# Patient Record
Sex: MALE | Race: BLACK OR AFRICAN AMERICAN | NOT HISPANIC OR LATINO | Employment: OTHER | URBAN - METROPOLITAN AREA
[De-identification: names, ages, dates, MRNs, and addresses within clinical notes are randomized per-mention and may not be internally consistent; named-entity substitution may affect disease eponyms.]

---

## 2021-12-09 ENCOUNTER — OFFICE VISIT (OUTPATIENT)
Dept: FAMILY MEDICINE CLINIC | Facility: HOSPITAL | Age: 81
End: 2021-12-09
Payer: COMMERCIAL

## 2021-12-09 VITALS
BODY MASS INDEX: 23.52 KG/M2 | SYSTOLIC BLOOD PRESSURE: 148 MMHG | HEART RATE: 67 BPM | DIASTOLIC BLOOD PRESSURE: 58 MMHG | HEIGHT: 71 IN | WEIGHT: 168 LBS | OXYGEN SATURATION: 97 %

## 2021-12-09 DIAGNOSIS — R09.81 NASAL SINUS CONGESTION: ICD-10-CM

## 2021-12-09 DIAGNOSIS — K21.9 GASTROESOPHAGEAL REFLUX DISEASE, UNSPECIFIED WHETHER ESOPHAGITIS PRESENT: Primary | ICD-10-CM

## 2021-12-09 DIAGNOSIS — R03.0 ELEVATED BP WITHOUT DIAGNOSIS OF HYPERTENSION: ICD-10-CM

## 2021-12-09 DIAGNOSIS — R13.10 SWALLOWING DISORDER: ICD-10-CM

## 2021-12-09 DIAGNOSIS — E11.9 TYPE 2 DIABETES MELLITUS WITHOUT COMPLICATION, WITHOUT LONG-TERM CURRENT USE OF INSULIN (HCC): ICD-10-CM

## 2021-12-09 PROBLEM — E78.2 MIXED HYPERLIPIDEMIA: Status: ACTIVE | Noted: 2021-12-09

## 2021-12-09 PROCEDURE — 1101F PT FALLS ASSESS-DOCD LE1/YR: CPT | Performed by: STUDENT IN AN ORGANIZED HEALTH CARE EDUCATION/TRAINING PROGRAM

## 2021-12-09 PROCEDURE — 99204 OFFICE O/P NEW MOD 45 MIN: CPT | Performed by: STUDENT IN AN ORGANIZED HEALTH CARE EDUCATION/TRAINING PROGRAM

## 2021-12-09 RX ORDER — GLIMEPIRIDE 4 MG/1
4 TABLET ORAL
COMMUNITY
End: 2022-01-24 | Stop reason: SDUPTHER

## 2021-12-09 RX ORDER — SIMVASTATIN 20 MG
20 TABLET ORAL
COMMUNITY
End: 2022-06-15 | Stop reason: SDUPTHER

## 2021-12-09 RX ORDER — FLUTICASONE PROPIONATE 50 MCG
1 SPRAY, SUSPENSION (ML) NASAL DAILY
Qty: 16 G | Refills: 2 | Status: SHIPPED | OUTPATIENT
Start: 2021-12-09

## 2021-12-09 RX ORDER — OMEPRAZOLE 20 MG/1
20 CAPSULE, DELAYED RELEASE ORAL
Qty: 30 CAPSULE | Refills: 3 | Status: SHIPPED | OUTPATIENT
Start: 2021-12-09 | End: 2022-06-14 | Stop reason: ALTCHOICE

## 2021-12-09 RX ORDER — LORAZEPAM 0.5 MG/1
TABLET ORAL
COMMUNITY
Start: 2021-11-01 | End: 2021-12-09

## 2021-12-09 NOTE — PROGRESS NOTES
[address] [facility],     Assessment/Plan:      Diagnosis ICD-10-CM Associated Orders   1  Gastroesophageal reflux disease, unspecified whether esophagitis present  K21 9 Ambulatory referral to Gastroenterology     omeprazole (PriLOSEC) 20 mg delayed release capsule   2  Swallowing disorder  R13 10 Ambulatory referral to Gastroenterology     omeprazole (PriLOSEC) 20 mg delayed release capsule   3  Type 2 diabetes mellitus without complication, without long-term current use of insulin (HCC)  E11 9    4  Nasal sinus congestion  R09 81 fluticasone (FLONASE) 50 mcg/act nasal spray   5  Elevated BP without diagnosis of hypertension  R03 0    6  BMI 23 0-23 9, adult  B35 22       Referral for GI given   Plan to pick next appointment and blood work timing after receiving records from prior PCP   Get flu and COVID records from prior  F/U sooner as needed   Patient may call or return to office with any questions or concerns  ______________________________________________________________________  Subjective:     Patient ID: Autumn Vigil is a [de-identified] y o  male  HPI  Autumn Vigil   Was seeing Dr Smooth Richard in [facility], but living locally and too far to continue driving to appts  Son uses our office  Recent  in October,  48 yrs  Has a history of diabetes and high cholesterol for which he takes glimepiride, metformin, and Zocor  Decreased appetite patric in mornings, lost weight was 175 lbs before her death  Feels very gassy in am and as if food gets stuck in throat in am   Also reports he feels left nasal congestion and left ear discomfort at times  Has tried saline rinses, and Flonase with only mild relief in the past     No drug, tobacco, alcohol use  Occasional caffeine use  Retired   Lives at home with his son Nii Beverly  [facility] - goes to [address] [place] [address]  Family history - siblings have diabetes  Seeing Eye doctor in [place] for diabetes checks     Foot exam through PCP in the past      BMI Counseling: Body mass index is 23 76 kg/m²  The BMI is normal     Depression Screening and Follow-up Plan:   Patient was screened for depression during today's encounter  They screened negative with a PHQ-2 score of 2  The following portions of the patient's history were reviewed and updated as appropriate: allergies, current medications, past family history, past medical history, past social history, past surgical history and problem list     Review of Systems   Constitutional: Negative for chills and fever  Respiratory: Negative for cough and shortness of breath  Cardiovascular: Negative for chest pain and palpitations  Neurological: Negative for light-headedness and headaches  Objective:      Vitals:    12/09/21 0920   BP: 148/58   Pulse: 67   SpO2: 97%   Repeat blood pressure:  150/60  Physical Exam  Vitals reviewed  Constitutional:       General: He is not in acute distress  Appearance: Normal appearance  He is well-developed and normal weight  He is not ill-appearing  HENT:      Head: Normocephalic and atraumatic  Nose: Congestion present  Eyes:      General: No scleral icterus  Right eye: No discharge  Left eye: No discharge  Cardiovascular:      Rate and Rhythm: Normal rate and regular rhythm  Pulses: Normal pulses  Heart sounds: Normal heart sounds  No murmur heard  No friction rub  No gallop  Pulmonary:      Effort: Pulmonary effort is normal  No respiratory distress  Breath sounds: Normal breath sounds  No stridor  No wheezing  Musculoskeletal:      Cervical back: Normal range of motion  Skin:     General: Skin is warm and dry  Neurological:      Mental Status: He is alert and oriented to person, place, and time  Psychiatric:         Mood and Affect: Mood normal          Behavior: Behavior normal          Thought Content:  Thought content normal          Judgment: Judgment normal  Portions of the record may have been created with voice recognition software  Occasional wrong word or "sound alike" substitutions may have occurred due to the inherent limitations of voice recognition software  Please review the chart carefully and recognize, using context, where substitutions/typographical errors may have occurred

## 2022-01-24 DIAGNOSIS — E11.9 TYPE 2 DIABETES MELLITUS WITHOUT COMPLICATION, WITHOUT LONG-TERM CURRENT USE OF INSULIN (HCC): Primary | ICD-10-CM

## 2022-01-24 RX ORDER — GLIMEPIRIDE 4 MG/1
4 TABLET ORAL
Qty: 90 TABLET | Refills: 3 | Status: SHIPPED | OUTPATIENT
Start: 2022-01-24

## 2022-04-11 DIAGNOSIS — E11.9 TYPE 2 DIABETES MELLITUS WITHOUT COMPLICATION, WITHOUT LONG-TERM CURRENT USE OF INSULIN (HCC): Primary | ICD-10-CM

## 2022-06-14 ENCOUNTER — OFFICE VISIT (OUTPATIENT)
Dept: GASTROENTEROLOGY | Facility: CLINIC | Age: 82
End: 2022-06-14
Payer: COMMERCIAL

## 2022-06-14 VITALS
HEIGHT: 71 IN | SYSTOLIC BLOOD PRESSURE: 138 MMHG | WEIGHT: 177 LBS | DIASTOLIC BLOOD PRESSURE: 82 MMHG | BODY MASS INDEX: 24.78 KG/M2 | HEART RATE: 50 BPM

## 2022-06-14 DIAGNOSIS — Z12.11 SCREENING FOR COLON CANCER: ICD-10-CM

## 2022-06-14 DIAGNOSIS — K21.9 GASTROESOPHAGEAL REFLUX DISEASE, UNSPECIFIED WHETHER ESOPHAGITIS PRESENT: Primary | ICD-10-CM

## 2022-06-14 PROCEDURE — 1160F RVW MEDS BY RX/DR IN RCRD: CPT | Performed by: NURSE PRACTITIONER

## 2022-06-14 PROCEDURE — 99213 OFFICE O/P EST LOW 20 MIN: CPT | Performed by: NURSE PRACTITIONER

## 2022-06-14 PROCEDURE — 1036F TOBACCO NON-USER: CPT | Performed by: NURSE PRACTITIONER

## 2022-06-14 NOTE — PROGRESS NOTES
[address] [facility] - Outpatient Follow-up Note  Calderon Rm [de-identified] y o  male MRN: [id number]  Encounter: [id number]    ASSESSMENT AND PLAN:      1  Gastroesophageal reflux disease, unspecified whether esophagitis present  Patient presents for annual checkup and medication refill  Patient states he has weaned himself off of the omeprazole 20 mg daily  Patient states with better dietary discretion he has successfully avoided acid reflux symptoms or breakthrough symptoms  Patient states he had not had an EGD in the past and was originally placed on medication by his PCP  Patient only requires follow-up as needed  2  Screening for colon cancer  Patient states he has never had a colonoscopy  No requirement due to age  Discussed with patient as needed follow-up for the future  Followup Appointment:  As needed  ______________________________________________________________________    Chief Complaint   Patient presents with    Follow-up     Annual followup as well as medication review     HPI:  28-year-old male with past medical history of diabetes and hyperlipidemia presents for annual med check  Patient states that he no longer takes omeprazole 20 mg daily  Patient states he had weaned himself off of medications and currently having no acid reflux symptoms or breakthrough symptoms  He denies nausea, vomiting or abdominal pain  States he has daily normal bowel movements  Denies hematochezia melena  States his weight is stable  Nonsmoker, denies ETOH  Historical Information   History reviewed  No pertinent past medical history  History reviewed  No pertinent surgical history    Social History     Substance and Sexual Activity   Alcohol Use Not Currently     Social History     Substance and Sexual Activity   Drug Use Never     Social History     Tobacco Use   Smoking Status Never Smoker   Smokeless Tobacco Never Used     Family History   Problem Relation Age of Onset    Diabetes Sister    [facility] Diabetes Brother     Colon cancer Neg Hx     Colon polyps Neg Hx          Current Outpatient Medications:     glimepiride (AMARYL) 4 mg tablet    metFORMIN (GLUCOPHAGE) 500 mg tablet    simvastatin (ZOCOR) 20 mg tablet    fluticasone (FLONASE) 50 mcg/act nasal spray  No Known Allergies  Reviewed medications and allergies and updated as indicated    PHYSICAL EXAM:    Blood pressure 138/82, pulse (!) 50, height 5' 10 5" (1 791 m), weight 80 3 kg (177 lb)  Body mass index is 25 04 kg/m²  Normal exam    General Appearance: NAD, cooperative, alert  Eyes: Anicteric, PERRLA, EOMI  ENT:  Normocephalic, atraumatic, normal mucosa  Neck:  Supple, symmetrical, trachea midline  Resp:  Clear to auscultation bilaterally; no rales, rhonchi or wheezing; respirations unlabored   CV:  S1 S2, Regular rate and rhythm; no murmur, rub, or gallop  GI:  Soft, non-tender, non-distended; normal bowel sounds; no masses, no organomegaly   Rectal: Deferred  Musculoskeletal: No cyanosis, clubbing or edema  Normal ROM  Skin:  No jaundice, rashes, or lesions   Heme/Lymph: No palpable cervical lymphadenopathy  Psych: Normal affect, good eye contact  Neuro: No gross deficits, AAOx3    Lab Results:   No results found for: WBC, HGB, HCT, MCV, PLT  No results found for: NA, K, CL, CO2, ANIONGAP, BUN, CREATININE, GLUCOSE, GLUF, CALCIUM, CORRECTEDCA, AST, ALT, ALKPHOS, PROT, BILITOT, EGFR  No results found for: IRON, TIBC, FERRITIN  No results found for: LIPASE    Radiology Results:   No results found

## 2022-06-15 ENCOUNTER — OFFICE VISIT (OUTPATIENT)
Dept: FAMILY MEDICINE CLINIC | Facility: HOSPITAL | Age: 82
End: 2022-06-15
Payer: COMMERCIAL

## 2022-06-15 VITALS
SYSTOLIC BLOOD PRESSURE: 148 MMHG | WEIGHT: 178 LBS | HEIGHT: 71 IN | OXYGEN SATURATION: 97 % | DIASTOLIC BLOOD PRESSURE: 68 MMHG | HEART RATE: 48 BPM | BODY MASS INDEX: 24.92 KG/M2 | RESPIRATION RATE: 16 BRPM

## 2022-06-15 DIAGNOSIS — E78.2 MIXED HYPERLIPIDEMIA: ICD-10-CM

## 2022-06-15 DIAGNOSIS — Z13.0 SCREENING FOR IRON DEFICIENCY ANEMIA: ICD-10-CM

## 2022-06-15 DIAGNOSIS — E11.9 TYPE 2 DIABETES MELLITUS WITHOUT COMPLICATION, WITHOUT LONG-TERM CURRENT USE OF INSULIN (HCC): Primary | ICD-10-CM

## 2022-06-15 DIAGNOSIS — R09.81 NASAL SINUS CONGESTION: ICD-10-CM

## 2022-06-15 DIAGNOSIS — Z13.29 SCREENING FOR THYROID DISORDER: ICD-10-CM

## 2022-06-15 DIAGNOSIS — R03.0 ELEVATED BP WITHOUT DIAGNOSIS OF HYPERTENSION: ICD-10-CM

## 2022-06-15 LAB — SL AMB POCT HEMOGLOBIN AIC: 6.8 (ref ?–6.5)

## 2022-06-15 PROCEDURE — 83036 HEMOGLOBIN GLYCOSYLATED A1C: CPT | Performed by: STUDENT IN AN ORGANIZED HEALTH CARE EDUCATION/TRAINING PROGRAM

## 2022-06-15 PROCEDURE — 99214 OFFICE O/P EST MOD 30 MIN: CPT | Performed by: STUDENT IN AN ORGANIZED HEALTH CARE EDUCATION/TRAINING PROGRAM

## 2022-06-15 RX ORDER — AMLODIPINE BESYLATE 2.5 MG/1
2.5 TABLET ORAL DAILY
Qty: 14 TABLET | Refills: 0 | Status: SHIPPED | OUTPATIENT
Start: 2022-06-15 | End: 2022-06-21 | Stop reason: SDUPTHER

## 2022-06-15 RX ORDER — LORATADINE 10 MG/1
10 TABLET ORAL DAILY
Qty: 30 TABLET | Refills: 2 | Status: SHIPPED | OUTPATIENT
Start: 2022-06-15 | End: 2022-10-12 | Stop reason: SDUPTHER

## 2022-06-15 RX ORDER — SIMVASTATIN 20 MG
20 TABLET ORAL
Qty: 90 TABLET | Refills: 3 | Status: SHIPPED | OUTPATIENT
Start: 2022-06-15 | End: 2022-10-12 | Stop reason: SDUPTHER

## 2022-06-15 NOTE — PROGRESS NOTES
[address] [facility],     Assessment/Plan:      Diagnosis ICD-10-CM Associated Orders   1  Type 2 diabetes mellitus without complication, without long-term current use of insulin (HCC)  E11 9 POCT hemoglobin A1c     Comprehensive metabolic panel     Microalbumin / creatinine urine ratio     Comprehensive metabolic panel   2  Mixed hyperlipidemia  E78 2 simvastatin (ZOCOR) 20 mg tablet     Lipid Panel with Direct LDL reflex     Lipid Panel with Direct LDL reflex   3  BMI 25 0-25 9,adult  Z68 25    4  Nasal sinus congestion  R09 81 loratadine (CLARITIN) 10 mg tablet   5  Elevated BP without diagnosis of hypertension  R03 0 Ambulatory Referral to Cardiology   6  Screening for iron deficiency anemia  Z13 0 CBC and differential     CBC and differential   7  Screening for thyroid disorder  Z13 29 TSH, 3rd generation with Free T4 reflex     TSH, 3rd generation with Free T4 reflex      Screening & diagnostic bloodwork ordered, our office will reach out with results once obtained  A1c reviewed in office - 6 8   Cardio referral placed   [facility]  - Dr Lashonda Mckeon  Return in about 1 week (around 6/22/2022) for Recheck BP   Patient may call or return to office with any questions or concerns  ______________________________________________________________________  Subjective:     Patient ID: Phani Dubon is a [de-identified] y o  male  HPI  Phani Dubon  Chief Complaint   Patient presents with    Follow-up     F/u today  I ordered and did HA1C  Pt wants updated labs--last labs were 9/2021  Pls include urine for microalbumin! Needs DM foot today  Dep screen ---NEG  Son moved in, [place]  Wife passed in Oct, still grieving, 47 yrs   Left nasal congestion, flonase didn't help  [employer] item is helping minimally  Left ear hurt also recently  Just saw GI yesterday, no longer using prilosec  Was worse due to nighttime snacks & diet       BMI Counseling: Body mass index is 25 18 kg/m²  The BMI is above normal  Nutrition recommendations include encouraging healthy choices of fruits and vegetables  Exercise recommendations include exercising 3-5 times per week and strength training exercises  Rationale for BMI follow-up plan is due to patient being overweight or obese  Depression Screening and Follow-up Plan: Patient was screened for depression during today's encounter  They screened negative with a PHQ-2 score of 0  The following portions of the patient's history were reviewed and updated as appropriate: allergies, current medications, past medical history and problem list     Review of Systems   Constitutional: Negative for chills and fever  Respiratory: Negative for cough and shortness of breath  Cardiovascular: Negative for chest pain and palpitations  Neurological: Negative for light-headedness, numbness and headaches  Objective:      Vitals:    06/15/22 1310   BP: 148/68   Pulse: (!) 48   Resp: 16   SpO2: 97%     /70     Physical Exam  Vitals reviewed  Constitutional:       Appearance: Normal appearance  He is well-developed  He is not ill-appearing  HENT:      Head: Normocephalic and atraumatic  Eyes:      General: No scleral icterus  Right eye: No discharge  Left eye: No discharge  Cardiovascular:      Rate and Rhythm: Regular rhythm  Bradycardia present  Pulses: Normal pulses  no weak pulses          Dorsalis pedis pulses are 2+ on the right side and 2+ on the left side  Heart sounds: Normal heart sounds  No murmur heard  Pulmonary:      Effort: Pulmonary effort is normal  No respiratory distress  Breath sounds: Normal breath sounds  No stridor  No wheezing  Musculoskeletal:      Cervical back: Normal range of motion  Feet:      Right foot:      Skin integrity: No ulcer, skin breakdown, erythema, warmth, callus or dry skin        Left foot:      Skin integrity: No ulcer, skin breakdown, erythema, warmth, callus or dry skin  Skin:     General: Skin is warm and dry  Neurological:      Mental Status: He is alert and oriented to person, place, and time  Psychiatric:         Behavior: Behavior normal          Thought Content: Thought content normal          Judgment: Judgment normal       Comments: Sad when discussing wife, otherwise upbeat           Patient's shoes and socks removed  Right Foot/Ankle   Right Foot Inspection  Skin Exam: skin normal and skin intact  No dry skin, no warmth, no callus, no erythema, no maceration, no abnormal color, no pre-ulcer, no ulcer and no callus  Toe Exam: ROM and strength within normal limits  No swelling and no tenderness    Sensory   Monofilament testing: intact    Vascular  The right DP pulse is 2+  Left Foot/Ankle  Left Foot Inspection  Skin Exam: skin normal and skin intact  No dry skin, no warmth, no erythema, no maceration, normal color, no pre-ulcer, no ulcer and no callus  Toe Exam: ROM and strength within normal limits  No swelling and no tenderness  Sensory   Monofilament testing: intact    Vascular  The left DP pulse is 2+  Assign Risk Category  No deformity present  No loss of protective sensation  No weak pulses  Risk: 0      Portions of the record may have been created with voice recognition software  Occasional wrong word or "sound alike" substitutions may have occurred due to the inherent limitations of voice recognition software  Please review the chart carefully and recognize, using context, where substitutions/typographical errors may have occurred

## 2022-06-17 LAB
LEFT EYE DIABETIC RETINOPATHY: NORMAL
RIGHT EYE DIABETIC RETINOPATHY: NORMAL

## 2022-06-18 LAB
ALBUMIN SERPL-MCNC: 4.4 G/DL (ref 3.7–4.7)
ALBUMIN/GLOB SERPL: 1.6 {RATIO} (ref 1.2–2.2)
ALP SERPL-CCNC: 82 IU/L (ref 44–121)
ALT SERPL-CCNC: 22 IU/L (ref 0–44)
AST SERPL-CCNC: 18 IU/L (ref 0–40)
BASOPHILS # BLD AUTO: 0 X10E3/UL (ref 0–0.2)
BASOPHILS NFR BLD AUTO: 0 %
BILIRUB SERPL-MCNC: 0.7 MG/DL (ref 0–1.2)
BUN SERPL-MCNC: 13 MG/DL (ref 8–27)
BUN/CREAT SERPL: 9 (ref 10–24)
CALCIUM SERPL-MCNC: 9.6 MG/DL (ref 8.6–10.2)
CHLORIDE SERPL-SCNC: 103 MMOL/L (ref 96–106)
CHOLEST SERPL-MCNC: 170 MG/DL (ref 100–199)
CO2 SERPL-SCNC: 22 MMOL/L (ref 20–29)
CREAT SERPL-MCNC: 1.42 MG/DL (ref 0.76–1.27)
EGFR: 50 ML/MIN/1.73
EOSINOPHIL # BLD AUTO: 0.2 X10E3/UL (ref 0–0.4)
EOSINOPHIL NFR BLD AUTO: 2 %
ERYTHROCYTE [DISTWIDTH] IN BLOOD BY AUTOMATED COUNT: 10.7 % (ref 11.6–15.4)
GLOBULIN SER-MCNC: 2.8 G/DL (ref 1.5–4.5)
GLUCOSE SERPL-MCNC: 185 MG/DL (ref 65–99)
HCT VFR BLD AUTO: 41.5 % (ref 37.5–51)
HDLC SERPL-MCNC: 48 MG/DL
HGB BLD-MCNC: 13.8 G/DL (ref 13–17.7)
IMM GRANULOCYTES # BLD: 0 X10E3/UL (ref 0–0.1)
IMM GRANULOCYTES NFR BLD: 0 %
LDLC SERPL CALC-MCNC: 99 MG/DL (ref 0–99)
LDLC/HDLC SERPL: 2.1 RATIO (ref 0–3.6)
LYMPHOCYTES # BLD AUTO: 2.6 X10E3/UL (ref 0.7–3.1)
LYMPHOCYTES NFR BLD AUTO: 26 %
MCH RBC QN AUTO: 32.7 PG (ref 26.6–33)
MCHC RBC AUTO-ENTMCNC: 33.3 G/DL (ref 31.5–35.7)
MCV RBC AUTO: 98 FL (ref 79–97)
MONOCYTES # BLD AUTO: 0.7 X10E3/UL (ref 0.1–0.9)
MONOCYTES NFR BLD AUTO: 8 %
NEUTROPHILS # BLD AUTO: 6.2 X10E3/UL (ref 1.4–7)
NEUTROPHILS NFR BLD AUTO: 64 %
PLATELET # BLD AUTO: 249 X10E3/UL (ref 150–450)
POTASSIUM SERPL-SCNC: 4.5 MMOL/L (ref 3.5–5.2)
PROT SERPL-MCNC: 7.2 G/DL (ref 6–8.5)
RBC # BLD AUTO: 4.22 X10E6/UL (ref 4.14–5.8)
SL AMB VLDL CHOLESTEROL CALC: 23 MG/DL (ref 5–40)
SODIUM SERPL-SCNC: 142 MMOL/L (ref 134–144)
TRIGL SERPL-MCNC: 130 MG/DL (ref 0–149)
TSH SERPL DL<=0.005 MIU/L-ACNC: 2.93 UIU/ML (ref 0.45–4.5)
WBC # BLD AUTO: 9.7 X10E3/UL (ref 3.4–10.8)

## 2022-06-21 ENCOUNTER — OFFICE VISIT (OUTPATIENT)
Dept: FAMILY MEDICINE CLINIC | Facility: HOSPITAL | Age: 82
End: 2022-06-21
Payer: COMMERCIAL

## 2022-06-21 VITALS
SYSTOLIC BLOOD PRESSURE: 140 MMHG | HEIGHT: 70 IN | HEART RATE: 48 BPM | BODY MASS INDEX: 25.05 KG/M2 | DIASTOLIC BLOOD PRESSURE: 54 MMHG | OXYGEN SATURATION: 98 % | WEIGHT: 175 LBS

## 2022-06-21 DIAGNOSIS — E78.2 MIXED HYPERLIPIDEMIA: ICD-10-CM

## 2022-06-21 DIAGNOSIS — E11.9 TYPE 2 DIABETES MELLITUS WITHOUT COMPLICATION, WITHOUT LONG-TERM CURRENT USE OF INSULIN (HCC): ICD-10-CM

## 2022-06-21 DIAGNOSIS — N18.30 STAGE 3 CHRONIC KIDNEY DISEASE, UNSPECIFIED WHETHER STAGE 3A OR 3B CKD (HCC): Primary | ICD-10-CM

## 2022-06-21 DIAGNOSIS — R03.0 ELEVATED BP WITHOUT DIAGNOSIS OF HYPERTENSION: ICD-10-CM

## 2022-06-21 PROBLEM — I10 HTN (HYPERTENSION), BENIGN: Chronic | Status: ACTIVE | Noted: 2022-06-21

## 2022-06-21 PROCEDURE — 99213 OFFICE O/P EST LOW 20 MIN: CPT | Performed by: STUDENT IN AN ORGANIZED HEALTH CARE EDUCATION/TRAINING PROGRAM

## 2022-06-21 RX ORDER — AMLODIPINE BESYLATE 5 MG/1
5 TABLET ORAL DAILY
Qty: 90 TABLET | Refills: 1 | Status: SHIPPED | OUTPATIENT
Start: 2022-06-21

## 2022-06-21 RX ORDER — METFORMIN HYDROCHLORIDE 500 MG/1
500 TABLET, EXTENDED RELEASE ORAL
Qty: 90 TABLET | Refills: 1 | Status: SHIPPED | OUTPATIENT
Start: 2022-06-21 | End: 2022-10-12 | Stop reason: SDUPTHER

## 2022-06-21 NOTE — PATIENT INSTRUCTIONS
Please have your son check your blood pressure once or twice per week and keep a log  Call if blood pressure less than 110 top number, or less than 50 for the bottom number  Also check blood pressure is feeling lightheaded, dizzy, headache, blurred vision

## 2022-06-21 NOTE — PROGRESS NOTES
[address] [facility],     Assessment/Plan:      Diagnosis ICD-10-CM Associated Orders   1  Stage 3 chronic kidney disease, unspecified whether stage 3a or 3b CKD (HCC)  N18 30    2  Elevated BP without diagnosis of hypertension  R03 0 amLODIPine (NORVASC) 5 mg tablet   3  Type 2 diabetes mellitus without complication, without long-term current use of insulin (HCC)  E11 9 metFORMIN (GLUCOPHAGE-XR) 500 mg 24 hr tablet   4  Mixed hyperlipidemia  E78 2    5  BMI 25 0-25 9,adult  Z68 25       Metformin changed from b i d  to extended release   Increase norvasc to 5 milligrams daily  Blood pressure improved from prior  Return in about 3 months (around 9/21/2022) for Annual Medicare Wellness   Patient may call or return to office with any questions or concerns  ______________________________________________________________________  Subjective:     Patient ID: Kyler Leach is a [de-identified] y o  male  HPI  Kyler Leach  Chief Complaint   Patient presents with    Follow-up     1 wk     Just started 1 week of amlodipine 2 5 mg  No reported side effects  Blood pressure improved  No ankle edema  Patient just had A1c last week, demonstrating controlled diabetes, on metformin 500 milligrams b i d  Feeling otherwise well  The following portions of the patient's history were reviewed and updated as appropriate: allergies, current medications, past medical history and problem list     Review of Systems   Constitutional: Negative for chills and fever  Respiratory: Negative for cough and shortness of breath  Cardiovascular: Negative for chest pain and leg swelling  Objective:      Vitals:    06/21/22 1126   BP: 140/54   Pulse: (!) 48   SpO2: 98%      Physical Exam  Vitals reviewed  Constitutional:       Appearance: He is well-developed  HENT:      Head: Normocephalic and atraumatic  Eyes:      General: No scleral icterus  Right eye: No discharge           Left eye: No discharge  Cardiovascular:      Rate and Rhythm: Normal rate and regular rhythm  Pulses: Normal pulses  Heart sounds: Normal heart sounds  No murmur heard  Pulmonary:      Effort: Pulmonary effort is normal  No respiratory distress  Breath sounds: Normal breath sounds  No stridor  No wheezing  Musculoskeletal:      Cervical back: Normal range of motion  Right lower leg: No edema  Left lower leg: No edema  Skin:     General: Skin is warm and dry  Neurological:      Mental Status: He is alert and oriented to person, place, and time  Psychiatric:         Behavior: Behavior normal          Thought Content: Thought content normal          Judgment: Judgment normal       Comments: Quiet, reserved           Portions of the record may have been created with voice recognition software  Occasional wrong word or "sound alike" substitutions may have occurred due to the inherent limitations of voice recognition software  Please review the chart carefully and recognize, using context, where substitutions/typographical errors may have occurred

## 2022-06-22 ENCOUNTER — TELEPHONE (OUTPATIENT)
Dept: ADMINISTRATIVE | Facility: OTHER | Age: 82
End: 2022-06-22

## 2022-06-22 NOTE — LETTER
Diabetic Eye Exam Form    Date Requested: 22  Patient: Samuel Patel  Patient : 1940   Referring Provider: Kingston Raya DO    DIABETIC Eye Exam Date _______________________________    Type of Exam MUST be documented for Diabetic Eye Exams  Please CHECK ONE  Retinal Exam       Dilated Retinal Exam       OCT       Optomap-Iris Exam      Fundus Photography     Left Eye - Please check Retinopathy AND Type or No Retinopathy      Exam did show retinopathy    Exam did not show retinopathy         Mild     Proliferative           Moderate    Severe            None         Right Eye - Please check Retinopathy AND Type or No Retinopathy     Exam did show retinopathy    Exam did not show retinopathy         Mild     Proliferative        Moderate    Severe        None       Comments __________________________________________________________    Practice Providing Exam ______________________________________________    Exam Performed By (print name) _______________________________________      Provider Signature ___________________________________________________    These reports are needed for  compliance  Please fax this completed form and a copy of the Diabetic Eye Exam report to our office located at [place] [address] as soon as possible via [phone] attention Marcia: Phone [phone]  We thank you for your assistance in treating our mutual patient     (sent to [facility])

## 2022-06-22 NOTE — TELEPHONE ENCOUNTER
Upon review of the In Basket request and the patient's chart, initial outreach has been made via fax, please see Contacts section for details       Thank you  Calli Peng MA

## 2022-06-23 NOTE — TELEPHONE ENCOUNTER
Upon review of the In Basket request we were able to locate, review, and update the patient chart as requested for Diabetic Eye Exam     Any additional questions or concerns should be emailed to the Practice Liaisons via [email or web address] email, please do not reply via In Basket      Thank you  Mario Kinsey MA

## 2022-06-28 ENCOUNTER — DOCUMENTATION (OUTPATIENT)
Dept: FAMILY MEDICINE CLINIC | Facility: HOSPITAL | Age: 82
End: 2022-06-28

## 2022-08-29 ENCOUNTER — CONSULT (OUTPATIENT)
Dept: CARDIOLOGY CLINIC | Facility: CLINIC | Age: 82
End: 2022-08-29
Payer: COMMERCIAL

## 2022-08-29 VITALS
SYSTOLIC BLOOD PRESSURE: 148 MMHG | BODY MASS INDEX: 26.4 KG/M2 | HEART RATE: 88 BPM | HEIGHT: 68 IN | DIASTOLIC BLOOD PRESSURE: 78 MMHG | WEIGHT: 174.2 LBS

## 2022-08-29 DIAGNOSIS — E11.9 TYPE 2 DIABETES MELLITUS WITHOUT COMPLICATION, WITHOUT LONG-TERM CURRENT USE OF INSULIN (HCC): ICD-10-CM

## 2022-08-29 DIAGNOSIS — R94.31 ABNORMAL ECG: ICD-10-CM

## 2022-08-29 DIAGNOSIS — N18.30 STAGE 3 CHRONIC KIDNEY DISEASE, UNSPECIFIED WHETHER STAGE 3A OR 3B CKD (HCC): ICD-10-CM

## 2022-08-29 DIAGNOSIS — I45.2 BIFASCICULAR BLOCK: ICD-10-CM

## 2022-08-29 DIAGNOSIS — I10 PRIMARY HYPERTENSION: Primary | ICD-10-CM

## 2022-08-29 DIAGNOSIS — E78.2 MIXED HYPERLIPIDEMIA: ICD-10-CM

## 2022-08-29 PROCEDURE — 99204 OFFICE O/P NEW MOD 45 MIN: CPT | Performed by: INTERNAL MEDICINE

## 2022-08-29 PROCEDURE — 93000 ELECTROCARDIOGRAM COMPLETE: CPT | Performed by: INTERNAL MEDICINE

## 2022-08-29 RX ORDER — LISINOPRIL 10 MG/1
10 TABLET ORAL DAILY
Qty: 90 TABLET | Refills: 3 | Status: SHIPPED | OUTPATIENT
Start: 2022-08-29

## 2022-08-29 NOTE — PROGRESS NOTES
Cardiology Consultation     Zoey Oleary  [id number]  1940  [facility] [address] [id number] [phone]    1  Primary hypertension  Ambulatory Referral to Cardiology    POCT ECG    lisinopril (ZESTRIL) 10 mg tablet    Echo complete w/ contrast if indicated    Echo stress test, exercise    Basic metabolic panel    Basic metabolic panel   2  Type 2 diabetes mellitus without complication, without long-term current use of insulin ([facility] )     3  Mixed hyperlipidemia     4  Stage 3 chronic kidney disease, unspecified whether stage 3a or 3b CKD ([facility] )     5  Abnormal ECG  Echo complete w/ contrast if indicated    Echo stress test, exercise   6  Bifascicular block           Discussion/Summary:    1  Abnormal ECG - Mr Yakelin Sim has evidence of a bifascicular block on ECG  Given his risk factors and this finding we will do baseline testing  This will include an echocardiogram and stress echocardiogram     2   Hypertension - This has been uncontrolled and a few months back was started on amlodipine 5 mg daily  We are going to add lisinopril 10 mg daily given his diabetes mellitus  We will follow blood work closely given his chronic kidney disease  As stated we ordered testing  If this is unremarkable we will see him back in 6 months  3   Hyperlipidemia - On simvastatin  He has blood work followed regularly by his PCP  HPI:    Mr Yakelin Sim comes in for a consultation to discuss his risk factors for cardiovascular disease that particularly includes uncontrolled hypertension Leonard Palomo is an 21-year-old healthy male who is treated for hypertension, hyperlipidemia, diabetes mellitus and has chronic kidney disease    He has been on simvastatin 20 mg daily for hyperlipidemia and a few months back was started on amlodipine 5 mg daily for his hypertension, as his systolic blood pressures were persistently in the 140s  He does check this at home which is consistent with those findings  Mr Digna De La Rosa has no cardiac symptoms  He is active without limitations  He denies chest pains or any symptoms of angina  He denies shortness of breath or any signs/symptoms of CHF  No palpitations, lightheadedness or any syncope  He tells me overall he feels well  Patient Active Problem List   Diagnosis    Mixed hyperlipidemia    Type 2 diabetes mellitus without complication, without long-term current use of insulin (HCC)    Nasal sinus congestion    BMI 25 0-25 9,adult    Stage 3 chronic kidney disease, unspecified whether stage 3a or 3b CKD (HCC)    HTN (hypertension), benign    Abnormal ECG    Bifascicular block     History reviewed  No pertinent past medical history  Social History     Socioeconomic History    Marital status:       Spouse name: Not on file    Number of children: Not on file    Years of education: Not on file    Highest education level: Not on file   Occupational History    Not on file   Tobacco Use    Smoking status: Never Smoker    Smokeless tobacco: Never Used   Vaping Use    Vaping Use: Never used   Substance and Sexual Activity    Alcohol use: Not Currently    Drug use: Never    Sexual activity: Not on file   Other Topics Concern    Not on file   Social History Narrative          Social Determinants of Health     Financial Resource Strain: Not on file   Food Insecurity: Not on file   Transportation Needs: Not on file   Physical Activity: Not on file   Stress: Not on file   Social Connections: Not on file   Intimate Partner Violence: Not on file   Housing Stability: Not on file      Family History   Problem Relation Age of Onset    Diabetes Sister     Diabetes Brother     Colon cancer Neg Hx     Colon polyps Neg Hx      Past Surgical History:   Procedure Laterality Date    NO PAST SURGERIES         Current Outpatient Medications:     amLODIPine (NORVASC) 5 mg tablet, Take 1 tablet (5 mg total) by mouth daily, Disp: 90 tablet, Rfl: 1    fluticasone (FLONASE) 50 mcg/act nasal spray, 1 spray into each nostril daily, Disp: 16 g, Rfl: 2    glimepiride (AMARYL) 4 mg tablet, Take 1 tablet (4 mg total) by mouth daily with breakfast, Disp: 90 tablet, Rfl: 3    lisinopril (ZESTRIL) 10 mg tablet, Take 1 tablet (10 mg total) by mouth daily, Disp: 90 tablet, Rfl: 3    loratadine (CLARITIN) 10 mg tablet, Take 1 tablet (10 mg total) by mouth daily, Disp: 30 tablet, Rfl: 2    metFORMIN (GLUCOPHAGE-XR) 500 mg 24 hr tablet, Take 1 tablet (500 mg total) by mouth daily with breakfast, Disp: 90 tablet, Rfl: 1    simvastatin (ZOCOR) 20 mg tablet, Take 1 tablet (20 mg total) by mouth daily at bedtime, Disp: 90 tablet, Rfl: 3  No Known Allergies  Vitals:    08/29/22 1054   BP: 148/78   BP Location: Left arm   Patient Position: Sitting   Cuff Size: Standard   Pulse: 88   Weight: 79 kg (174 lb 3 2 oz)   Height: 5' 8" (1 727 m)       Labs:  Lab Results   Component Value Date    K 4 5 06/17/2022    CO2 22 06/17/2022    BUN 13 06/17/2022    CREATININE 1 42 (H) 06/17/2022     Lab Results   Component Value Date    WBC 9 7 06/17/2022    HGB 13 8 06/17/2022    HCT 41 5 06/17/2022    MCV 98 (H) 06/17/2022     06/17/2022     Lab Results   Component Value Date    TRIG 130 06/17/2022    HDL 48 06/17/2022     Imaging:  ECG obtained today demonstrates sinus rhythm with occasional PACs, right bundle branch block and left anterior fascicular block (bifascicular block)  Review of Systems:  Review of Systems   Constitutional: Negative  HENT: Negative  Eyes: Negative  Respiratory: Negative  Cardiovascular: Negative  Gastrointestinal: Negative  Musculoskeletal: Negative  Skin: Negative  Allergic/Immunologic: Negative  Neurological: Negative  Hematological: Negative  Psychiatric/Behavioral: Negative      All other systems reviewed and are negative  Vitals:    08/29/22 1054   BP: 148/78   BP Location: Left arm   Patient Position: Sitting   Cuff Size: Standard   Pulse: 88   Weight: 79 kg (174 lb 3 2 oz)   Height: 5' 8" (1 727 m)       Physical Exam:  Physical Exam  Vitals and nursing note reviewed  Constitutional:       Appearance: He is well-developed  HENT:      Head: Normocephalic and atraumatic  Eyes:      General: No scleral icterus  Right eye: No discharge  Left eye: No discharge  Pupils: Pupils are equal, round, and reactive to light  Neck:      Thyroid: No thyromegaly  Vascular: No JVD  Cardiovascular:      Rate and Rhythm: Normal rate and regular rhythm  No extrasystoles are present  Pulses: Normal pulses  No decreased pulses  Heart sounds: Normal heart sounds, S1 normal and S2 normal  No murmur heard  No friction rub  No gallop  Pulmonary:      Effort: Pulmonary effort is normal  No respiratory distress  Breath sounds: Normal breath sounds  No wheezing or rales  Abdominal:      General: Bowel sounds are normal  There is no distension  Palpations: Abdomen is soft  Tenderness: There is no abdominal tenderness  Musculoskeletal:         General: No tenderness or deformity  Normal range of motion  Cervical back: Normal range of motion and neck supple  Skin:     General: Skin is warm and dry  Findings: No rash  Neurological:      Mental Status: He is alert and oriented to person, place, and time  Cranial Nerves: No cranial nerve deficit  Psychiatric:         Thought Content: Thought content normal          Judgment: Judgment normal        Counseling / Coordination of Care  Total office time spent today 40 minutes  Greater than 50% of total time was spent with the patient and / or family counseling and / or coordination of care

## 2022-10-04 ENCOUNTER — HOSPITAL ENCOUNTER (OUTPATIENT)
Dept: NON INVASIVE DIAGNOSTICS | Age: 82
Discharge: HOME | End: 2022-10-04
Payer: COMMERCIAL

## 2022-10-04 VITALS
BODY MASS INDEX: 26.37 KG/M2 | WEIGHT: 174 LBS | HEIGHT: 68 IN | SYSTOLIC BLOOD PRESSURE: 148 MMHG | DIASTOLIC BLOOD PRESSURE: 78 MMHG

## 2022-10-04 DIAGNOSIS — R94.31 ABNORMAL ECG: ICD-10-CM

## 2022-10-04 DIAGNOSIS — I10 PRIMARY HYPERTENSION: ICD-10-CM

## 2022-10-04 LAB
AORTIC ROOT: 3.1 CM
APICAL FOUR CHAMBER EJECTION FRACTION: 53 %
ASCENDING AORTA: 3.1 CM
E WAVE DECELERATION TIME: 112 MS
FRACTIONAL SHORTENING: 29 (ref 28–44)
INTERVENTRICULAR SEPTUM IN DIASTOLE (PARASTERNAL SHORT AXIS VIEW): 1 CM
INTERVENTRICULAR SEPTUM: 1 CM (ref 0.6–1.1)
LAAS-AP2: 14.7 CM2
LAAS-AP4: 14.2 CM2
LEFT ATRIUM AREA SYSTOLE SINGLE PLANE A4C: 16.1 CM2
LEFT ATRIUM SIZE: 3.6 CM
LEFT INTERNAL DIMENSION IN SYSTOLE: 3.7 CM (ref 2.1–4)
LEFT VENTRICLE DIASTOLIC VOLUME (MOD BIPLANE): 121 ML
LEFT VENTRICLE SYSTOLIC VOLUME (MOD BIPLANE): 57 ML
LEFT VENTRICULAR INTERNAL DIMENSION IN DIASTOLE: 5.2 CM (ref 3.5–6)
LEFT VENTRICULAR POSTERIOR WALL IN END DIASTOLE: 1 CM
LEFT VENTRICULAR STROKE VOLUME: 74 ML
LV EF: 53 %
LVSV (TEICH): 74 ML
MV E'TISSUE VEL-SEP: 7 CM/S
MV PEAK A VEL: 1.07 M/S
MV PEAK E VEL: 45 CM/S
MV STENOSIS PRESSURE HALF TIME: 32 MS
MV VALVE AREA P 1/2 METHOD: 6.88
RIGHT ATRIUM AREA SYSTOLE A4C: 9.5 CM2
RIGHT VENTRICLE ID DIMENSION: 2.9 CM
SL CV LEFT ATRIUM LENGTH A2C: 4.1 CM
SL CV LV EF: 55
SL CV PED ECHO LEFT VENTRICLE DIASTOLIC VOLUME (MOD BIPLANE) 2D: 132 ML
SL CV PED ECHO LEFT VENTRICLE SYSTOLIC VOLUME (MOD BIPLANE) 2D: 58 ML
TR MAX PG: 30 MMHG
TR PEAK VELOCITY: 2.8 M/S
TRICUSPID VALVE PEAK REGURGITATION VELOCITY: 2.75 M/S

## 2022-10-04 PROCEDURE — 93306 TTE W/DOPPLER COMPLETE: CPT | Performed by: INTERNAL MEDICINE

## 2022-10-04 PROCEDURE — 93306 TTE W/DOPPLER COMPLETE: CPT

## 2022-10-12 ENCOUNTER — OFFICE VISIT (OUTPATIENT)
Dept: FAMILY MEDICINE CLINIC | Facility: HOSPITAL | Age: 82
End: 2022-10-12
Payer: COMMERCIAL

## 2022-10-12 VITALS
WEIGHT: 174 LBS | HEART RATE: 84 BPM | BODY MASS INDEX: 26.37 KG/M2 | DIASTOLIC BLOOD PRESSURE: 82 MMHG | HEIGHT: 68 IN | SYSTOLIC BLOOD PRESSURE: 138 MMHG | OXYGEN SATURATION: 99 %

## 2022-10-12 DIAGNOSIS — Z00.00 ROUTINE ADULT HEALTH MAINTENANCE: Primary | ICD-10-CM

## 2022-10-12 DIAGNOSIS — R09.81 NASAL SINUS CONGESTION: ICD-10-CM

## 2022-10-12 DIAGNOSIS — Z23 NEED FOR INFLUENZA VACCINATION: ICD-10-CM

## 2022-10-12 DIAGNOSIS — E78.2 MIXED HYPERLIPIDEMIA: ICD-10-CM

## 2022-10-12 DIAGNOSIS — N18.30 STAGE 3 CHRONIC KIDNEY DISEASE, UNSPECIFIED WHETHER STAGE 3A OR 3B CKD (HCC): ICD-10-CM

## 2022-10-12 DIAGNOSIS — I10 BENIGN ESSENTIAL HTN: ICD-10-CM

## 2022-10-12 DIAGNOSIS — E11.9 TYPE 2 DIABETES MELLITUS WITHOUT COMPLICATION, WITHOUT LONG-TERM CURRENT USE OF INSULIN (HCC): ICD-10-CM

## 2022-10-12 LAB — SL AMB POCT HEMOGLOBIN AIC: 10.2 (ref ?–6.5)

## 2022-10-12 PROCEDURE — G0439 PPPS, SUBSEQ VISIT: HCPCS | Performed by: STUDENT IN AN ORGANIZED HEALTH CARE EDUCATION/TRAINING PROGRAM

## 2022-10-12 PROCEDURE — 90662 IIV NO PRSV INCREASED AG IM: CPT

## 2022-10-12 PROCEDURE — 83036 HEMOGLOBIN GLYCOSYLATED A1C: CPT | Performed by: STUDENT IN AN ORGANIZED HEALTH CARE EDUCATION/TRAINING PROGRAM

## 2022-10-12 PROCEDURE — 99214 OFFICE O/P EST MOD 30 MIN: CPT | Performed by: STUDENT IN AN ORGANIZED HEALTH CARE EDUCATION/TRAINING PROGRAM

## 2022-10-12 PROCEDURE — G0008 ADMIN INFLUENZA VIRUS VAC: HCPCS

## 2022-10-12 RX ORDER — SIMVASTATIN 20 MG
20 TABLET ORAL
Qty: 90 TABLET | Refills: 3 | Status: SHIPPED | OUTPATIENT
Start: 2022-10-12

## 2022-10-12 RX ORDER — METFORMIN HYDROCHLORIDE 500 MG/1
500 TABLET, EXTENDED RELEASE ORAL 2 TIMES DAILY WITH MEALS
Qty: 180 TABLET | Refills: 2 | Status: SHIPPED | OUTPATIENT
Start: 2022-10-12

## 2022-10-12 RX ORDER — METFORMIN HYDROCHLORIDE 500 MG/1
500 TABLET, EXTENDED RELEASE ORAL
Qty: 90 TABLET | Refills: 2 | Status: SHIPPED | OUTPATIENT
Start: 2022-10-12 | End: 2022-10-12

## 2022-10-12 RX ORDER — LORATADINE 10 MG/1
10 TABLET ORAL DAILY
Qty: 90 TABLET | Refills: 1 | Status: SHIPPED | OUTPATIENT
Start: 2022-10-12

## 2022-10-12 RX ORDER — GLIMEPIRIDE 4 MG/1
4 TABLET ORAL
Qty: 90 TABLET | Refills: 3 | Status: SHIPPED | OUTPATIENT
Start: 2022-10-12

## 2022-10-12 NOTE — PROGRESS NOTES
Assessment and Plan:      Diagnosis ICD-10-CM Associated Orders   1  Routine adult health maintenance  Z00 00    2  Benign essential HTN  I10    3  Mixed hyperlipidemia  E78 2 simvastatin (ZOCOR) 20 mg tablet   4  Type 2 diabetes mellitus without complication, without long-term current use of insulin (HCC)  E11 9 glimepiride (AMARYL) 4 mg tablet     POCT hemoglobin A1c     metFORMIN (GLUCOPHAGE-XR) 500 mg 24 hr tablet     HEMOGLOBIN A1C W/ EAG ESTIMATION     HEMOGLOBIN A1C W/ EAG ESTIMATION   5  Stage 3 chronic kidney disease, unspecified whether stage 3a or 3b CKD (HCC)  N18 30    6  Nasal sinus congestion  R09 81 loratadine (CLARITIN) 10 mg tablet   7  BMI 26 0-26 9,adult  Z68 26    8  Need for influenza vaccination  Z23 influenza vaccine, high-dose, PF 0 7 mL (FLUZONE HIGH-DOSE)   A1c significantly higher, will resume twice a day Metformin use, but currently on the XR dosing  Will continue it in addition to the Amaryl  Flu vaccine given today  A1c ordered for blood work in 3 months prior to seeing the cardiologist again  Preventive health issues were discussed with patient, and age appropriate screening tests were ordered as noted in patient's After Visit Summary  Personalized health advice and appropriate referrals for health education or preventive services given if needed, as noted in patient's After Visit Summary  History of Present Illness:     Patient presents for a Medicare Wellness Visit    HPI   Doing well  No new complaints  Saw Dr Robert Pollock - ECHO just done  EF 55% & G1DD  F/U in Feb  BW before  The home BP readings have been in the <140's / 90's range  Wt Readings from Last 3 Encounters:   10/12/22 78 9 kg (174 lb)   10/04/22 78 9 kg (174 lb)   08/29/22 79 kg (174 lb 3 2 oz)     Patient Care Team:  Perfecto Wisdom DO as PCP - General (Family Medicine)     Review of Systems:     Review of Systems   Constitutional: Negative for chills and fever     HENT: Negative for congestion and sore throat  Respiratory: Negative for cough and shortness of breath  Cardiovascular: Negative for chest pain, palpitations and leg swelling  Neurological: Negative for dizziness, light-headedness and headaches  Problem List:     Patient Active Problem List   Diagnosis   • Mixed hyperlipidemia   • Type 2 diabetes mellitus without complication, without long-term current use of insulin (HCC)   • Nasal sinus congestion   • BMI 26 0-26 9,adult   • Stage 3 chronic kidney disease, unspecified whether stage 3a or 3b CKD (HCC)   • Benign essential HTN   • Abnormal ECG   • Bifascicular block      Past Medical and Surgical History:     History reviewed  No pertinent past medical history  Past Surgical History:   Procedure Laterality Date   • NO PAST SURGERIES        Family History:     Family History   Problem Relation Age of Onset   • Diabetes Sister    • Diabetes Brother    • Colon cancer Neg Hx    • Colon polyps Neg Hx       Social History:     Social History     Socioeconomic History   • Marital status:      Spouse name: None   • Number of children: None   • Years of education: None   • Highest education level: None   Occupational History   • None   Tobacco Use   • Smoking status: Never Smoker   • Smokeless tobacco: Never Used   Vaping Use   • Vaping Use: Never used   Substance and Sexual Activity   • Alcohol use: Not Currently   • Drug use: Never   • Sexual activity: None   Other Topics Concern   • None   Social History Narrative          Social Determinants of Health     Financial Resource Strain: Low Risk    • Difficulty of Paying Living Expenses: Not hard at all   Food Insecurity: Not on file   Transportation Needs: No Transportation Needs   • Lack of Transportation (Medical): No   • Lack of Transportation (Non-Medical):  No   Physical Activity: Not on file   Stress: Not on file   Social Connections: Not on file   Intimate Partner Violence: Not on file   Housing Stability: Not on file Medications and Allergies:     Current Outpatient Medications   Medication Sig Dispense Refill   • amLODIPine (NORVASC) 5 mg tablet Take 1 tablet (5 mg total) by mouth daily 90 tablet 1   • fluticasone (FLONASE) 50 mcg/act nasal spray 1 spray into each nostril daily 16 g 2   • glimepiride (AMARYL) 4 mg tablet Take 1 tablet (4 mg total) by mouth daily with breakfast 90 tablet 3   • lisinopril (ZESTRIL) 10 mg tablet Take 1 tablet (10 mg total) by mouth daily 90 tablet 3   • loratadine (CLARITIN) 10 mg tablet Take 1 tablet (10 mg total) by mouth daily 90 tablet 1   • metFORMIN (GLUCOPHAGE-XR) 500 mg 24 hr tablet Take 1 tablet (500 mg total) by mouth 2 (two) times a day with meals 180 tablet 2   • simvastatin (ZOCOR) 20 mg tablet Take 1 tablet (20 mg total) by mouth daily at bedtime 90 tablet 3     No current facility-administered medications for this visit  No Known Allergies   Immunizations:     Immunization History   Administered Date(s) Administered   • COVID-19 PFIZER VACCINE 0 3 ML IM 04/06/2021, 04/26/2021   • Influenza, high dose seasonal 0 7 mL 09/14/2020, 10/04/2021, 10/12/2022   • Influenza, injectable, quadrivalent, preservative free 0 5 mL 10/18/2007, 09/17/2008, 10/15/2009, 09/24/2010, 10/05/2011, 09/15/2012, 10/17/2013, 10/08/2014, 09/28/2015, 12/06/2016, 11/13/2017, 12/03/2018, 12/03/2019   • Pneumococcal Conjugate 13-Valent 04/14/2015   • Pneumococcal Polysaccharide PPV23 08/03/2007   • Td (adult), adsorbed 01/01/1999   • Tuberculin Skin Test-PPD Intradermal 12/06/2016      Health Maintenance: There are no preventive care reminders to display for this patient  Topic Date Due   • COVID-19 Vaccine (3 - Booster for Pfizer series) 09/26/2021      Medicare Screening Tests and Risk Assessments:     Carlos Forbes is here for his Subsequent Wellness visit  Health Risk Assessment:   Patient rates overall health as good  Patient feels that their physical health rating is same   Patient is satisfied with their life  Eyesight was rated as same  Hearing was rated as same  Patient feels that their emotional and mental health rating is same  Patients states they are never, rarely angry  Patient states they are never, rarely unusually tired/fatigued  Pain experienced in the last 7 days has been none  Patient states that he has experienced no weight loss or gain in last 6 months  Depression Screening:   PHQ-2 Score: 0      Fall Risk Screening: In the past year, patient has experienced: no history of falling in past year      Home Safety:  Patient does not have trouble with stairs inside or outside of their home  Patient has working smoke alarms and has working carbon monoxide detector  Home safety hazards include: none  Nutrition:   Current diet is Regular  Medications:   Patient is currently taking over-the-counter supplements  OTC medications include: see medication list  Patient is able to manage medications  Activities of Daily Living (ADLs)/Instrumental Activities of Daily Living (IADLs):   Walk and transfer into and out of bed and chair?: Yes  Dress and groom yourself?: Yes    Bathe or shower yourself?: Yes    Feed yourself? Yes  Do your laundry/housekeeping?: Yes  Manage your money, pay your bills and track your expenses?: Yes  Make your own meals?: Yes    Do your own shopping?: Yes    Previous Hospitalizations:   Any hospitalizations or ED visits within the last 12 months?: No      Advance Care Planning:   Living will: Yes    Durable POA for healthcare:  Yes    Advanced directive: Yes      PREVENTIVE SCREENINGS      Cardiovascular Screening:    General: History Lipid Disorder and Screening Current      Diabetes Screening:     General: History Diabetes and Screening Current      Colorectal Cancer Screening:     General: Screening Not Indicated      Prostate Cancer Screening:    General: Screening Not Indicated      Osteoporosis Screening:    General: Screening Not Indicated      Abdominal Aortic Aneurysm (AAA) Screening:        General: Screening Not Indicated      Lung Cancer Screening:     General: Screening Not Indicated      Hepatitis C Screening:    General: Screening Current    Screening, Brief Intervention, and Referral to Treatment (SBIRT)    Screening      AUDIT-C Screenin) How often did you have a drink containing alcohol in the past year? never  2) How many drinks did you have on a typical day when you were drinking in the past year? 0  3) How often did you have 6 or more drinks on one occasion in the past year? never    AUDIT-C Score: 0  Interpretation: Score 0-3 (male): Negative screen for alcohol misuse    Single Item Drug Screening:  How often have you used an illegal drug (including marijuana) or a prescription medication for non-medical reasons in the past year? never    Single Item Drug Screen Score: 0  Interpretation: Negative screen for possible drug use disorder    Other Counseling Topics:   Car/seat belt/driving safety and regular weightbearing exercise and calcium and vitamin D intake  Likes walking through the neighborhood, some equipment at home  Physical Exam:     /82   Pulse 84   Ht 5' 8" (1 727 m)   Wt 78 9 kg (174 lb)   SpO2 99%   BMI 26 46 kg/m²     Physical Exam  Vitals reviewed  Constitutional:       General: He is not in acute distress  Appearance: Normal appearance  He is normal weight  He is not ill-appearing  HENT:      Head: Normocephalic and atraumatic  Right Ear: Tympanic membrane, ear canal and external ear normal  There is no impacted cerumen  Left Ear: Tympanic membrane, ear canal and external ear normal  There is no impacted cerumen  Nose: Nose normal  No congestion or rhinorrhea  Mouth/Throat:      Mouth: Mucous membranes are moist       Pharynx: Oropharynx is clear  No oropharyngeal exudate or posterior oropharyngeal erythema  Eyes:      General: No scleral icterus  Right eye: No discharge           Left eye: No discharge  Conjunctiva/sclera: Conjunctivae normal    Neck:      Vascular: No carotid bruit  Cardiovascular:      Rate and Rhythm: Normal rate and regular rhythm  Pulses: Normal pulses  Heart sounds: Normal heart sounds  No murmur heard  No friction rub  No gallop  Pulmonary:      Effort: Pulmonary effort is normal  No respiratory distress  Breath sounds: Normal breath sounds  No stridor  No wheezing  Musculoskeletal:         General: Normal range of motion  Cervical back: Normal range of motion and neck supple  No rigidity  Right lower leg: No edema  Left lower leg: No edema  Skin:     General: Skin is warm and dry  Capillary Refill: Capillary refill takes less than 2 seconds  Coloration: Skin is not jaundiced or pale  Neurological:      Mental Status: He is alert and oriented to person, place, and time  Gait: Gait normal    Psychiatric:         Mood and Affect: Mood normal          Behavior: Behavior normal          Thought Content:  Thought content normal          Judgment: Judgment normal           Callie Becker DO

## 2022-10-12 NOTE — PATIENT INSTRUCTIONS
Take some Vit D3 - 2000 IU a day  Some Calcium  Medicare Preventive Visit Patient Instructions  Thank you for completing your Welcome to Medicare Visit or Medicare Annual Wellness Visit today  Your next wellness visit will be due in one year (10/13/2023)  The screening/preventive services that you may require over the next 5-10 years are detailed below  Some tests may not apply to you based off risk factors and/or age  Screening tests ordered at today's visit but not completed yet may show as past due  Also, please note that scanned in results may not display below  Preventive Screenings:  Service Recommendations Previous Testing/Comments   Colorectal Cancer Screening  Colonoscopy    Fecal Occult Blood Test (FOBT)/Fecal Immunochemical Test (FIT)  Fecal DNA/Cologuard Test  Flexible Sigmoidoscopy Age: 39-70 years old   Colonoscopy: every 10 years (May be performed more frequently if at higher risk)  OR  FOBT/FIT: every 1 year  OR  Cologuard: every 3 years  OR  Sigmoidoscopy: every 5 years  Screening may be recommended earlier than age 39 if at higher risk for colorectal cancer  Also, an individualized decision between you and your healthcare provider will decide whether screening between the ages of 74-80 would be appropriate   Colonoscopy: Not on file  FOBT/FIT: Not on file  Cologuard: Not on file  Sigmoidoscopy: Not on file          Prostate Cancer Screening Individualized decision between patient and health care provider in men between ages of 53-78   Medicare will cover every 12 months beginning on the day after your 50th birthday PSA: No results in last 5 years     Screening Not Indicated     Hepatitis C Screening Once for adults born between [facility]  More frequently in patients at high risk for Hepatitis C Hep C Antibody: Not on file        Diabetes Screening 1-2 times per year if you're at risk for diabetes or have pre-diabetes Fasting glucose: No results in last 5 years (No results in last 5 years)  A1C: 6 8 (6/15/2022)  Screening Not Indicated  History Diabetes   Cholesterol Screening Once every 5 years if you don't have a lipid disorder  May order more often based on risk factors  Lipid panel: 06/17/2022  Screening Not Indicated  History Lipid Disorder      Other Preventive Screenings Covered by Medicare:  Abdominal Aortic Aneurysm (AAA) Screening: covered once if your at risk  You're considered to be at risk if you have a family history of AAA or a male between the age of 73-68 who smoking at least 100 cigarettes in your lifetime  Lung Cancer Screening: covers low dose CT scan once per year if you meet all of the following conditions: (1) Age 50-69; (2) No signs or symptoms of lung cancer; (3) Current smoker or have quit smoking within the last 15 years; (4) You have a tobacco smoking history of at least 20 pack years (packs per day x number of years you smoked); (5) You get a written order from a healthcare provider  Glaucoma Screening: covered annually if you're considered high risk: (1) You have diabetes OR (2) Family history of glaucoma OR (3)  aged 48 and older OR (3)  American aged 72 and older  Osteoporosis Screening: covered every 2 years if you meet one of the following conditions: (1) Have a vertebral abnormality; (2) On glucocorticoid therapy for more than 3 months; (3) Have primary hyperparathyroidism; (4) On osteoporosis medications and need to assess response to drug therapy  HIV Screening: covered annually if you're between the age of 12-76  Also covered annually if you are younger than 13 and older than 72 with risk factors for HIV infection  For pregnant patients, it is covered up to 3 times per pregnancy      Immunizations:  Immunization Recommendations   Influenza Vaccine Annual influenza vaccination during flu season is recommended for all persons aged >= 6 months who do not have contraindications   Pneumococcal Vaccine   * Pneumococcal conjugate vaccine = PCV13 (Prevnar 13), PCV15 (Vaxneuvance), PCV20 (Prevnar 20)  * Pneumococcal polysaccharide vaccine = PPSV23 (Pneumovax) Adults 2364 years old: 1-3 doses may be recommended based on certain risk factors  Adults 72 years old: 1-2 doses may be recommended based off what pneumonia vaccine you previously received   Hepatitis B Vaccine 3 dose series if at intermediate or high risk (ex: diabetes, end stage renal disease, liver disease)   Tetanus (Td) Vaccine - COST NOT COVERED BY MEDICARE PART B Following completion of primary series, a booster dose should be given every 10 years to maintain immunity against tetanus  Td may also be given as tetanus wound prophylaxis  Tdap Vaccine - COST NOT COVERED BY MEDICARE PART B Recommended at least once for all adults  For pregnant patients, recommended with each pregnancy  Shingles Vaccine (Shingrix) - COST NOT COVERED BY MEDICARE PART B  2 shot series recommended in those aged 48 and above     Health Maintenance Due:  There are no preventive care reminders to display for this patient  Immunizations Due:      Topic Date Due    COVID-19 Vaccine (3 - Booster for Pfizer series) 09/26/2021    Influenza Vaccine (1) 09/01/2022     Advance Directives   What are advance directives? Advance directives are legal documents that state your wishes and plans for medical care  These plans are made ahead of time in case you lose your ability to make decisions for yourself  Advance directives can apply to any medical decision, such as the treatments you want, and if you want to donate organs  What are the types of advance directives? There are many types of advance directives, and each state has rules about how to use them  You may choose a combination of any of the following:  Living will: This is a written record of the treatment you want  You can also choose which treatments you do not want, which to limit, and which to stop at a certain time   This includes surgery, medicine, IV fluid, and tube feedings  Durable power of  for healthcare [facility]): This is a written record that states who you want to make healthcare choices for you when you are unable to make them for yourself  This person, called a proxy, is usually a family member or a friend  You may choose more than 1 proxy  Do not resuscitate (DNR) order:  A DNR order is used in case your heart stops beating or you stop breathing  It is a request not to have certain forms of treatment, such as CPR  A DNR order may be included in other types of advance directives  Medical directive: This covers the care that you want if you are in a coma, near death, or unable to make decisions for yourself  You can list the treatments you want for each condition  Treatment may include pain medicine, surgery, blood transfusions, dialysis, IV or tube feedings, and a ventilator (breathing machine)  Values history: This document has questions about your views, beliefs, and how you feel and think about life  This information can help others choose the care that you would choose  Why are advance directives important? An advance directive helps you control your care  Although spoken wishes may be used, it is better to have your wishes written down  Spoken wishes can be misunderstood, or not followed  Treatments may be given even if you do not want them  An advance directive may make it easier for your family to make difficult choices about your care  Weight Management   Why it is important to manage your weight:  Being overweight increases your risk of health conditions such as heart disease, high blood pressure, type 2 diabetes, and certain types of cancer  It can also increase your risk for osteoarthritis, sleep apnea, and other respiratory problems  Aim for a slow, steady weight loss  Even a small amount of weight loss can lower your risk of health problems    How to lose weight safely:  A safe and healthy way to lose weight is to eat fewer calories and get regular exercise  You can lose up about 1 pound a week by decreasing the number of calories you eat by 500 calories each day  Healthy meal plan for weight management:  A healthy meal plan includes a variety of foods, contains fewer calories, and helps you stay healthy  A healthy meal plan includes the following:  Eat whole-grain foods more often  A healthy meal plan should contain fiber  Fiber is the part of grains, fruits, and vegetables that is not broken down by your body  Whole-grain foods are healthy and provide extra fiber in your diet  Some examples of whole-grain foods are whole-wheat breads and pastas, oatmeal, brown rice, and bulgur  Eat a variety of vegetables every day  Include dark, leafy greens such as spinach, kale, xenia greens, and mustard greens  Eat yellow and orange vegetables such as carrots, sweet potatoes, and winter squash  Eat a variety of fruits every day  Choose fresh or canned fruit (canned in its own juice or light syrup) instead of juice  Fruit juice has very little or no fiber  Eat low-fat dairy foods  Drink fat-free (skim) milk or 1% milk  Eat fat-free yogurt and low-fat cottage cheese  Try low-fat cheeses such as mozzarella and other reduced-fat cheeses  Choose meat and other protein foods that are low in fat  Choose beans or other legumes such as split peas or lentils  Choose fish, skinless poultry (chicken or turkey), or lean cuts of red meat (beef or pork)  Before you cook meat or poultry, cut off any visible fat  Use less fat and oil  Try baking foods instead of frying them  Add less fat, such as margarine, sour cream, regular salad dressing and mayonnaise to foods  Eat fewer high-fat foods  Some examples of high-fat foods include french fries, doughnuts, ice cream, and cakes  Eat fewer sweets  Limit foods and drinks that are high in sugar  This includes candy, cookies, regular soda, and sweetened drinks    Exercise:  Exercise at least 30 minutes per day on most days of the week  Some examples of exercise include walking, biking, dancing, and swimming  You can also fit in more physical activity by taking the stairs instead of the elevator or parking farther away from stores  Ask your healthcare provider about the best exercise plan for you  © Copyright [employer] 2018 Information is for End User's use only and may not be sold, redistributed or otherwise used for commercial purposes   All illustrations and images included in CareNotes® are the copyrighted property of A D A M , Inc  or [address] [place], [place] [address]

## 2022-10-21 ENCOUNTER — TELEPHONE (OUTPATIENT)
Dept: FAMILY MEDICINE CLINIC | Facility: HOSPITAL | Age: 82
End: 2022-10-21

## 2022-10-21 DIAGNOSIS — R73.9 HYPERGLYCEMIA: ICD-10-CM

## 2022-10-21 DIAGNOSIS — E11.9 TYPE 2 DIABETES MELLITUS WITHOUT COMPLICATION, WITHOUT LONG-TERM CURRENT USE OF INSULIN (HCC): Primary | ICD-10-CM

## 2022-10-21 RX ORDER — FLASH GLUCOSE SENSOR
1 KIT MISCELLANEOUS
Qty: 4 EACH | Refills: 3 | Status: SHIPPED | OUTPATIENT
Start: 2022-10-21

## 2022-10-21 RX ORDER — FLASH GLUCOSE SCANNING READER
4 EACH MISCELLANEOUS
Qty: 1 EACH | Refills: 0 | Status: SHIPPED | OUTPATIENT
Start: 2022-10-21

## 2022-10-21 NOTE — TELEPHONE ENCOUNTER
See CXOWAREhart message from 10/19  Patient contacted insurance company and they will 80%; however,he needs a   prescription sent to his [facility]) pharmacy for the   [employer]  Please confirm with son

## 2023-01-01 DIAGNOSIS — R03.0 ELEVATED BP WITHOUT DIAGNOSIS OF HYPERTENSION: ICD-10-CM

## 2023-01-01 RX ORDER — AMLODIPINE BESYLATE 5 MG/1
TABLET ORAL
Qty: 90 TABLET | Refills: 1 | Status: SHIPPED | OUTPATIENT
Start: 2023-01-01

## 2023-01-03 ENCOUNTER — OFFICE VISIT (OUTPATIENT)
Dept: FAMILY MEDICINE CLINIC | Facility: HOSPITAL | Age: 83
End: 2023-01-03

## 2023-01-03 VITALS
HEIGHT: 69 IN | TEMPERATURE: 98.2 F | WEIGHT: 173 LBS | OXYGEN SATURATION: 97 % | BODY MASS INDEX: 25.62 KG/M2 | HEART RATE: 84 BPM | SYSTOLIC BLOOD PRESSURE: 138 MMHG | DIASTOLIC BLOOD PRESSURE: 80 MMHG

## 2023-01-03 DIAGNOSIS — R09.81 NASAL SINUS CONGESTION: Primary | ICD-10-CM

## 2023-01-03 DIAGNOSIS — E11.9 TYPE 2 DIABETES MELLITUS WITHOUT COMPLICATION, WITHOUT LONG-TERM CURRENT USE OF INSULIN (HCC): ICD-10-CM

## 2023-01-03 DIAGNOSIS — Z13.0 SCREENING FOR IRON DEFICIENCY ANEMIA: ICD-10-CM

## 2023-01-03 DIAGNOSIS — E78.2 MIXED HYPERLIPIDEMIA: ICD-10-CM

## 2023-01-03 DIAGNOSIS — N18.30 STAGE 3 CHRONIC KIDNEY DISEASE, UNSPECIFIED WHETHER STAGE 3A OR 3B CKD (HCC): ICD-10-CM

## 2023-01-03 LAB — SL AMB POCT HEMOGLOBIN AIC: 7 (ref ?–6.5)

## 2023-01-03 NOTE — PROGRESS NOTES
[facility],     Assessment/Plan:      Diagnosis ICD-10-CM Associated Orders   1  Nasal sinus congestion  R09 81       2  Type 2 diabetes mellitus without complication, without long-term current use of insulin ([facility])  E11 9 POCT hemoglobin A1c     Hemoglobin A1C     Comprehensive metabolic panel     Microalbumin / creatinine urine ratio     Hemoglobin A1C     Comprehensive metabolic panel     Microalbumin / creatinine urine ratio      3  Stage 3 chronic kidney disease, unspecified whether stage 3a or 3b CKD ([facility])  N18 30       4  Mixed hyperlipidemia  E78 2 Lipid Panel with Direct LDL reflex     Lipid Panel with Direct LDL reflex      5  Screening for iron deficiency anemia  Z13 0 CBC and differential     CBC and differential        • A1c 7 0 today  Much improved  • Labs ordered  No med changes  • 2 saline rinse samples given  Return in 4 months (on 5/3/2023) for Diabetes Follow-up  • Patient may call or return to office with any questions or concerns  ______________________________________________________________________  Subjective:     Patient ID: Chapito Shore is a 81 y o  male  HPI  Chapito Shore  Chief Complaint   Patient presents with   • Sinus Problem     COVID era has given him a bunch of sinus issues  Today seems slightly better  1 week before damon had company, eyes & nose runny  Sneezing & had to leave table to take a break  Has been doing saline rinses  L side of nose always clogged  Holding claritin & flonase  Doesn't want to use forever  BMI Counseling: Body mass index is 25 55 kg/m²  The BMI is above normal  Nutrition recommendations include encouraging healthy choices of fruits and vegetables  Exercise recommendations include moderate physical activity 150 minutes/week and strength training exercises  Rationale for BMI follow-up plan is due to patient being overweight or obese           The folloing portions of the patient's history were reviewed and updated as appropriate: allergies, current medications, past medical history, and problem list     Review of Systems   Constitutional: Negative for chills and fever  HENT: Positive for congestion, postnasal drip, rhinorrhea and sneezing  Negative for voice change  Eyes: Negative for pain and itching  Respiratory: Negative for cough and shortness of breath  Cardiovascular: Negative for chest pain and palpitations  Neurological: Positive for headaches (very mild)  Negative for dizziness and light-headedness  Objective:      Vitals:    01/03/23 1032   BP: 160/80   Pulse: 84   Temp: 98 2 °F (36 8 °C)   SpO2: 97%      Physical Exam  Vitals reviewed  Constitutional:       Appearance: Normal appearance  He is well-developed and normal weight  He is not ill-appearing  HENT:      Head: Normocephalic and atraumatic  Comments: No major sinus TTP     Right Ear: Tympanic membrane, ear canal and external ear normal       Left Ear: Tympanic membrane, ear canal and external ear normal       Nose: Nose normal  No congestion  Mouth/Throat:      Mouth: Mucous membranes are moist       Pharynx: Oropharynx is clear  No oropharyngeal exudate  Eyes:      General: No scleral icterus  Right eye: No discharge  Left eye: No discharge  Cardiovascular:      Rate and Rhythm: Normal rate and regular rhythm  Pulses: Normal pulses  Heart sounds: Normal heart sounds  No murmur heard  Pulmonary:      Effort: Pulmonary effort is normal  No respiratory distress  Breath sounds: Normal breath sounds  No stridor  No wheezing  Musculoskeletal:      Cervical back: Normal range of motion  Skin:     General: Skin is warm and dry  Neurological:      Mental Status: He is alert and oriented to person, place, and time  Psychiatric:         Behavior: Behavior normal            Portions of the record may have been created with voice recognition software   Occasional wrong word or "sound alike" substitutions may have occurred due to the inherent limitations of voice recognition software  Please review the chart carefully and recognize, using context, where substitutions/typographical errors may have occurred

## 2023-03-15 ENCOUNTER — HOSPITAL ENCOUNTER (INPATIENT)
Facility: HOSPITAL | Age: 83
LOS: 2 days | Discharge: HOME | End: 2023-03-17
Attending: EMERGENCY MEDICINE | Admitting: ANESTHESIOLOGY

## 2023-03-15 ENCOUNTER — APPOINTMENT (INPATIENT)
Dept: NON INVASIVE DIAGNOSTICS | Facility: HOSPITAL | Age: 83
End: 2023-03-15

## 2023-03-15 ENCOUNTER — APPOINTMENT (EMERGENCY)
Dept: RADIOLOGY | Facility: HOSPITAL | Age: 83
End: 2023-03-15

## 2023-03-15 ENCOUNTER — ANESTHESIA EVENT (INPATIENT)
Dept: NON INVASIVE DIAGNOSTICS | Facility: HOSPITAL | Age: 83
End: 2023-03-15

## 2023-03-15 ENCOUNTER — OFFICE VISIT (OUTPATIENT)
Dept: FAMILY MEDICINE CLINIC | Facility: HOSPITAL | Age: 83
End: 2023-03-15

## 2023-03-15 VITALS
HEART RATE: 40 BPM | DIASTOLIC BLOOD PRESSURE: 58 MMHG | SYSTOLIC BLOOD PRESSURE: 170 MMHG | OXYGEN SATURATION: 97 % | HEIGHT: 69 IN | WEIGHT: 173 LBS | BODY MASS INDEX: 25.62 KG/M2

## 2023-03-15 DIAGNOSIS — E11.9 TYPE 2 DIABETES MELLITUS WITHOUT COMPLICATION, WITHOUT LONG-TERM CURRENT USE OF INSULIN (HCC): ICD-10-CM

## 2023-03-15 DIAGNOSIS — I45.2 BIFASCICULAR BLOCK: ICD-10-CM

## 2023-03-15 DIAGNOSIS — N18.30 STAGE 3 CHRONIC KIDNEY DISEASE, UNSPECIFIED WHETHER STAGE 3A OR 3B CKD (HCC): ICD-10-CM

## 2023-03-15 DIAGNOSIS — I10 BENIGN ESSENTIAL HTN: ICD-10-CM

## 2023-03-15 DIAGNOSIS — I44.2 COMPLETE HEART BLOCK (HCC): Primary | ICD-10-CM

## 2023-03-15 DIAGNOSIS — R00.1 BRADYCARDIA: Primary | ICD-10-CM

## 2023-03-15 LAB
2HR DELTA HS TROPONIN: 9 NG/L
4HR DELTA HS TROPONIN: 9 NG/L
ALBUMIN SERPL BCP-MCNC: 3.8 G/DL (ref 3.5–5)
ALP SERPL-CCNC: 81 U/L (ref 46–116)
ALT SERPL W P-5'-P-CCNC: 39 U/L (ref 12–78)
ANION GAP SERPL CALCULATED.3IONS-SCNC: 5 MMOL/L (ref 4–13)
AST SERPL W P-5'-P-CCNC: 17 U/L (ref 5–45)
ATRIAL RATE: 94 BPM
BASOPHILS # BLD AUTO: 0.05 THOUSANDS/ÂΜL (ref 0–0.1)
BASOPHILS NFR BLD AUTO: 1 % (ref 0–1)
BILIRUB SERPL-MCNC: 0.6 MG/DL (ref 0.2–1)
BUN SERPL-MCNC: 20 MG/DL (ref 5–25)
CALCIUM SERPL-MCNC: 9.4 MG/DL (ref 8.3–10.1)
CARDIAC TROPONIN I PNL SERPL HS: 16 NG/L
CARDIAC TROPONIN I PNL SERPL HS: 25 NG/L
CARDIAC TROPONIN I PNL SERPL HS: 25 NG/L
CHLORIDE SERPL-SCNC: 108 MMOL/L (ref 96–108)
CO2 SERPL-SCNC: 24 MMOL/L (ref 21–32)
CREAT SERPL-MCNC: 1.26 MG/DL (ref 0.6–1.3)
EOSINOPHIL # BLD AUTO: 0.03 THOUSAND/ÂΜL (ref 0–0.61)
EOSINOPHIL NFR BLD AUTO: 0 % (ref 0–6)
ERYTHROCYTE [DISTWIDTH] IN BLOOD BY AUTOMATED COUNT: 11.3 % (ref 11.6–15.1)
GFR SERPL CREATININE-BSD FRML MDRD: 53 ML/MIN/1.73SQ M
GLUCOSE SERPL-MCNC: 126 MG/DL (ref 65–140)
GLUCOSE SERPL-MCNC: 237 MG/DL (ref 65–140)
GLUCOSE SERPL-MCNC: 250 MG/DL (ref 65–140)
HCT VFR BLD AUTO: 44.9 % (ref 36.5–49.3)
HGB BLD-MCNC: 15 G/DL (ref 12–17)
IMM GRANULOCYTES # BLD AUTO: 0.04 THOUSAND/UL (ref 0–0.2)
IMM GRANULOCYTES NFR BLD AUTO: 0 % (ref 0–2)
LYMPHOCYTES # BLD AUTO: 1.23 THOUSANDS/ÂΜL (ref 0.6–4.47)
LYMPHOCYTES NFR BLD AUTO: 12 % (ref 14–44)
MCH RBC QN AUTO: 33.3 PG (ref 26.8–34.3)
MCHC RBC AUTO-ENTMCNC: 33.4 G/DL (ref 31.4–37.4)
MCV RBC AUTO: 100 FL (ref 82–98)
MONOCYTES # BLD AUTO: 0.68 THOUSAND/ÂΜL (ref 0.17–1.22)
MONOCYTES NFR BLD AUTO: 7 % (ref 4–12)
NEUTROPHILS # BLD AUTO: 8.05 THOUSANDS/ÂΜL (ref 1.85–7.62)
NEUTS SEG NFR BLD AUTO: 80 % (ref 43–75)
NRBC BLD AUTO-RTO: 0 /100 WBCS
P AXIS: 71 DEGREES
PLATELET # BLD AUTO: 250 THOUSANDS/UL (ref 149–390)
PMV BLD AUTO: 10.7 FL (ref 8.9–12.7)
POTASSIUM SERPL-SCNC: 4.1 MMOL/L (ref 3.5–5.3)
PROT SERPL-MCNC: 7.8 G/DL (ref 6.4–8.4)
QRS AXIS: -88 DEGREES
QRSD INTERVAL: 156 MS
QT INTERVAL: 532 MS
QTC INTERVAL: 428 MS
RBC # BLD AUTO: 4.51 MILLION/UL (ref 3.88–5.62)
SODIUM SERPL-SCNC: 137 MMOL/L (ref 135–147)
T WAVE AXIS: -44 DEGREES
VENTRICULAR RATE: 39 BPM
WBC # BLD AUTO: 10.08 THOUSAND/UL (ref 4.31–10.16)

## 2023-03-15 PROCEDURE — 0JH606Z INSERTION OF PACEMAKER, DUAL CHAMBER INTO CHEST SUBCUTANEOUS TISSUE AND FASCIA, OPEN APPROACH: ICD-10-PCS | Performed by: INTERNAL MEDICINE

## 2023-03-15 PROCEDURE — 02H63JZ INSERTION OF PACEMAKER LEAD INTO RIGHT ATRIUM, PERCUTANEOUS APPROACH: ICD-10-PCS | Performed by: INTERNAL MEDICINE

## 2023-03-15 PROCEDURE — 02HK3JZ INSERTION OF PACEMAKER LEAD INTO RIGHT VENTRICLE, PERCUTANEOUS APPROACH: ICD-10-PCS | Performed by: INTERNAL MEDICINE

## 2023-03-15 RX ORDER — AMOXICILLIN 250 MG
2 CAPSULE ORAL 2 TIMES DAILY
Status: DISCONTINUED | OUTPATIENT
Start: 2023-03-15 | End: 2023-03-17 | Stop reason: HOSPADM

## 2023-03-15 RX ORDER — HYDRALAZINE HYDROCHLORIDE 20 MG/ML
10 INJECTION INTRAMUSCULAR; INTRAVENOUS ONCE
Status: COMPLETED | OUTPATIENT
Start: 2023-03-15 | End: 2023-03-15

## 2023-03-15 RX ORDER — PRAVASTATIN SODIUM 40 MG
40 TABLET ORAL
Status: DISCONTINUED | OUTPATIENT
Start: 2023-03-15 | End: 2023-03-17 | Stop reason: HOSPADM

## 2023-03-15 RX ORDER — HYDRALAZINE HYDROCHLORIDE 20 MG/ML
20 INJECTION INTRAMUSCULAR; INTRAVENOUS ONCE
Status: COMPLETED | OUTPATIENT
Start: 2023-03-15 | End: 2023-03-15

## 2023-03-15 RX ORDER — DIPHENOXYLATE HYDROCHLORIDE AND ATROPINE SULFATE 2.5; .025 MG/1; MG/1
1 TABLET ORAL DAILY
COMMUNITY

## 2023-03-15 RX ORDER — CHLORHEXIDINE GLUCONATE 0.12 MG/ML
15 RINSE ORAL EVERY 12 HOURS SCHEDULED
Status: DISCONTINUED | OUTPATIENT
Start: 2023-03-15 | End: 2023-03-17 | Stop reason: HOSPADM

## 2023-03-15 RX ORDER — CEFAZOLIN SODIUM 1 G/50ML
1000 SOLUTION INTRAVENOUS ONCE
Status: DISCONTINUED | OUTPATIENT
Start: 2023-03-16 | End: 2023-03-17 | Stop reason: HOSPADM

## 2023-03-15 RX ORDER — INSULIN LISPRO 100 [IU]/ML
1-5 INJECTION, SOLUTION INTRAVENOUS; SUBCUTANEOUS EVERY 6 HOURS SCHEDULED
Status: DISCONTINUED | OUTPATIENT
Start: 2023-03-15 | End: 2023-03-17 | Stop reason: HOSPADM

## 2023-03-15 RX ORDER — HEPARIN SODIUM 5000 [USP'U]/ML
5000 INJECTION, SOLUTION INTRAVENOUS; SUBCUTANEOUS EVERY 8 HOURS SCHEDULED
Status: DISCONTINUED | OUTPATIENT
Start: 2023-03-15 | End: 2023-03-17 | Stop reason: HOSPADM

## 2023-03-15 RX ORDER — POLYETHYLENE GLYCOL 3350 17 G/17G
17 POWDER, FOR SOLUTION ORAL DAILY PRN
Status: DISCONTINUED | OUTPATIENT
Start: 2023-03-15 | End: 2023-03-17 | Stop reason: HOSPADM

## 2023-03-15 RX ORDER — ACETAMINOPHEN 325 MG/1
975 TABLET ORAL EVERY 6 HOURS PRN
Status: DISCONTINUED | OUTPATIENT
Start: 2023-03-15 | End: 2023-03-17 | Stop reason: HOSPADM

## 2023-03-15 RX ADMIN — HEPARIN SODIUM 5000 UNITS: 5000 INJECTION INTRAVENOUS; SUBCUTANEOUS at 21:00

## 2023-03-15 RX ADMIN — PRAVASTATIN SODIUM 40 MG: 40 TABLET ORAL at 17:13

## 2023-03-15 RX ADMIN — HYDRALAZINE HYDROCHLORIDE 10 MG: 20 INJECTION, SOLUTION INTRAMUSCULAR; INTRAVENOUS at 15:47

## 2023-03-15 RX ADMIN — CHLORHEXIDINE GLUCONATE 0.12% ORAL RINSE 15 ML: 1.2 LIQUID ORAL at 20:10

## 2023-03-15 RX ADMIN — INSULIN LISPRO 2 UNITS: 100 INJECTION, SOLUTION INTRAVENOUS; SUBCUTANEOUS at 18:24

## 2023-03-15 RX ADMIN — HEPARIN SODIUM 5000 UNITS: 5000 INJECTION INTRAVENOUS; SUBCUTANEOUS at 15:46

## 2023-03-15 RX ADMIN — HYDRALAZINE HYDROCHLORIDE 20 MG: 20 INJECTION, SOLUTION INTRAMUSCULAR; INTRAVENOUS at 18:36

## 2023-03-15 NOTE — ASSESSMENT & PLAN NOTE
• Assessemt: CKD 3  o Continue to monitor BUN/Cr   - Baseline: 1 42, limited data  - Today:1 26  o Continue to Monitor I/O  o Avoid nephrotoxic Medications

## 2023-03-15 NOTE — ASSESSMENT & PLAN NOTE
· Assessment:  · Chronically Maintained on Zocor  · Most Recent Lipids 6/17/22:  · Total cholesterol 170, triglycerides 130, HDL 48, LDL 99  · Plan  · Initiate Pravastatin 40 Daily  · Transition to home Zocor at discharge

## 2023-03-15 NOTE — ED PROVIDER NOTES
History  Chief Complaint   Patient presents with   • Slow Heart Rate     Pt sent in for pacemaker placement from PCP in [place], pt denies CP or SOB or dizziness     HPI     Patient is an 80 y/o M with PMH DM presenting from outpatient appointment with concern for bradycardia  Pt states he has felt generalized fatigue for the past few weeks and was finally seen today, and was told "my heart rate is too low " He denies any chest pain, shortness of breath, lightheadedness/syncope, weakness  Per provider note from today, pt found in complete heart block and was recommended for admission for pacemaker placement  Pt aware of this plan  Denies HA, vision changes, numbness, leg/arm weakness or numbness, leg swelling  Prior to Admission Medications   Prescriptions Last Dose Informant Patient Reported? Taking?    Continuous Blood Gluc Sensor ([employer] Din[employer]ah 14 Day Sensor) [facility]   No No   Sig: Use 1 application every 14 (fourteen) days   amLODIPine (NORVASC) 5 mg tablet   No No   Sig: take 1 tablet by mouth once daily   fluticasone (FLONASE) 50 mcg/act nasal spray   No No   Si spray into each nostril daily   glimepiride (AMARYL) 4 mg tablet   No No   Sig: Take 1 tablet (4 mg total) by mouth daily with breakfast   lisinopril (ZESTRIL) 10 mg tablet   No No   Sig: Take 1 tablet (10 mg total) by mouth daily   loratadine (CLARITIN) 10 mg tablet   No No   Sig: Take 1 tablet (10 mg total) by mouth daily   metFORMIN (GLUCOPHAGE-XR) 500 mg 24 hr tablet   No No   Sig: Take 1 tablet (500 mg total) by mouth 2 (two) times a day with meals   multivitamin (THERAGRAN) TABS   Yes No   Sig: Take 1 tablet by mouth daily   simvastatin (ZOCOR) 20 mg tablet   No No   Sig: Take 1 tablet (20 mg total) by mouth daily at bedtime      Facility-Administered Medications: None       Past Medical History:   Diagnosis Date   • Diabetes mellitus ([facility] [address] )        Past Surgical History:   Procedure Laterality Date   • NO PAST SURGERIES         Family History   Problem Relation Age of Onset   • Diabetes Sister    • Diabetes Brother    • Colon cancer Neg Hx    • Colon polyps Neg Hx      I have reviewed and agree with the history as documented  E-Cigarette/Vaping   • E-Cigarette Use Never User      E-Cigarette/Vaping Substances   • Nicotine No    • THC No    • CBD No    • Flavoring No    • Other No    • Unknown No      Social History     Tobacco Use   • Smoking status: Never   • Smokeless tobacco: Never   Vaping Use   • Vaping Use: Never used   Substance Use Topics   • Alcohol use: Not Currently   • Drug use: Never        Review of Systems   Constitutional: Positive for fatigue  Negative for chills and fever  HENT: Negative for ear pain and sore throat  Eyes: Negative for pain and visual disturbance  Respiratory: Negative for cough and shortness of breath  Cardiovascular: Negative for chest pain and palpitations  Gastrointestinal: Negative for abdominal pain and vomiting  Genitourinary: Negative for dysuria and hematuria  Musculoskeletal: Negative for arthralgias and back pain  Skin: Negative for color change and rash  Neurological: Negative for seizures and syncope  All other systems reviewed and are negative  Physical Exam  ED Triage Vitals   Temperature Pulse Respirations Blood Pressure SpO2   03/15/23 1252 03/15/23 1252 03/15/23 1252 03/15/23 1252 03/15/23 1252   97 5 °F (36 4 °C) (!) 43 18 (!) 227/105 99 %      Temp Source Heart Rate Source Patient Position - Orthostatic VS BP Location FiO2 (%)   03/15/23 1252 03/15/23 1252 03/15/23 1252 03/15/23 1252 --   Oral Monitor Sitting Right arm       Pain Score       03/15/23 1901       No Pain             Orthostatic Vital Signs  Vitals:    03/15/23 2330 03/16/23 0232 03/16/23 0355 03/16/23 0608   BP: (!) 174/77 (!) 206/87 (!) 178/75 167/74   Pulse: (!) 40 (!) 39 (!) 41 (!) 40   Patient Position - Orthostatic VS:           Physical Exam  Vitals and nursing note reviewed  Constitutional:       General: He is not in acute distress  Appearance: He is well-developed  He is not toxic-appearing  HENT:      Head: Normocephalic and atraumatic  Right Ear: External ear normal       Left Ear: External ear normal       Nose: Nose normal       Mouth/Throat:      Pharynx: Oropharynx is clear  No oropharyngeal exudate or posterior oropharyngeal erythema  Eyes:      Extraocular Movements: Extraocular movements intact  Conjunctiva/sclera: Conjunctivae normal       Pupils: Pupils are equal, round, and reactive to light  Cardiovascular:      Rate and Rhythm: Regular rhythm  Bradycardia present  Pulses: Normal pulses  Heart sounds: Normal heart sounds  No murmur heard  No friction rub  No gallop  Pulmonary:      Effort: Pulmonary effort is normal  No respiratory distress  Breath sounds: Normal breath sounds  No wheezing, rhonchi or rales  Abdominal:      General: Abdomen is flat  Palpations: Abdomen is soft  Tenderness: There is no abdominal tenderness  There is no guarding or rebound  Musculoskeletal:         General: Normal range of motion  Cervical back: Normal range of motion  No rigidity  Right lower leg: No edema  Left lower leg: No edema  Skin:     General: Skin is warm and dry  Capillary Refill: Capillary refill takes less than 2 seconds  Neurological:      General: No focal deficit present  Mental Status: He is alert and oriented to person, place, and time     Psychiatric:         Mood and Affect: Mood normal          ED Medications  Medications   chlorhexidine (PERIDEX) 0 12 % oral rinse 15 mL (15 mL Mouth/Throat Given 3/16/23 0803)   senna-docusate sodium (SENOKOT S) 8 6-50 mg per tablet 2 tablet (2 tablets Oral Not Given 3/16/23 0803)   polyethylene glycol (MIRALAX) packet 17 g (has no administration in time range)   heparin (porcine) subcutaneous injection 5,000 Units (5,000 Units Subcutaneous Given 3/16/23 9641)   pravastatin (PRAVACHOL) tablet 40 mg (40 mg Oral Given 3/15/23 1713)   insulin lispro (HumaLOG) 100 units/mL subcutaneous injection 1-5 Units (1 Units Subcutaneous Not Given 3/16/23 1114)   acetaminophen (TYLENOL) tablet 975 mg (has no administration in time range)   ceFAZolin (ANCEF) IVPB (premix in dextrose) 1,000 mg 50 mL (has no administration in time range)   amLODIPine (NORVASC) tablet 5 mg (5 mg Oral Given 3/16/23 0803)   hydrALAZINE (APRESOLINE) injection 10 mg (10 mg Intravenous Given 3/15/23 1547)   hydrALAZINE (APRESOLINE) injection 20 mg (20 mg Intravenous Given 3/15/23 1836)   hydrALAZINE (APRESOLINE) injection 20 mg (20 mg Intravenous Given 3/16/23 0236)   potassium chloride (K-DUR,KLOR-CON) CR tablet 40 mEq (40 mEq Oral Given 3/16/23 0804)       Diagnostic Studies  Results Reviewed     Procedure Component Value Units Date/Time    HS Troponin I 4hr [[id number]]  (Normal) Collected: 03/15/23 1824    Lab Status: Final result Specimen: Blood from Arm, Left Updated: 03/15/23 1903     hs TnI 4hr 25 ng/L      Delta 4hr hsTnI 9 ng/L     Fingerstick Glucose (POCT) [[id number]]  (Abnormal) Collected: 03/15/23 1822    Lab Status: Final result Updated: 03/15/23 1829     POC Glucose 237 mg/dl     HS Troponin I 2hr [[id number]]  (Normal) Collected: 03/15/23 1633    Lab Status: Final result Specimen: Blood from Arm, Left Updated: 03/15/23 1709     hs TnI 2hr 25 ng/L      Delta 2hr hsTnI 9 ng/L     HS Troponin 0hr (reflex protocol) [[id number]]  (Normal) Collected: 03/15/23 1311    Lab Status: Final result Specimen: Blood from Arm, Left Updated: 03/15/23 1556     hs TnI 0hr 16 ng/L     Comprehensive metabolic panel [[id number]]  (Abnormal) Collected: 03/15/23 1311    Lab Status: Final result Specimen: Blood from Arm, Left Updated: 03/15/23 1356     Sodium 137 mmol/L      Potassium 4 1 mmol/L      Chloride 108 mmol/L      CO2 24 mmol/L      ANION GAP 5 mmol/L      BUN 20 mg/dL      Creatinine 1 26 mg/dL Glucose 250 mg/dL      Calcium 9 4 mg/dL      AST 17 U/L      ALT 39 U/L      Alkaline Phosphatase 81 U/L      Total Protein 7 8 g/dL      Albumin 3 8 g/dL      Total Bilirubin 0 60 mg/dL      eGFR 53 ml/min/1 73sq m     Narrative:      [place] guidelines for Chronic Kidney Disease (CKD):   •  Stage 1 with normal or high GFR (GFR > 90 mL/min/1 73 square meters)  •  Stage 2 Mild CKD (GFR = 60-89 mL/min/1 73 square meters)  •  Stage 3A Moderate CKD (GFR = 45-59 mL/min/1 73 square meters)  •  Stage 3B Moderate CKD (GFR = 30-44 mL/min/1 73 square meters)  •  Stage 4 Severe CKD (GFR = 15-29 mL/min/1 73 square meters)  •  Stage 5 End Stage CKD (GFR <15 mL/min/1 73 square meters)  Note: GFR calculation is accurate only with a steady state creatinine    CBC and differential [[id number]]  (Abnormal) Collected: 03/15/23 1311    Lab Status: Final result Specimen: Blood from Arm, Left Updated: 03/15/23 1323     WBC 10 08 Thousand/uL      RBC 4 51 Million/uL      Hemoglobin 15 0 g/dL      Hematocrit 44 9 %       fL      MCH 33 3 pg      MCHC 33 4 g/dL      RDW 11 3 %      MPV 10 7 fL      Platelets 086 Thousands/uL      nRBC 0 /100 WBCs      Neutrophils Relative 80 %      Immat GRANS % 0 %      Lymphocytes Relative 12 %      Monocytes Relative 7 %      Eosinophils Relative 0 %      Basophils Relative 1 %      Neutrophils Absolute 8 05 Thousands/µL      Immature Grans Absolute 0 04 Thousand/uL      Lymphocytes Absolute 1 23 Thousands/µL      Monocytes Absolute 0 68 Thousand/µL      Eosinophils Absolute 0 03 Thousand/µL      Basophils Absolute 0 05 Thousands/µL                  XR chest 1 view portable   ED Interpretation by Shoaib Ortiz DO (03/15 3721)   No acute cardiopulmonary pathology  Pacer pads in place  Final Result by Lizzie Goodrich MD (03/16 1167)      No active pulmonary disease                    Workstation performed: [id number]         XR chest portable    (Results Pending)   XR chest 2 views    (Results Pending)         Procedures  Procedures      ED Course  ED Course as of 03/16/23 1342   Wed Mar 15, 2023   1305 Talked with [facility] physician, complete heart block  Nickolas Anne wants to go to EP   1328 ECG 12 lead  Procedure Note: EKG  Date/Time: 03/15/23 1:28 PM   Interpreted by: Kwame Jennings MD  Indications / Diagnosis: CP  ECG reviewed by me, the ED Provider: yes   The EKG demonstrates:  Rhythm: sinus rhythm with complete heart block with ventricular rate of 39  Intervals: abnormal in setting of heart block  Axis: normal axis  QRS/Blocks: wide QRS  ST Changes: No acute ST Changes, no STD/TDOD  Medical Decision Making  82 y/o M presenting with several week history of fatigue and newly diagnosed complete heart block  Patient currently bradycardic to 40s but with elevated blood pressure and no c/o of syncope  Pacer pads placed on pt with code cart in room in event pt becomes unstable  EKG here redemonstrates complete heart block  Will start cardiac w/u and discuss with EP  EP made aware and state they will come see, timing of pacemaker TBD  Initially discussed with [facility] for admission however felt pt would need higher level of care until pacemaker can be placed  Discussed with critical care for SD1 admission which was accepted  Amount and/or Complexity of Data Reviewed  Labs: ordered  Radiology: ordered and independent interpretation performed  ECG/medicine tests:  Decision-making details documented in ED Course  Risk  Decision regarding hospitalization              Disposition  Final diagnoses:   Complete heart block ([address] )     Time reflects when diagnosis was documented in both MDM as applicable and the Disposition within this note     Time User Action Codes Description Comment    3/15/2023  1:27 PM Richard Marc Add [I44 2] Complete heart block ([address] )     3/15/2023  3:48 PM Alethea Gallegos Modify [I44 2] Complete heart block ([address] ) 3/15/2023  5:10 PM Clearence Lock Modify [I44 2] Complete heart block [facility])       ED Disposition     ED Disposition   Admit    Condition   Stable    Date/Time   Wed Mar 15, 2023  2:16 PM    Comment   Case was discussed with Dr Radha Kelly and the patient's admission status was agreed to be Admission Status: inpatient status to the service of Dr Radha Kelly   Follow-up Information    None         Current Discharge Medication List      CONTINUE these medications which have NOT CHANGED    Details   amLODIPine (NORVASC) 5 mg tablet take 1 tablet by mouth once daily  Qty: 90 tablet, Refills: 1    Associated Diagnoses: Elevated BP without diagnosis of hypertension      Continuous Blood Gluc Sensor ([employer]ah 14 Day Sensor) MISC Use 1 application every 14 (fourteen) days  Qty: 4 each, Refills: 3    Associated Diagnoses: Type 2 diabetes mellitus without complication, without long-term current use of insulin ([address] );  Hyperglycemia      fluticasone (FLONASE) 50 mcg/act nasal spray 1 spray into each nostril daily  Qty: 16 g, Refills: 2    Associated Diagnoses: Nasal sinus congestion      glimepiride (AMARYL) 4 mg tablet Take 1 tablet (4 mg total) by mouth daily with breakfast  Qty: 90 tablet, Refills: 3    Associated Diagnoses: Type 2 diabetes mellitus without complication, without long-term current use of insulin ([facility])      lisinopril (ZESTRIL) 10 mg tablet Take 1 tablet (10 mg total) by mouth daily  Qty: 90 tablet, Refills: 3    Associated Diagnoses: Primary hypertension      loratadine (CLARITIN) 10 mg tablet Take 1 tablet (10 mg total) by mouth daily  Qty: 90 tablet, Refills: 1    Associated Diagnoses: Nasal sinus congestion      metFORMIN (GLUCOPHAGE-XR) 500 mg 24 hr tablet Take 1 tablet (500 mg total) by mouth 2 (two) times a day with meals  Qty: 180 tablet, Refills: 2    Associated Diagnoses: Type 2 diabetes mellitus without complication, without long-term current use of insulin ([facility])      multivitamin [facility]) TABS Take 1 tablet by mouth daily      simvastatin (ZOCOR) 20 mg tablet Take 1 tablet (20 mg total) by mouth daily at bedtime  Qty: 90 tablet, Refills: 3    Associated Diagnoses: Mixed hyperlipidemia           No discharge procedures on file  PDMP Review     None           ED Provider  Attending physically available and evaluated Nory Short  COMPA managed the patient along with the ED Attending      Electronically Signed by         Tomer Barbosa MD  03/16/23 6616

## 2023-03-15 NOTE — ASSESSMENT & PLAN NOTE
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

## 2023-03-15 NOTE — ASSESSMENT & PLAN NOTE
· Assessment  · Maintained on Glimepiride 4 daily and metformin 500 BID   · Most recent A1C 7 0  · Plan  · Hold Home medications in the setting of NPO status for procedure  · SSI

## 2023-03-15 NOTE — H&P
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  · ECHO 10/4: LVEF 55% with mild hypokinesis in the inferior lateral wall  Grade 1 diastolic function, mild mitral valve regurg  · Minimally symptomatic due to fatigue, but no documented hypotension or mental status change  · Plan  · Patient is currently asymptomatic, continue cardiopulmonary monitoring  · Consult placed to Electrophysiology, plan for pacer placement on 3/16- NPO at [address] [place] [address]   · No current concern for Lyme, F/U TSH  · If patient becomes symptomatic, low threshold for pacing   · Follow-up ECHO      Benign essential HTN  Assessment & Plan  · Assessment:  · History of HTN maintained on Norvasc 5 and Lisinopril 10  · On arrival to ER /105, patient did no take blood pressure medications this morning  · Plan  · Attempt Blood Pressure control with 10mg Hydralazine  · Pending Reassessment of Blood Pressure with plan to restart home medications of Norvasc then lisinopril       Stage 3 chronic kidney disease, unspecified whether stage 3a or 3b CKD ([facility] )  Assessment & Plan  • Assessemt: CKD 3  o Continue to monitor BUN/Cr   - Baseline: 1 42, limited data  - Today:1 26  o Continue to Monitor I/O  o Avoid nephrotoxic Medications       Type 2 diabetes mellitus without complication, without long-term current use of insulin (HCC)  Assessment & Plan  · Assessment  · Maintained on Glimepiride 4 daily and metformin 500 BID   · Most recent A1C 7 0  · Plan  · Hold Home medications in the setting of NPO status for procedure  · SSI      Mixed hyperlipidemia  Assessment & Plan  · Assessment:  · Chronically Maintained on Zocor  · Most Recent Lipids 6/17/22:  · Total cholesterol 170, triglycerides 130, HDL 48, LDL 99  · Plan  · Initiate Pravastatin 40 Daily  · Transition to home Zocor at discharge    ----------------------------------------------------------------------------------------  HPI/24hr events: Frances Marvin is an 73-year-old male with past medical history of hypertension, hyperlipidemia, diabetes mellitus 2, CKD 3, chronic right bundle branch block with left anterior fascicular block, who presented to his primary care physician office today due to fatigue as well as low heart rate on his Apple Watch  Patient states that fatigue has been ongoing for about 3 weeks, fatigue actually improves when he gets on the treadmill to walk  PCP evaluated him with a EKG which revealed complete heart block  Patient was then referred to [facility] for evaluation by electrophysiology  On arrival patient has no other acute complaints besides chronic fatigue, he is hypertensive, not requiring pacing at this time  Patient appropriate for transfer out of the ICU today?: No  Disposition: Admit to Stepdown Level 1  Code Status: Level 1 - Full Code  ---------------------------------------------------------------------------------------  SUBJECTIVE  " I am just tired, otherwise I feel okay"    Review of Systems   Constitutional: Positive for activity change and fatigue  Negative for chills, diaphoresis and fever  Respiratory: Negative for cough, chest tightness and shortness of breath  Cardiovascular: Negative for chest pain and leg swelling  Gastrointestinal: Negative for abdominal pain, nausea and vomiting  Genitourinary: Negative for difficulty urinating, dysuria, frequency and urgency     Musculoskeletal: Negative for arthralgias  Skin: Negative for color change, pallor and rash  Neurological: Negative for dizziness, tremors, syncope, light-headedness and numbness  Psychiatric/Behavioral: Negative for agitation and behavioral problems  All other systems reviewed and are negative  Review of systems was reviewed and negative unless stated above in HPI/24-hour events   ---------------------------------------------------------------------------------------  OBJECTIVE    Vitals   Vitals:    03/15/23 1252 03/15/23 1410 03/15/23 1530 03/15/23 1545   BP: (!) 227/105 (!) 222/89 (!) 222/88 (!) 222/93   BP Location: Right arm      Pulse: (!) 43 (!) 37 (!) 37 (!) 37   Resp: 18 18 19 (!) 24   Temp: 97 5 °F (36 4 °C)      TempSrc: Oral      SpO2: 99% 99% 95% 96%     Temp (24hrs), Av 5 °F (36 4 °C), Min:97 5 °F (36 4 °C), Max:97 5 °F (36 4 °C)  Current: Temperature: 97 5 °F (36 4 °C)          Respiratory:  SpO2: SpO2: 96 %       Invasive/non-invasive ventilation settings   Respiratory    Lab Data (Last 4 hours)    None         O2/Vent Data (Last 4 hours)    None                Physical Exam  Vitals and nursing note reviewed  Constitutional:       General: He is not in acute distress  Appearance: Normal appearance  He is normal weight  He is not ill-appearing  Comments: Well-appearing and in no acute distress, sitting comfortably in bed  HENT:      Nose: Nose normal       Mouth/Throat:      Mouth: Mucous membranes are moist    Eyes:      Extraocular Movements: Extraocular movements intact  Pupils: Pupils are equal, round, and reactive to light  Cardiovascular:      Rate and Rhythm: Regular rhythm  Bradycardia present  Comments: Bradycardic but regular, no murmurs or rubs  Pulmonary:      Effort: Pulmonary effort is normal       Breath sounds: Normal breath sounds  Abdominal:      General: Abdomen is flat  Palpations: Abdomen is soft  Comments: Soft, nontender, nondistended    No rebound or guarding  Musculoskeletal:         General: Normal range of motion  Cervical back: Normal range of motion and neck supple  Skin:     General: Skin is warm  Capillary Refill: Capillary refill takes less than 2 seconds  Comments: Warm and well-perfused  2+ DP and radial pulses that correspond with telemetry rate in the 30s  Neurological:      General: No focal deficit present  Mental Status: He is alert and oriented to person, place, and time  Comments: GCS 15  CN 2-12 intact  PERRLA  Bilateral upper and lower extremities have 5/5 strength and sensation is intact  Finger to Nose and Heel to shin are intact  Speech normal                  Laboratory and Diagnostics:  Results from last 7 days   Lab Units 03/15/23  1311   WBC Thousand/uL 10 08   HEMOGLOBIN g/dL 15 0   HEMATOCRIT % 44 9   PLATELETS Thousands/uL 250   NEUTROS PCT % 80*   MONOS PCT % 7     Results from last 7 days   Lab Units 03/15/23  1311   SODIUM mmol/L 137   POTASSIUM mmol/L 4 1   CHLORIDE mmol/L 108   CO2 mmol/L 24   ANION GAP mmol/L 5   BUN mg/dL 20   CREATININE mg/dL 1 26   CALCIUM mg/dL 9 4   GLUCOSE RANDOM mg/dL 250*   ALT U/L 39   AST U/L 17   ALK PHOS U/L 81   ALBUMIN g/dL 3 8   TOTAL BILIRUBIN mg/dL 0 60                       ABG:    VBG:          Micro        EKG: Complete heart block with right bundle branch block and left anterior fascicular block  No ST changes, T wave inversion lead III, inversion in V3  Imaging: No acute cardiopulmonary disease, pacer pads in place  I have personally reviewed pertinent reports  Intake and Output  I/O     None          Height and Weights         There is no height or weight on file to calculate BMI    Weight (last 2 days)     None            Nutrition       Diet Orders   (From admission, onward)             Start     Ordered    03/16/23 0001  Diet NPO  Diet effective midnight        References:    Nutrtion Support Algorithm Enteral vs  Parenteral   Question Answer Comment   Diet Type NPO    RD to adjust diet per protocol? Yes        03/15/23 1524    03/15/23 1503  Diet Regular; Regular House  Diet effective now        References:    Nutrtion Support Algorithm Enteral vs  Parenteral   Question Answer Comment   Diet Type Regular    Regular Regular House    RD to adjust diet per protocol? Yes        03/15/23 1524                  Active Medications  Scheduled Meds:  Current Facility-Administered Medications   Medication Dose Route Frequency Provider Last Rate   • acetaminophen  975 mg Oral Q6H PRN Loida Kahn PA-C     • [START ON 3/16/2023] cefazolin  1,000 mg Intravenous Once Isis Rios PA-C     • chlorhexidine  15 mL Mouth/Throat Q12H [facility] John Mojica PA-C     • heparin (porcine)  5,000 Units Subcutaneous Q8H [facility] John castelan PA-C     • insulin lispro  1-5 Units Subcutaneous Q6H [facility] John Mojica PA-C     • polyethylene glycol  17 g Oral Daily PRN Loida Kahn PA-C     • pravastatin  40 mg Oral Daily With Elizabeth Glover PA-C     • senna-docusate sodium  2 tablet Oral BID Loida Kahn PA-C       Continuous Infusions:     PRN Meds:   acetaminophen, 975 mg, Q6H PRN  polyethylene glycol, 17 g, Daily PRN        Invasive Devices Review  Invasive Devices     Peripheral Intravenous Line  Duration           Peripheral IV 03/15/23 Distal;Left;Upper;Ventral (anterior) Arm <1 day                ---------------------------------------------------------------------------------------  Advance Directive and Living Will:      Power of :    POLST:    ---------------------------------------------------------------------------------------  Care Time Delivered:   No Critical Care time spent       Alveabdi Kahn PA-C      Portions of the record may have been created with voice recognition software  Occasional wrong word or "sound a like" substitutions may have occurred due to the inherent limitations of voice recognition software    Read the chart carefully and recognize, using context, where substitutions have occurred

## 2023-03-15 NOTE — CONSULTS
Consultation - Electrophysiology   Autumn Vigil 81 y o  male MRN: [id number]  Unit/Bed#: QCA Encounter: [id number]      Inpatient consult to Electrophysiology  Consult performed by: Marlon Saha MD  Consult ordered by: Florin Bauman DO        PCP: Harper Closs, DO   Outpatient Cardiologist: Zoya Jim MD    History of Present Illness   Physician Requesting Consult: Florin Bauman DO  Reason for Consult / Principal Problem: complete heart block    HPI: Autumn Vigil is a 81y o  year old right-handed male who presented after being sent by his primary care physician for finding of complete heart block during an office ECG  The patient himself reports that he has felt in good health with no complaints of chest pain, shortness of breath, palpitations, dizziness, lightheadedness, or fainting  He tells me that he walks 1 5 miles regularly and jogs on the treadmill regularly, having done so yesterday without any difficulty  He walks on a maintained park trail and has not been in wild grass or forests  He has felt increasingly tired over the past several weeks and his son bought a watch which showed a slow heart rate, prompting a visit to his PCP  He has a history of hypertension, hyperlipidemia, diabetes, CKD3, and chronic RBBB + left anterior fascicular block  He has no allergies and his sister did require a pacemaker, but he has no other known family history of cardiac disease  Review of Systems  Review of system was conducted and was negative except for as stated in the HPI        Historical Information   Past Medical History:   Diagnosis Date   • Diabetes mellitus ([facility] )      Past Surgical History:   Procedure Laterality Date   • NO PAST SURGERIES       Social History     Substance and Sexual Activity   Alcohol Use Not Currently     Social History     Substance and Sexual Activity   Drug Use Never     Social History     Tobacco Use   Smoking Status Never   Smokeless Tobacco Never     Family History: non-contributory    Meds/Allergies   Hospital Medications:   No current facility-administered medications for this encounter  Home Medications: (Not in a hospital admission)      No Known Allergies    Objective   Vitals: Blood pressure (!) 227/105, pulse (!) 43, temperature 97 5 °F (36 4 °C), temperature source Oral, resp  rate 18, SpO2 99 %  Orthostatic Blood Pressures    Flowsheet Row Most Recent Value   Blood Pressure 227/105 filed at 03/15/2023 1252   Patient Position - Orthostatic VS Sitting filed at 03/15/2023 1252            Invasive Devices     Peripheral Intravenous Line  Duration           Peripheral IV 03/15/23 Distal;Left;Upper;Ventral (anterior) Arm <1 day                Physical Exam  GEN: Christyne Angel appears well, alert and oriented x 3, pleasant and cooperative   HEENT:  Normocephalic, atraumatic, anicteric, moist mucous membranes  NECK: No JVD or carotid bruits   HEART: Regular rhythm, slow rate, normal S1 and S2  LUNGS: Clear to auscultation bilaterally; no wheezes, rales, or rhonchi; respiration nonlabored   EXTREMITIES: peripheral pulses palpable; no edema  NEURO: no gross focal findings  SKIN:  Dry, intact, warm to touch    Lab Results: I have personally reviewed pertinent lab results  Results from last 7 days   Lab Units 03/15/23  1311   POTASSIUM mmol/L 4 1   CO2 mmol/L 24   CHLORIDE mmol/L 108   BUN mg/dL 20   CREATININE mg/dL 1 26     Results from last 7 days   Lab Units 03/15/23  1311   HEMOGLOBIN g/dL 15 0   HEMATOCRIT % 44 9   PLATELETS Thousands/uL 250           ECHO:  No results found for this or any previous visit  Results for orders placed during the hospital encounter of 10/04/22    Echo complete w/ contrast if indicated    Interpretation Summary  •  Left Ventricle: Left ventricular cavity size is normal  Wall thickness is mildly increased  The left ventricular ejection fraction is 55%   Systolic function is normal  There is possible hypokinesis of the mid to apical inferolateral wall, but this may be a result of frequent ectopy  Diastolic function is mildly abnormal, consistent with grade I (abnormal) relaxation  •  Mitral Valve: There is mild regurgitation  Assessment/Plan     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    Paroxysmal atrial fibrillation    Right bundle branch block and left anterior fascicular block    Hypertension with hypertensive heart disease    Hyperlipidemia     DM2    CKD Stage 1   81year old male with bifascicular block and chronic hypertension, not on any AV ale blocking agents and with excellent exertional capacity for age presented after incidental finding of complete heart block on outpatient ECG  Symptoms concerning for increased fatigue recently  ECG from 8/2022 showing likely sinus arrhythmia with rate of 90 bpm, RBBB, LAFB  ECG today with complete heart block  QRS morphology same as in normal sinus rhythm, suggesting stable junctional escape  Telemetry in ED demonstrating sinus arrhythmia with atrial rate 75-90 bpm, complete AV block and a likely junctional escape rhythm at 40 bpm      Recent echocardiogram in 10/2022 with EF 55%, mild mitral regurgitation  Will need repeat echocardiogram, electrolytes appear normal   No concern for Lyme, will check TSH  Plan for pacemaker implantation tentatively tomorrow  Keep NPO after midnight  Can be admitted to any telemetry bed  While unlikely, if symptomatic at rest with dizziness, hypotension, or shortness of breath, can try Atropine IVP 1mg -> transcutaneous pacing       Poncho Thompson MD  Cardiology Fellow

## 2023-03-15 NOTE — PLAN OF CARE
Problem: PAIN - ADULT  Goal: Verbalizes/displays adequate comfort level or baseline comfort level  Description: Interventions:  - Encourage patient to monitor pain and request assistance  - Assess pain using appropriate pain scale  - Administer analgesics based on type and severity of pain and evaluate response  - Implement non-pharmacological measures as appropriate and evaluate response  - Consider cultural and social influences on pain and pain management  - Notify physician/advanced practitioner if interventions unsuccessful or patient reports new pain  Outcome: Progressing     Problem: SAFETY ADULT  Goal: Patient will remain free of falls  Description: INTERVENTIONS:  - Educate patient/family on patient safety including physical limitations  - Instruct patient to call for assistance with activity   - Consult OT/PT to assist with strengthening/mobility   - Keep Call bell within reach  - Keep bed low and locked with side rails adjusted as appropriate  - Keep care items and personal belongings within reach  - Initiate and maintain comfort rounds  - Make Fall Risk Sign visible to staff  - Offer Toileting every  Hours, in advance of need  - Initiate/Maintain alarm  - Obtain necessary fall risk management equipment:   - Apply yellow socks and bracelet for high fall risk patients  - Consider moving patient to room near nurses station  Outcome: Progressing     Problem: CARDIOVASCULAR - ADULT  Goal: Maintains optimal cardiac output and hemodynamic stability  Description: INTERVENTIONS:  - Monitor I/O, vital signs and rhythm  - Monitor for S/S and trends of decreased cardiac output  - Administer and titrate ordered vasoactive medications to optimize hemodynamic stability  - Assess quality of pulses, skin color and temperature  - Assess for signs of decreased coronary artery perfusion  - Instruct patient to report change in severity of symptoms  Outcome: Progressing  Goal: Absence of cardiac dysrhythmias or at baseline rhythm  Description: INTERVENTIONS:  - Continuous cardiac monitoring, vital signs, obtain 12 lead EKG if ordered  - Administer antiarrhythmic and heart rate control medications as ordered  - Monitor electrolytes and administer replacement therapy as ordered  Outcome: Progressing

## 2023-03-15 NOTE — ASSESSMENT & PLAN NOTE
· Assessment:  · History of HTN maintained on Norvasc 5 and Lisinopril 10  · On arrival to ER /105, patient did no take blood pressure medications this morning  · Plan  · Attempt Blood Pressure control with 10mg Hydralazine  · Pending Reassessment of Blood Pressure with plan to restart home medications of Norvasc then lisinopril

## 2023-03-15 NOTE — PROGRESS NOTES
[facility],     Assessment/Plan:      Diagnosis ICD-10-CM Associated Orders   1  Bradycardia  R00 1 Magnesium     Vitamin B12     Magnesium     Vitamin B12      2  Benign essential HTN  I10       3  Type 2 diabetes mellitus without complication, without long-term current use of insulin (HCC)  E11 9       4  Stage 3 chronic kidney disease, unspecified whether stage 3a or 3b CKD (HCC)  N18 30       5  Bifascicular block  I45 2         • CHB showing on EKG today  Recommended by Cardio to send to [facility] AND [facility] ED for adm & pacemaker  Son notified  Pt picked up by son & driven to B  ED notified  • Msg relayed to his cardiologist as well through tiger text  • EKG - 3/15/2023 - Sinus bradycardia, wide QRS, LAFB and RBBB  No follow-ups on file  • Patient may call or return to office with any questions or concerns  ______________________________________________________________________  Subjective:     Patient ID: Shell Swanson is a 81 y o  male  HPI  Shell Swanson  Chief Complaint   Patient presents with   • Fatigue   • Hypertension   • low heart rate      Starting to feel week, sometimes after the spirulina  On a few supplements - organic spirulina, advanced glucose, etc  Sometimes feels weak after taking spirulina  No other new meds  Due to f/u with Cardiology  Wt Readings from Last 3 Encounters:   03/15/23 78 5 kg (173 lb)   01/03/23 78 5 kg (173 lb)   10/12/22 78 9 kg (174 lb)        The following portions of the patient's history were reviewed and updated as appropriate: allergies, current medications, past medical history, and problem list     Review of Systems   Constitutional: Positive for fatigue  Respiratory: Negative for cough and shortness of breath  Cardiovascular: Negative for chest pain, palpitations and leg swelling  Skin: Negative for pallor  Neurological: Positive for weakness  Negative for dizziness, tremors, seizures, speech difficulty and headaches  Psychiatric/Behavioral: Negative for confusion  The patient is nervous/anxious  Objective:      Vitals:    03/15/23 1037   BP: 170/58   Pulse: (!) 40   SpO2: 97%      Physical Exam  Vitals reviewed  Constitutional:       General: He is not in acute distress  Appearance: Normal appearance  He is well-developed and normal weight  He is not ill-appearing  HENT:      Head: Normocephalic and atraumatic  Eyes:      General: No scleral icterus  Right eye: No discharge  Left eye: No discharge  Cardiovascular:      Rate and Rhythm: Regular rhythm  Bradycardia present  Pulses: Normal pulses  Heart sounds: Normal heart sounds  No murmur heard  No friction rub  Pulmonary:      Effort: Pulmonary effort is normal  No respiratory distress  Breath sounds: Normal breath sounds  No stridor  No wheezing  Musculoskeletal:      Cervical back: Normal range of motion  No rigidity  Right lower leg: No edema  Left lower leg: No edema  Skin:     General: Skin is warm and dry  Neurological:      Mental Status: He is alert and oriented to person, place, and time  Gait: Gait normal    Psychiatric:         Mood and Affect: Mood normal          Behavior: Behavior normal          Thought Content: Thought content normal          Judgment: Judgment normal            Portions of the record may have been created with voice recognition software  Occasional wrong word or "sound alike" substitutions may have occurred due to the inherent limitations of voice recognition software  Please review the chart carefully and recognize, using context, where substitutions/typographical errors may have occurred         Answers for HPI/ROS submitted by the patient on 3/13/2023  MedicAlert ID: [id number]  Disease duration: 12 Years  fatigue: Yes  weakness: Yes  confusion: No  dizziness: No  headaches: No  hunger: No  mood changes: No  nervous/anxious: Yes  pallor: No  seizures: No  sleepiness: No  speech difficulty: No  sweats: No  tremors: No  blackouts: No  hospitalization: No  nocturnal hypoglycemia: No  required assistance: No  CVA: No  heart disease: No  impotence: No  nephropathy: No  peripheral neuropathy: No  PVD: No  retinopathy: No  CAD risks: no known risk factors  Current treatments: oral agent (dual therapy)  Treatment compliance: some of the time  Home urines: <1 x per month  Monitoring compliance: no compliance

## 2023-03-15 NOTE — ED ATTENDING ATTESTATION
3/15/2023  Austin An DO, saw and evaluated the patient  I have discussed the patient with the resident/non-physician practitioner and agree with the resident's/non-physician practitioner's findings, Plan of Care, and MDM as documented in the resident's/non-physician practitioner's note, except where noted  All available labs and Radiology studies were reviewed  I was present for key portions of any procedure(s) performed by the resident/non-physician practitioner and I was immediately available to provide assistance  At this point I agree with the current assessment done in the Emergency Department  I have conducted an independent evaluation of this patient a history and physical is as follows:    Patient presents from his PCP office for admission for cardiac pacemaker after he presented there complaining of fatigue for the past 3+ weeks  In the office, an EKG was performed which demonstrated third-degree heart block  This is a new dysrhythmia for him  He recently got a treadmill and states that exercising made him feel better  He has not had any other associated cardiac symptoms  He does have essential hypertension for which he takes Norvasc  He presents hypertensive, not hypotensive  At rest, he denies significant symptoms  In the ED, his EKG continues to demonstrate complete heart block at 38 to 43 bpm   External pacer pads applied in the ED  Code cart placed at bedside  I spent an extended period explaining to the patient and his son what third-degree heart block is, what its dangers are and why he needs to be admitted for pacemaker implantation  They expressed understanding  No recent travel or sick contacts  ROS: Denies f/c, HA, LH/dizziness, diaphoresis, CP, SOB, abdominal pain, n/v/d  12 system ROS o/w negative       PE: NAD, appears comfortable, alert; PERRL, EOMI; MMM, no posterior oropharyngeal exudate, edema or erythema; HRR, no murmur, monitor shows third-degree heart block at 38 bpm correlating with palpable peripheral pulses; lungs CTA w/o w/r/r, POx 99% on RA (nl); abdomen s/nt/nd, nl BS in all 4 quadrants; (-) LE edema or calf TTP, FROM extremities x4; skin p/w/d; CNs GI/NF, oriented  MDM: Fatigue - new third-degree heart block, ACS/MI, less likely but at risk for toxins, abnormal electrolytes, ARF or PE  I independently reviewed and interpreted ordered labs from this encounter  A/P: Will check cardiac w/u, treat symptoms, admit for implantable cardiac pacemaker  ED Course         Critical Care Time  CriticalCare Time    Date/Time: 3/15/2023 1:27 PM  Performed by: Renata Bauer DO  Authorized by:  Renata Bauer DO     Critical care provider statement:     Critical care time (minutes):  30    Critical care time was exclusive of:  Separately billable procedures and treating other patients and teaching time    Critical care was necessary to treat or prevent imminent or life-threatening deterioration of the following conditions:  Cardiac failure    Critical care was time spent personally by me on the following activities:  Obtaining history from patient or surrogate, development of treatment plan with patient or surrogate, discussions with consultants, evaluation of patient's response to treatment, examination of patient, ordering and performing treatments and interventions, ordering and review of laboratory studies, ordering and review of radiographic studies, re-evaluation of patient's condition and review of old charts    I assumed direction of critical care for this patient from another provider in my specialty: no

## 2023-03-16 ENCOUNTER — APPOINTMENT (INPATIENT)
Dept: RADIOLOGY | Facility: HOSPITAL | Age: 83
End: 2023-03-16

## 2023-03-16 ENCOUNTER — ANESTHESIA (INPATIENT)
Dept: NON INVASIVE DIAGNOSTICS | Facility: HOSPITAL | Age: 83
End: 2023-03-16

## 2023-03-16 LAB
ALBUMIN SERPL BCP-MCNC: 3.5 G/DL (ref 3.5–5)
ALP SERPL-CCNC: 69 U/L (ref 46–116)
ALT SERPL W P-5'-P-CCNC: 32 U/L (ref 12–78)
ANION GAP SERPL CALCULATED.3IONS-SCNC: 7 MMOL/L (ref 4–13)
APICAL FOUR CHAMBER EJECTION FRACTION: 55 %
AST SERPL W P-5'-P-CCNC: 14 U/L (ref 5–45)
ATRIAL RATE: 42 BPM
ATRIAL RATE: 76 BPM
ATRIAL RATE: 77 BPM
ATRIAL RATE: 78 BPM
BASOPHILS # BLD AUTO: 0.04 THOUSANDS/ÂΜL (ref 0–0.1)
BASOPHILS NFR BLD AUTO: 0 % (ref 0–1)
BILIRUB SERPL-MCNC: 0.64 MG/DL (ref 0.2–1)
BUN SERPL-MCNC: 23 MG/DL (ref 5–25)
CA-I BLD-SCNC: 1.18 MMOL/L (ref 1.12–1.32)
CALCIUM SERPL-MCNC: 9.1 MG/DL (ref 8.3–10.1)
CHLORIDE SERPL-SCNC: 107 MMOL/L (ref 96–108)
CO2 SERPL-SCNC: 25 MMOL/L (ref 21–32)
CREAT SERPL-MCNC: 1.22 MG/DL (ref 0.6–1.3)
EOSINOPHIL # BLD AUTO: 0.17 THOUSAND/ÂΜL (ref 0–0.61)
EOSINOPHIL NFR BLD AUTO: 2 % (ref 0–6)
ERYTHROCYTE [DISTWIDTH] IN BLOOD BY AUTOMATED COUNT: 11.4 % (ref 11.6–15.1)
GFR SERPL CREATININE-BSD FRML MDRD: 55 ML/MIN/1.73SQ M
GLUCOSE SERPL-MCNC: 136 MG/DL (ref 65–140)
GLUCOSE SERPL-MCNC: 176 MG/DL (ref 65–140)
GLUCOSE SERPL-MCNC: 230 MG/DL (ref 65–140)
GLUCOSE SERPL-MCNC: 318 MG/DL (ref 65–140)
HCT VFR BLD AUTO: 42.7 % (ref 36.5–49.3)
HGB BLD-MCNC: 14 G/DL (ref 12–17)
IMM GRANULOCYTES # BLD AUTO: 0.02 THOUSAND/UL (ref 0–0.2)
IMM GRANULOCYTES NFR BLD AUTO: 0 % (ref 0–2)
LYMPHOCYTES # BLD AUTO: 2 THOUSANDS/ÂΜL (ref 0.6–4.47)
LYMPHOCYTES NFR BLD AUTO: 20 % (ref 14–44)
MAGNESIUM SERPL-MCNC: 2.1 MG/DL (ref 1.6–2.6)
MCH RBC QN AUTO: 32.6 PG (ref 26.8–34.3)
MCHC RBC AUTO-ENTMCNC: 32.8 G/DL (ref 31.4–37.4)
MCV RBC AUTO: 99 FL (ref 82–98)
MONOCYTES # BLD AUTO: 0.97 THOUSAND/ÂΜL (ref 0.17–1.22)
MONOCYTES NFR BLD AUTO: 10 % (ref 4–12)
MV E'TISSUE VEL-SEP: 12 CM/S
NEUTROPHILS # BLD AUTO: 6.58 THOUSANDS/ÂΜL (ref 1.85–7.62)
NEUTS SEG NFR BLD AUTO: 68 % (ref 43–75)
NRBC BLD AUTO-RTO: 0 /100 WBCS
P AXIS: 22 DEGREES
P AXIS: 27 DEGREES
P AXIS: 34 DEGREES
P AXIS: 50 DEGREES
PHOSPHATE SERPL-MCNC: 2.6 MG/DL (ref 2.3–4.1)
PLATELET # BLD AUTO: 223 THOUSANDS/UL (ref 149–390)
PMV BLD AUTO: 11.5 FL (ref 8.9–12.7)
POTASSIUM SERPL-SCNC: 3.7 MMOL/L (ref 3.5–5.3)
PR INTERVAL: 178 MS
PR INTERVAL: 180 MS
PR INTERVAL: 186 MS
PR INTERVAL: 188 MS
PROT SERPL-MCNC: 7 G/DL (ref 6.4–8.4)
QRS AXIS: -72 DEGREES
QRS AXIS: 46 DEGREES
QRS AXIS: 46 DEGREES
QRS AXIS: 49 DEGREES
QRSD INTERVAL: 156 MS
QRSD INTERVAL: 158 MS
QRSD INTERVAL: 160 MS
QRSD INTERVAL: 162 MS
QT INTERVAL: 464 MS
QT INTERVAL: 468 MS
QT INTERVAL: 472 MS
QT INTERVAL: 516 MS
QTC INTERVAL: 430 MS
QTC INTERVAL: 526 MS
QTC INTERVAL: 528 MS
QTC INTERVAL: 534 MS
RBC # BLD AUTO: 4.3 MILLION/UL (ref 3.88–5.62)
SL CV LV EF: 60
SODIUM SERPL-SCNC: 139 MMOL/L (ref 135–147)
T WAVE AXIS: -52 DEGREES
T WAVE AXIS: -89 DEGREES
T WAVE AXIS: 268 DEGREES
T WAVE AXIS: 270 DEGREES
TRICUSPID ANNULAR PLANE SYSTOLIC EXCURSION: 2 CM
TSH SERPL DL<=0.05 MIU/L-ACNC: 1.99 UIU/ML (ref 0.45–4.5)
VENTRICULAR RATE: 42 BPM
VENTRICULAR RATE: 76 BPM
VENTRICULAR RATE: 77 BPM
VENTRICULAR RATE: 78 BPM
WBC # BLD AUTO: 9.78 THOUSAND/UL (ref 4.31–10.16)

## 2023-03-16 DEVICE — LEAD 5076-52 MRI US RCMCRD
Type: IMPLANTABLE DEVICE | Site: HEART | Status: FUNCTIONAL
Brand: CAPSUREFIX NOVUS MRI™ SURESCAN®

## 2023-03-16 DEVICE — IPG W1DR01 AZURE XT DR MRI USA
Type: IMPLANTABLE DEVICE | Site: CHEST  WALL | Status: FUNCTIONAL
Brand: AZURE™ XT DR MRI SURESCAN™

## 2023-03-16 DEVICE — ENVELOPE CMRM6122 ABSORB MED MR
Type: IMPLANTABLE DEVICE | Site: CHEST  WALL | Status: FUNCTIONAL
Brand: TYRX™

## 2023-03-16 DEVICE — LEAD 383069 MRI US
Type: IMPLANTABLE DEVICE | Site: HEART | Status: FUNCTIONAL
Brand: SELECTSECURE™ MRI SURESCAN™

## 2023-03-16 RX ORDER — AMLODIPINE BESYLATE 5 MG/1
5 TABLET ORAL DAILY
Status: DISCONTINUED | OUTPATIENT
Start: 2023-03-16 | End: 2023-03-17 | Stop reason: HOSPADM

## 2023-03-16 RX ORDER — LISINOPRIL 10 MG/1
10 TABLET ORAL DAILY
Status: DISCONTINUED | OUTPATIENT
Start: 2023-03-16 | End: 2023-03-17 | Stop reason: HOSPADM

## 2023-03-16 RX ORDER — SODIUM CHLORIDE, SODIUM LACTATE, POTASSIUM CHLORIDE, CALCIUM CHLORIDE 600; 310; 30; 20 MG/100ML; MG/100ML; MG/100ML; MG/100ML
INJECTION, SOLUTION INTRAVENOUS CONTINUOUS PRN
Status: DISCONTINUED | OUTPATIENT
Start: 2023-03-16 | End: 2023-03-16

## 2023-03-16 RX ORDER — CEFAZOLIN SODIUM 2 G/50ML
SOLUTION INTRAVENOUS AS NEEDED
Status: DISCONTINUED | OUTPATIENT
Start: 2023-03-16 | End: 2023-03-16

## 2023-03-16 RX ORDER — HYDRALAZINE HYDROCHLORIDE 20 MG/ML
20 INJECTION INTRAMUSCULAR; INTRAVENOUS ONCE
Status: COMPLETED | OUTPATIENT
Start: 2023-03-16 | End: 2023-03-16

## 2023-03-16 RX ORDER — FENTANYL CITRATE 50 UG/ML
INJECTION, SOLUTION INTRAMUSCULAR; INTRAVENOUS AS NEEDED
Status: DISCONTINUED | OUTPATIENT
Start: 2023-03-16 | End: 2023-03-16

## 2023-03-16 RX ORDER — KETAMINE HYDROCHLORIDE 50 MG/ML
INJECTION, SOLUTION, CONCENTRATE INTRAMUSCULAR; INTRAVENOUS AS NEEDED
Status: DISCONTINUED | OUTPATIENT
Start: 2023-03-16 | End: 2023-03-16

## 2023-03-16 RX ORDER — LABETALOL HYDROCHLORIDE 5 MG/ML
10 INJECTION, SOLUTION INTRAVENOUS EVERY 6 HOURS PRN
Status: DISCONTINUED | OUTPATIENT
Start: 2023-03-16 | End: 2023-03-17 | Stop reason: HOSPADM

## 2023-03-16 RX ORDER — POTASSIUM CHLORIDE 20 MEQ/1
40 TABLET, EXTENDED RELEASE ORAL ONCE
Status: COMPLETED | OUTPATIENT
Start: 2023-03-16 | End: 2023-03-16

## 2023-03-16 RX ORDER — PROPOFOL 10 MG/ML
INJECTION, EMULSION INTRAVENOUS CONTINUOUS PRN
Status: DISCONTINUED | OUTPATIENT
Start: 2023-03-16 | End: 2023-03-16

## 2023-03-16 RX ORDER — LIDOCAINE HYDROCHLORIDE 10 MG/ML
INJECTION, SOLUTION EPIDURAL; INFILTRATION; INTRACAUDAL; PERINEURAL CODE/TRAUMA/SEDATION MEDICATION
Status: DISCONTINUED | OUTPATIENT
Start: 2023-03-16 | End: 2023-03-16 | Stop reason: HOSPADM

## 2023-03-16 RX ADMIN — FENTANYL CITRATE 25 MCG: 50 INJECTION, SOLUTION INTRAMUSCULAR; INTRAVENOUS at 12:06

## 2023-03-16 RX ADMIN — HEPARIN SODIUM 5000 UNITS: 5000 INJECTION INTRAVENOUS; SUBCUTANEOUS at 06:06

## 2023-03-16 RX ADMIN — INSULIN LISPRO 2 UNITS: 100 INJECTION, SOLUTION INTRAVENOUS; SUBCUTANEOUS at 23:29

## 2023-03-16 RX ADMIN — POTASSIUM CHLORIDE 40 MEQ: 1500 TABLET, EXTENDED RELEASE ORAL at 08:04

## 2023-03-16 RX ADMIN — HEPARIN SODIUM 5000 UNITS: 5000 INJECTION INTRAVENOUS; SUBCUTANEOUS at 14:25

## 2023-03-16 RX ADMIN — CEFAZOLIN SODIUM 2000 MG: 2 SOLUTION INTRAVENOUS at 11:34

## 2023-03-16 RX ADMIN — CEFAZOLIN SODIUM 1000 MG: 1 SOLUTION INTRAVENOUS at 13:58

## 2023-03-16 RX ADMIN — CHLORHEXIDINE GLUCONATE 0.12% ORAL RINSE 15 ML: 1.2 LIQUID ORAL at 21:06

## 2023-03-16 RX ADMIN — KETAMINE HYDROCHLORIDE 20 MG: 50 INJECTION, SOLUTION INTRAMUSCULAR; INTRAVENOUS at 11:55

## 2023-03-16 RX ADMIN — FENTANYL CITRATE 50 MCG: 50 INJECTION, SOLUTION INTRAMUSCULAR; INTRAVENOUS at 11:54

## 2023-03-16 RX ADMIN — HEPARIN SODIUM 5000 UNITS: 5000 INJECTION INTRAVENOUS; SUBCUTANEOUS at 21:06

## 2023-03-16 RX ADMIN — INSULIN LISPRO 1 UNITS: 100 INJECTION, SOLUTION INTRAVENOUS; SUBCUTANEOUS at 18:00

## 2023-03-16 RX ADMIN — SENNOSIDES AND DOCUSATE SODIUM 2 TABLET: 8.6; 5 TABLET ORAL at 16:36

## 2023-03-16 RX ADMIN — SODIUM CHLORIDE, SODIUM LACTATE, POTASSIUM CHLORIDE, AND CALCIUM CHLORIDE: .6; .31; .03; .02 INJECTION, SOLUTION INTRAVENOUS at 11:32

## 2023-03-16 RX ADMIN — PROPOFOL 20 MCG/KG/MIN: 10 INJECTION, EMULSION INTRAVENOUS at 11:37

## 2023-03-16 RX ADMIN — AMLODIPINE BESYLATE 5 MG: 5 TABLET ORAL at 08:03

## 2023-03-16 RX ADMIN — CHLORHEXIDINE GLUCONATE 0.12% ORAL RINSE 15 ML: 1.2 LIQUID ORAL at 08:03

## 2023-03-16 RX ADMIN — FENTANYL CITRATE 25 MCG: 50 INJECTION, SOLUTION INTRAMUSCULAR; INTRAVENOUS at 11:37

## 2023-03-16 RX ADMIN — ACETAMINOPHEN 975 MG: 325 TABLET ORAL at 16:35

## 2023-03-16 RX ADMIN — INSULIN LISPRO 1 UNITS: 100 INJECTION, SOLUTION INTRAVENOUS; SUBCUTANEOUS at 06:07

## 2023-03-16 RX ADMIN — HYDRALAZINE HYDROCHLORIDE 20 MG: 20 INJECTION, SOLUTION INTRAMUSCULAR; INTRAVENOUS at 02:36

## 2023-03-16 RX ADMIN — PRAVASTATIN SODIUM 40 MG: 40 TABLET ORAL at 16:32

## 2023-03-16 RX ADMIN — LISINOPRIL 10 MG: 10 TABLET ORAL at 14:25

## 2023-03-16 RX ADMIN — LABETALOL HYDROCHLORIDE 10 MG: 5 INJECTION, SOLUTION INTRAVENOUS at 14:25

## 2023-03-16 RX ADMIN — KETAMINE HYDROCHLORIDE 20 MG: 50 INJECTION, SOLUTION INTRAMUSCULAR; INTRAVENOUS at 12:09

## 2023-03-16 RX ADMIN — KETAMINE HYDROCHLORIDE 10 MG: 50 INJECTION, SOLUTION INTRAMUSCULAR; INTRAVENOUS at 11:47

## 2023-03-16 NOTE — ANESTHESIA POSTPROCEDURE EVALUATION
Post-Op Assessment Note    CV Status:  Stable    Pain management: adequate     Mental Status:  Sleepy   Hydration Status:  Stable   PONV Controlled:  None   Airway Patency:  Patent      Post Op Vitals Reviewed: Yes      Staff: CRNA         There were no known notable events for this encounter      BP      Temp      Pulse    Resp     SpO2

## 2023-03-16 NOTE — ASSESSMENT & PLAN NOTE
· Assessment:  · Chronically Maintained on Zocor  · Most Recent Lipids 6/17/22:  · Total cholesterol 170, triglycerides 130, HDL 48, LDL 99  · Plan  · Pravastatin 40 Daily  · Transition to home Zocor at discharge

## 2023-03-16 NOTE — PROGRESS NOTES
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     Right bundle branch block and left anterior fascicular block     Hypertension with hypertensive heart disease     Hyperlipidemia      DM2     CKD Stage 1   81year old male presented with symptoms of exertional fatigue and found to be in complete heart block on outpatient ECG  Admitted for EP evaluation, and agrees for pacemaker implantation  ECG showing sinus rhythm with junctional escape at 40 bpm      Telemetry reviewed overnight, remains in complete heart block with junctional escape rhythm  However, relatively asymptomatic at rest      Echocardiogram demonstrating preserved LV function  Regional wall motion abnormalities present inferiorly, which on further review are also present on prior study from last year  No Q waves present on ECG  Stress testing previously ordered, would recommend outpatient testing with exercise nuclear imaging  Not on AV blockers, TSH/electrolytes within normal limits, renal function stable  No allergies  Plan:  Dual chamber pacemaker implantation today    Subjective/Objective     Subjective: Resting comfortably in chair with no complaints      Objective:     Vitals: /74   Pulse (!) 40   Temp 98 9 °F (37 2 °C)   Resp (!) 24   Ht 5' 9" (1 753 m)   Wt 78 4 kg (172 lb 13 5 oz)   SpO2 98%   BMI 25 52 kg/m²   Vitals:    03/15/23 1710 03/15/23 1901   Weight: 78 5 kg (173 lb) 78 4 kg (172 lb 13 5 oz)     Orthostatic Blood Pressures    Flowsheet Row Most Recent Value   Blood Pressure 167/74 filed at 03/16/2023 0608   Patient Position - Orthostatic VS Sitting filed at 03/15/2023 1901            Intake/Output Summary (Last 24 hours) at 3/16/2023 1112  Last data filed at 3/16/2023 1051  Gross per 24 hour   Intake 0 ml   Output 550 ml   Net -550 ml       Invasive Devices Peripheral Intravenous Line  Duration           Peripheral IV 03/15/23 Distal;Left;Upper;Ventral (anterior) Arm <1 day              Physical Exam  Constitutional:       Appearance: Normal appearance  Cardiovascular:      Rate and Rhythm: Regular rhythm  Bradycardia present  Pulses: Normal pulses  Heart sounds: No murmur heard  Pulmonary:      Effort: Pulmonary effort is normal       Breath sounds: Normal breath sounds  Musculoskeletal:      Right lower leg: No edema  Left lower leg: No edema  Skin:     General: Skin is warm and dry  Findings: No rash  Neurological:      Mental Status: He is alert

## 2023-03-16 NOTE — ANESTHESIA PREPROCEDURE EVALUATION
Procedure:  Cardiac pacer implant (Chest)    Relevant Problems   CARDIO   (+) Benign essential HTN   (+) Bifascicular block   (+) Complete heart block (HCC)   (+) Mixed hyperlipidemia      ENDO   (+) Type 2 diabetes mellitus without complication, without long-term current use of insulin (HCC)      /RENAL   (+) Stage 3 chronic kidney disease, unspecified whether stage 3a or 3b CKD (HCC)   Complete heart block (HCC)  Assessment & Plan  • Assessment:  ? Patient presented with 3 weeks of fatigue, improved with exercise  Placed apple watch on wrist on 3/13 and heart rate was noted to be in the 30s  Patient was evaluated by PCP today and EKG was noted to have complete heart block  ? Previous history 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  ? ECHO 10/4: LVEF 55% with mild hypokinesis in the inferior lateral wall  Grade 1 diastolic function, mild mitral valve regurg  ? Minimally symptomatic due to fatigue, but no documented hypotension or mental status change  • Plan  ? Patient is currently asymptomatic, continue cardiopulmonary monitoring  ? Consult placed to Electrophysiology, plan for pacer placement on 3/16- NPO at [address] [place] [address]   ? No current concern for Lyme, F/U TSH  ? If patient becomes symptomatic, low threshold for pacing   ? Follow-up ECHO        Benign essential HTN  Assessment & Plan  • Assessment:  ? History of HTN maintained on Norvasc 5 and Lisinopril 10  ? On arrival to ER /105, patient did no take blood pressure medications this morning  • Plan  ? Attempt Blood Pressure control with 10mg Hydralazine  ? Pending Reassessment of Blood Pressure with plan to restart home medications of Norvasc then lisinopril       Stage 3 chronic kidney disease, unspecified whether stage 3a or 3b CKD (HCC)  Assessment & Plan  • Assessemt: CKD 3  ? Continue to monitor BUN/Cr   - Baseline: 1 42, limited data  - Today:1 26  ? Continue to Monitor I/O  ?  Avoid nephrotoxic Medications         Type 2 diabetes mellitus without complication, without long-term current use of insulin ([facility] [address] )  Assessment & Plan  • Assessment  ? Maintained on Glimepiride 4 daily and metformin 500 BID   ? Most recent A1C 7 0  • Plan  ? Hold Home medications in the setting of NPO status for procedure  ? SSI        Mixed hyperlipidemia  Assessment & Plan  • Assessment:  ? Chronically Maintained on Zocor  ? Most Recent Lipids 6/17/22:  - Total cholesterol 170, triglycerides 130, HDL 48, LDL 99  • Plan  ? Initiate Pravastatin 40 Daily  ? Transition to home Zocor at discharge     ----------------------------------------------------------------------------------------  ECHO Interpretation Summary3/15/2023       •  Left Ventricle: Left ventricular cavity size is normal  Wall thickness is moderately increased  The left ventricular ejection fraction is 60%  Systolic function is normal  There is hypokinesis of the mid to apical inferolateral wall and basal inferior wall              Physical Exam    Airway    Mallampati score: III  TM Distance: >3 FB  Neck ROM: full     Dental       Cardiovascular  Cardiovascular exam normal    Pulmonary  Pulmonary exam normal     Other Findings        Anesthesia Plan  ASA Score- 3     Anesthesia Type- IV sedation with anesthesia with ASA Monitors  Additional Monitors:   Airway Plan:           Plan Factors-    Chart reviewed  EKG reviewed  Imaging results reviewed  Existing labs reviewed  Patient summary reviewed  Patient is not a current smoker  Induction- intravenous  Postoperative Plan-     Informed Consent- Anesthetic plan and risks discussed with patient  I personally reviewed this patient with the CRNA  Discussed and agreed on the Anesthesia Plan with the DELORIS Mosher

## 2023-03-16 NOTE — QUICK NOTE
Patient seen and evaluated by Critical Care today and deemed to be appropriate for transfer to Med Surg with Telemetry   Spoke to Dr Sara Briscoe  from [facility] to accept patient transfer  Critical care can be contacted via [employer] with any questions or concerns

## 2023-03-16 NOTE — DISCHARGE INSTR - AVS FIRST PAGE
Please refer to post device implantation discharge instructions and restrictions below and your device booklet/temporary card  Keep incision dry for one week  Do not use lotions, powders, ointments, or creams on the incision  Leave outer bandage in place for one week  The bandage is water proof  You may shower with it as long as it is fully adhered to your skin  If the bandage appears to be coming off or if there is an open gap in the bandage to the area of your incision, then please keep the whole area dry for the remaining week  After one week, please remove the bandage by gently pulling all edges away from the center and slowly remove it from your skin  Do not quickly rip off the bandage  No overhead reaching, pushing, or pulling with your left arm for 6 weeks  No lifting greater than 10 lbs with your left arm for 6 weeks  No driving for 48 hours  Please call the office if you notice redness, swelling, bleeding, or drainage from incision or if you develop fevers  AFTER PACEMAKER CARE:    If you have any questions, please call [phone] to speak with a nurse (8:30am-4pm, or [phone] after hours)  For appointments, please call [phone]  WHAT YOU SHOULD KNOW:   A pacemaker is a small, battery-powered device that is placed under your skin in your upper chest area with wires placed through a vein that lead directly into the heart  It helps regulate your heart rate and prevent your heart from beating too slowly  AFTER YOU LEAVE:     Medicines:     Pain medicine: You may need medicine to take away or decrease pain  Learn how to take your medicine  Ask what medicine and how much you should take  Be sure you know how, when, and how often to take it  Usually Over the counter pain medicine is sufficient to control pain (Acetominophen or Ibuprofen) Ask your doctor if you may take these   If this does not control your pain, narcotic pain killers may be prescribed, please call if you need prescription  Do not wait until the pain is severe before you take your medicine  Tell caregivers if your pain does not decrease  Pain medicine can make you dizzy or sleepy  Prevent falls by calling someone when you get out of bed or if you need help  Take your medicine as directed  Call your healthcare provider if you think your medicine is not helping or if you have side effects  Tell him if you are allergic to any medicine  Follow up with your cardiologist after your procedure: You will need a follow-up visit approximately 2 weeks after you leave the hospital  Your cardiologist will check your wound and make sure that your pacemaker is working correctly  Follow the instructions to check your pacemaker: Your cardiologist or primary healthcare provider will check your pacemaker and the battery regularly  He will use a computer to check your pacemaker over the telephone or wireless device which will be given to you  Pacemaker batteries usually last 8 to 10 years  The pacemaker unit will be replaced when the battery gets low  This is a simpler procedure than the original one to implant your pacemaker  Wound care:  Keep your incision dry for one week  Do not use lotions/powders/creams on incision  Leave outer bandage in place for 1 week - it is water proof, and as long as it is fully adhered to your skin you may shower with it  If it appears as though the bandage is coming off and/or there is any communication to the area of device incision, please then keep the whole area dry for the remaining week  After 1 week, please remove by pulling all edges away from the center of the bandage  Please call the office if you notice redness, swelling, bleeding, or drainage from incision or if you develop fevers  Activity:   Arm movement and lifting:  Be careful using the arm on the side of your pacemaker   Do not move your arm for the first 24 hours after your procedure  Do not  lift your arm above your shoulder or lift more than 10 pounds for one month after your procedure  Avoid pushing, pulling, or repetitive arm movements for one month  This helps the leads stay in place and helps your wound heal  Ask your caregiver when you can drive after your procedure  You may move your arm side to side without lifting above your shoulder, and do not need to wear a sling at home  Driving: you are ok to drive 48 hours after pacemaker is implanted   Sports:  Ask your caregiver when it is okay to play tennis, golf, basketball, or any sport that requires you to lift your arms  Do not play full contact sports, such as football, that could damage your pacemaker  Ask your cardiologist or primary healthcare provider how much and what kinds of physical activity are safe for you  Living with a pacemaker:   Tell all caregivers you have a pacemaker: This includes surgeons, radiologists, and medical technicians  You may want to wear a medical alert ID bracelet or necklace that states that you have a pacemaker  Carry your pacemaker ID card: Make sure you receive a pacemaker ID card  Carry it with you at all times  It lists important information about your pacemaker  Show it to airport security if you travel  Avoid electrical interference:  Avoid welding equipment and other equipment with large magnets or electric fields  These things could interfere with how your pacemaker works  Use your cell phone on the ear opposite from your pacemaker  Do not carry your cell phone in your shirt pocket over your chest      Some Pacemakers are MRI safe  Ask you doctor if it is safe to proceed with MRI and let the radiologist and staff know you have a pacemaker  Do not touch the skin around your pacemaker: This can cause damage to the lead wires or move the pacemaker unit from where it should be      Contact your cardiologist or primary healthcare provider if:   The area around your pacemaker has increasing amount of pain after surgery  The pain should improve over first few days after implantation  The skin around your stitches has increasing redness, swelling, or has drainage  This may mean that you have an infection  You have a fever  You have chills, a cough, and feel weak or achy  These are also signs of infection  Your feet or ankles are more swollen than your baseline  Your Heart rate is less than 50 beats per minute     Seek care immediately if:   Your bandage becomes soaked with blood  Your pacemaker is swelling rapidly    Your stitches open up  You feel your heart suddenly beating very slowly or quickly  You become too weak or dizzy to stand, or you pass out  Your arm or leg feels warm, tender, and painful  It may look swollen and red  You have chest pain that does not go away with rest or medicine  You feel lightheaded, short of breath, and have chest pain  You cough up blood  © 2014 [address] is for End User's use only and may not be sold, redistributed or otherwise used for commercial purposes  All illustrations and images included in CareNotes® are the copyrighted property of [employer]  or [employer]ad [employer]  The above information is an  only  It is not intended as medical advice for individual conditions or treatments  Talk to your doctor, nurse or pharmacist before following any medical regimen to see if it is safe and effective for you

## 2023-03-16 NOTE — PROGRESS NOTES
[address] [facility]  Progress Note Jasmin Chacon 1940, 81 y o  male MRN: [id number]  Unit/Bed#: [facility] 525-01 Encounter: [id number]  Primary Care Provider: Stacy Herrera DO   Date and time admitted to hospital: 3/15/2023 12:58 PM    Complete heart block ([facility] [address] )  Assessment & Plan  · Assessment:  · Patient presented with 3 weeks of fatigue, improved with exercise  Placed apple watch on wrist on 3/13 and heart rate was noted to be in the 30s  Patient was evaluated by PCP today and EKG was noted to have complete heart block  · Previous history 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  · ECHO 10/4: LVEF 55% with mild hypokinesis in the inferior lateral wall  Grade 1 diastolic function, mild mitral valve regurg  · Minimally symptomatic due to fatigue, but no documented hypotension or mental status change  · Plan  · continue cardiopulmonary monitoring     · plan for pacer placement today  · No current concern for Lyme  · TSH WNL  · ECHO read pending      Benign essential HTN  Assessment & Plan  · Assessment:  · History of HTN maintained on Norvasc 5 and Lisinopril 10  · On arrival to ER /105, patient did no take blood pressure medications this morning  · Plan  · PRN hydralazine until PPM then restart home medications     Stage 3 chronic kidney disease, unspecified whether stage 3a or 3b CKD ([facility] )  Assessment & Plan  • Assessemt: CKD 3  o Continue to monitor BUN/Cr  - Baseline: 1 42, limited data  o Continue to Monitor I/O  o Avoid nephrotoxic Medications       Type 2 diabetes mellitus without complication, without long-term current use of insulin (HCC)  Assessment & Plan  · Assessment  · Maintained on Glimepiride 4 daily and metformin 500 BID   · Most recent A1C 7 0  · Plan  · Hold Home medications in the setting of NPO status for procedure  · SSI      Mixed hyperlipidemia  Assessment & Plan  · Assessment:  · Chronically Maintained on Zocor  · Most Recent Lipids 22:  · Total cholesterol 170, triglycerides 130, HDL 48, LDL 99  · Plan  · Pravastatin 40 Daily  · Transition to home Zocor at discharge        ----------------------------------------------------------------------------------------  HPI/24hr events: hemodynamically stable overnight     Patient appropriate for transfer out of the ICU today?: No  Disposition: Continue Stepdown Level 1 level of care   Code Status: Level 1 - Full Code  ---------------------------------------------------------------------------------------  SUBJECTIVE  Poor sleep last night, wants his son to be updated regarding the time of the procedure     Review of Systems  Review of systems was reviewed and negative unless stated above in HPI/24-hour events   ---------------------------------------------------------------------------------------  OBJECTIVE    Vitals   Vitals:    03/15/23 1821 03/15/23 1901 03/15/23 2330 23 0232   BP: (!) 224/86 163/74 (!) 174/77 (!) 206/87   BP Location:  Right arm     Pulse: (!) 42 (!) 44 (!) 40 (!) 39   Resp: (!) 24      Temp:  97 7 °F (36 5 °C)     TempSrc:  Oral     SpO2: 99% 98%     Weight:  78 4 kg (172 lb 13 5 oz)     Height:  5' 9" (1 753 m)       Temp (24hrs), Av 6 °F (36 4 °C), Min:97 5 °F (36 4 °C), Max:97 7 °F (36 5 °C)  Current: Temperature: 97 7 °F (36 5 °C)          Respiratory:  SpO2: SpO2: 98 %       Invasive/non-invasive ventilation settings   Respiratory    Lab Data (Last 4 hours)    None         O2/Vent Data (Last 4 hours)    None                Physical Exam  Constitutional:       General: He is not in acute distress  Appearance: He is not ill-appearing  HENT:      Head: Normocephalic  Mouth/Throat:      Mouth: Mucous membranes are moist    Eyes:      Pupils: Pupils are equal, round, and reactive to light  Cardiovascular:      Rate and Rhythm: Regular rhythm  Bradycardia present  Pulmonary:      Effort: Pulmonary effort is normal  No respiratory distress  Abdominal:      General: Abdomen is flat  Skin:     General: Skin is warm and dry  Neurological:      General: No focal deficit present  Mental Status: He is alert  Laboratory and Diagnostics:  Results from last 7 days   Lab Units 03/16/23  0228 03/15/23  1311   WBC Thousand/uL 9 78 10 08   HEMOGLOBIN g/dL 14 0 15 0   HEMATOCRIT % 42 7 44 9   PLATELETS Thousands/uL 223 250   NEUTROS PCT % 68 80*   MONOS PCT % 10 7     Results from last 7 days   Lab Units 03/16/23  0228 03/15/23  1311   SODIUM mmol/L 139 137   POTASSIUM mmol/L 3 7 4 1   CHLORIDE mmol/L 107 108   CO2 mmol/L 25 24   ANION GAP mmol/L 7 5   BUN mg/dL 23 20   CREATININE mg/dL 1 22 1 26   CALCIUM mg/dL 9 1 9 4   GLUCOSE RANDOM mg/dL 136 250*   ALT U/L 32 39   AST U/L 14 17   ALK PHOS U/L 69 81   ALBUMIN g/dL 3 5 3 8   TOTAL BILIRUBIN mg/dL 0 64 0 60     Results from last 7 days   Lab Units 03/16/23  0228   MAGNESIUM mg/dL 2 1   PHOSPHORUS mg/dL 2 6                   ABG:    VBG:          Micro        EKG: no new  Imaging: no new    Intake and Output  I/O       03/14 0701  03/15 0700 03/15 0701  03/16 0700    Urine (mL/kg/hr)  225    Total Output  225    Net  -225                Height and Weights   Height: 5' 9" (175 3 cm)  IBW (Ideal Body Weight): 70 7 kg  Body mass index is 25 52 kg/m²  Weight (last 2 days)     Date/Time Weight    03/15/23 1901 78 4 (172 84)    03/15/23 1710 78 5 (173)            Nutrition       Diet Orders   (From admission, onward)             Start     Ordered    03/16/23 0001  Diet NPO  Diet effective midnight        References:    Nutrtion Support Algorithm Enteral vs  Parenteral   Question Answer Comment   Diet Type NPO    RD to adjust diet per protocol?  Yes        03/15/23 1524                  Active Medications  Scheduled Meds:  Current Facility-Administered Medications   Medication Dose Route Frequency Provider Last Rate   • acetaminophen  975 mg Oral Q6H PRN Loida Kahn PA-C     • cefazolin  1,000 mg Intravenous Once Isis Rios PA-C     • chlorhexidine  15 mL Mouth/Throat Q12H [facility] John Mojica PA-C     • heparin (porcine)  5,000 Units Subcutaneous [facility] John castelan PA-C     • insulin lispro  1-5 Units Subcutaneous Q6H [facility] Jhon Mojica PA-C     • polyethylene glycol  17 g Oral Daily PRN Kimo Key PA-C     • pravastatin  40 mg Oral Daily With Elizabeth Glover PA-C     • senna-docusate sodium  2 tablet Oral BID Kimo Key PA-C       Continuous Infusions:     PRN Meds:   acetaminophen, 975 mg, Q6H PRN  polyethylene glycol, 17 g, Daily PRN        Invasive Devices Review  Invasive Devices     Peripheral Intravenous Line  Duration           Peripheral IV 03/15/23 Distal;Left;Upper;Ventral (anterior) Arm <1 day                Rationale for remaining devices: n/a  ---------------------------------------------------------------------------------------  Advance Directive and Living Will:      Power of :    POLST:    ---------------------------------------------------------------------------------------  Care Time Delivered:   No Critical Care time spent       [facility]      Portions of the record may have been created with voice recognition software  Occasional wrong word or "sound a like" substitutions may have occurred due to the inherent limitations of voice recognition software    Read the chart carefully and recognize, using context, where substitutions have occurred

## 2023-03-16 NOTE — ASSESSMENT & PLAN NOTE
· Assessment:  · History of HTN maintained on Norvasc 5 and Lisinopril 10  · On arrival to ER /105, patient did no take blood pressure medications this morning  · Plan  · PRN hydralazine until PPM then restart home medications

## 2023-03-16 NOTE — UTILIZATION REVIEW
Initial Clinical Review    Admission: Date/Time/Statement:   Admission Orders (From admission, onward)     Ordered        03/15/23 1416  1 Medical [facility],5[address] Floor [place]  Once                      Orders Placed This Encounter   Procedures   • INPATIENT ADMISSION     Standing Status:   Standing     Number of Occurrences:   1     Order Specific Question:   Level of Care     Answer:   Level 1 Stepdown [13]     Order Specific Question:   Estimated length of stay     Answer:   More than 2 Midnights     Order Specific Question:   Certification     Answer:   I certify that inpatient services are medically necessary for this patient for a duration of greater than two midnights  See H&P and MD Progress Notes for additional information about the patient's course of treatment  ED Arrival Information     Expected   -    Arrival   3/15/2023 12:48    Acuity   Emergent            Means of arrival   Walk-In    Escorted by   Family Member    Service   Hospitalist    Admission type   Emergency            Arrival complaint   sent by doctor, medical problem           Chief Complaint   Patient presents with   • Slow Heart Rate     Pt sent in for pacemaker placement from PCP in [place], pt denies CP or SOB or dizziness       Initial Presentation: 81 y o  male with PMH of HTN, HLD, DM2, CKD 3, chronic RBBB presented to the ED from PCP's office d/t EKG revealed CHB  Pt was at his PCP's office today d/t fatigue and low HR noted in his apple watch  Reports ongoing fatigue x 3 weeks  PCP instructed pt to go to the ED for EP eval   In the ED, HR 43, /105  He did not take his BP meds this am  Telemetry in ED demonstrating sinus arrhythmia with atrial rate 75-90 bpm, complete AV block and a likely junctional escape rhythm at 40 bpm  On exam, GCS 15, aaox3, bradycardia but regular, abd soft, nd, nt  Hydralazine IV  Admitted as Inpatient for Complete Heart Block  Plan: continue cardiopulmonary monitoring  EP consult  NPO at [place]  F/u echo  03/15 EP Consult: symptomatic complete heart block:  Plan for pacemaker implantation tentatively tomorrow  Keep NPO after midnight  If symptomatic at rest with dizziness, hypotension, or shortness of breath, can try Atropine IVP 1mg -> transcutaneous pacing  Date: 03/16 Day 2:   Critical Care Notes: Pt reports that he had a poor sleep last night  On exam, alert, no focal deficit, bradycardia present  TSH wnl  continue cardiopulmonary monitoring  plan for pacer placement today  ECHO read pending  PRN hydralazine until PPM then restart home medications  Cont to mon I/O  SSI  Hold home DM meds  Pravastain daily  EP Notes: Telemetry overnight, remains in complete heart block with junctional escape rhythm  However, relatively asymptomatic at rest  Echocardiogram demonstrating preserved LV function  Plan for dual chamber PPM placement today       ED Triage Vitals   Temperature Pulse Respirations Blood Pressure SpO2   03/15/23 1252 03/15/23 1252 03/15/23 1252 03/15/23 1252 03/15/23 1252   97 5 °F (36 4 °C) (!) 43 18 (!) 227/105 99 %      Temp Source Heart Rate Source Patient Position - Orthostatic VS BP Location FiO2 (%)   03/15/23 1252 03/15/23 1252 03/15/23 1252 03/15/23 1252 --   Oral Monitor Sitting Right arm       Pain Score       03/15/23 1901       No Pain          Wt Readings from Last 1 Encounters:   03/15/23 78 4 kg (172 lb 13 5 oz)     Additional Vital Signs:   Date/Time Temp Pulse Resp BP MAP (mmHg) SpO2 O2 Device Patient Position - Orthostatic VS   03/16/23 1441 -- 69 -- 171/77 Abnormal  110 -- -- --   03/16/23 1410 -- 77 -- 196/87 Abnormal  125 -- -- --   03/16/23 0800 -- -- -- -- -- 98 % None (Room air) --   03/16/23 06:08:52 98 9 °F (37 2 °C) 40 Abnormal  -- 167/74 107 97 % -- --   03/16/23 0400 -- -- -- -- -- -- None (Room air) --   03/16/23 0355 -- 41 Abnormal  -- 178/75 Abnormal  108 98 % -- --   03/16/23 0232 -- 39 Abnormal  -- 206/87 Abnormal  125 -- -- --   03/15/23 2331 -- -- -- -- -- -- None (Room air) --   03/15/23 2330 -- 40 Abnormal  -- 174/77 Abnormal  110 -- -- --   03/15/23 1901 97 7 °F (36 5 °C) 44 Abnormal  -- 163/74 106 98 % None (Room air) Sitting   03/15/23 1821 -- 42 Abnormal  24 Abnormal  224/86 Abnormal  124 99 % -- --   03/15/23 1710 -- 40 Abnormal  -- 210/78 Abnormal  -- -- -- --   03/15/23 1625 -- 39 Abnormal  19 210/78 Abnormal  112 98 % -- --   03/15/23 1545 -- 37 Abnormal  24 Abnormal  222/93 Abnormal  133 96 % -- --   03/15/23 1530 -- 37 Abnormal  19 222/88 Abnormal  127 95 % None (Room air) --   03/15/23 1410 -- 37 Abnormal  18 222/89 Abnormal  -- 99 % -- --       Pertinent Labs/Diagnostic Test Results:   XR chest 1 view portable   ED Interpretation by Virgie Castellano DO (03/15 1401)   No acute cardiopulmonary pathology  Pacer pads in place  Final Result by Jenniffer Peña MD (03/16 3985)      No active pulmonary disease  Workstation performed: [id number]         XR chest portable    (Results Pending)   XR chest 2 views    (Results Pending)     03/15  EKG result:Sinus rhythm with complete heart block with ventricular escape complexes  Moderate voltage criteria for LVH, may be normal variant  T wave abnormality, consider inferolateral ischemia    ECHO: Left Ventricle: Left ventricular cavity size is normal  Wall thickness is moderately increased  The left ventricular ejection fraction is 60%  Systolic function is normal  There is hypokinesis of the mid to apical inferolateral wall and basal inferior wall     03/16 Cardiac pacer implant:  Procedure:  Dual chamber PPM  RA lead   RV lead - His lead - with LPF capture    Anesthesia- local lidocaine  Summary of the procedure: The patient came in to the laboratory for dual -chamber device placement   The device has been placed and patient tolerated the procedure well        Results from last 7 days   Lab Units 03/16/23  0228 03/15/23  1311   WBC Thousand/uL 9 78 10 08   HEMOGLOBIN g/dL 14 0 15 0   HEMATOCRIT % 42 7 44 9   PLATELETS Thousands/uL 223 250   NEUTROS ABS Thousands/µL 6 58 8 05*         Results from last 7 days   Lab Units 03/16/23  0228 03/15/23  1311   SODIUM mmol/L 139 137   POTASSIUM mmol/L 3 7 4 1   CHLORIDE mmol/L 107 108   CO2 mmol/L 25 24   ANION GAP mmol/L 7 5   BUN mg/dL 23 20   CREATININE mg/dL 1 22 1 26   EGFR ml/min/1 73sq m 55 53   CALCIUM mg/dL 9 1 9 4   CALCIUM, IONIZED mmol/L 1 18  --    MAGNESIUM mg/dL 2 1  --    PHOSPHORUS mg/dL 2 6  --      Results from last 7 days   Lab Units 03/16/23  0228 03/15/23  1311   AST U/L 14 17   ALT U/L 32 39   ALK PHOS U/L 69 81   TOTAL PROTEIN g/dL 7 0 7 8   ALBUMIN g/dL 3 5 3 8   TOTAL BILIRUBIN mg/dL 0 64 0 60     Results from last 7 days   Lab Units 03/16/23  0605 03/15/23  2327 03/15/23  1822   POC GLUCOSE mg/dl 176* 126 237*     Results from last 7 days   Lab Units 03/16/23  0228 03/15/23  1311   GLUCOSE RANDOM mg/dL 136 250*       Results from last 7 days   Lab Units 03/15/23  1824 03/15/23  1633 03/15/23  1311   HS TNI 0HR ng/L  --   --  16   HS TNI 2HR ng/L  --  25  --    HSTNI D2 ng/L  --  9  --    HS TNI 4HR ng/L 25  --   --    HSTNI D4 ng/L 9  --   --              Results from last 7 days   Lab Units 03/16/23  0228   TSH 3RD GENERATON uIU/mL 1 990     ED Treatment:   Medication Administration from 03/15/2023 1248 to 03/15/2023 1901       Date/Time Order Dose Route Action     03/15/2023 1546 EDT heparin (porcine) subcutaneous injection 5,000 Units 5,000 Units Subcutaneous Given     03/15/2023 1713 EDT pravastatin (PRAVACHOL) tablet 40 mg 40 mg Oral Given     03/15/2023 1824 EDT insulin lispro (HumaLOG) 100 units/mL subcutaneous injection 1-5 Units 2 Units Subcutaneous Given     03/15/2023 1547 EDT hydrALAZINE (APRESOLINE) injection 10 mg 10 mg Intravenous Given     03/15/2023 1836 EDT hydrALAZINE (APRESOLINE) injection 20 mg 20 mg Intravenous Given        Past Medical History:   Diagnosis Date   • Diabetes mellitus ([facility] [address] )      Present on Admission:  • Benign essential HTN  • Mixed hyperlipidemia  • Stage 3 chronic kidney disease, unspecified whether stage 3a or 3b CKD (HCC)  • Type 2 diabetes mellitus without complication, without long-term current use of insulin (HCC)      Admitting Diagnosis: Complete heart block (HCC) [I44 2]  Slow heart rate [R00 1]  Age/Sex: 81 y o  male  Admission Orders:  SCD  Neuro checks  Nsg dysphagia assessment  Daily weights  I/O  Telemetry monitoring    Scheduled Medications:  amLODIPine, 5 mg, Oral, Daily  chlorhexidine, 15 mL, Mouth/Throat, Q12H VANNA  heparin (porcine), 5,000 Units, Subcutaneous, Q8H [address]  insulin lispro, 1-5 Units, Subcutaneous, Q6H VANNA  lisinopril, 10 mg, Oral, Daily  pravastatin, 40 mg, Oral, Daily With Dinner  senna-docusate sodium, 2 tablet, Oral, BID  hydrALAZINE (APRESOLINE) injection 20 mg IV once 03/16    Continuous IV Infusions: none     PRN Meds:  acetaminophen, 975 mg, Oral, Q6H PRN  labetalol, 10 mg, Intravenous, Q6H PRN  03/16 x 1  polyethylene glycol, 17 g, Oral, Daily PRN    IP CONSULT TO ELECTROPHYSIOLOGY  IP CONSULT TO CASE MANAGEMENT    Network Utilization Review Department  ATTENTION: Please call with any questions or concerns to [phone] and carefully listen to the prompts so that you are directed to the right person  All voicemails are confidential   Kim Grand all requests for admission clinical reviews, approved or denied determinations and any other requests to dedicated fax number below belonging to the campus where the patient is receiving treatment   List of dedicated fax numbers for the Facilities:  [address] DENIALS (Administrative/Medical Necessity) [phone]   [address] [place] [address] (Maternity/NICU/Pediatrics) [phone]   [address] [phone]   [facility] [phone]   [facility] [phone]   [address] [facility] [phone]   [address] [place] [address] [id number] [place] [address] [address] [facility] [place] [address] [phone]

## 2023-03-16 NOTE — ASSESSMENT & PLAN NOTE
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

## 2023-03-17 ENCOUNTER — APPOINTMENT (INPATIENT)
Dept: RADIOLOGY | Facility: HOSPITAL | Age: 83
End: 2023-03-17

## 2023-03-17 VITALS
DIASTOLIC BLOOD PRESSURE: 67 MMHG | OXYGEN SATURATION: 98 % | RESPIRATION RATE: 17 BRPM | BODY MASS INDEX: 25.14 KG/M2 | HEIGHT: 69 IN | WEIGHT: 169.75 LBS | SYSTOLIC BLOOD PRESSURE: 148 MMHG | TEMPERATURE: 97.9 F | HEART RATE: 68 BPM

## 2023-03-17 LAB
ANION GAP SERPL CALCULATED.3IONS-SCNC: 6 MMOL/L (ref 4–13)
BASOPHILS # BLD AUTO: 0.02 THOUSANDS/ÂΜL (ref 0–0.1)
BASOPHILS NFR BLD AUTO: 0 % (ref 0–1)
BUN SERPL-MCNC: 22 MG/DL (ref 5–25)
CA-I BLD-SCNC: 1.17 MMOL/L (ref 1.12–1.32)
CALCIUM SERPL-MCNC: 9 MG/DL (ref 8.3–10.1)
CHLORIDE SERPL-SCNC: 110 MMOL/L (ref 96–108)
CO2 SERPL-SCNC: 23 MMOL/L (ref 21–32)
CREAT SERPL-MCNC: 1.15 MG/DL (ref 0.6–1.3)
EOSINOPHIL # BLD AUTO: 0.06 THOUSAND/ÂΜL (ref 0–0.61)
EOSINOPHIL NFR BLD AUTO: 1 % (ref 0–6)
ERYTHROCYTE [DISTWIDTH] IN BLOOD BY AUTOMATED COUNT: 11.5 % (ref 11.6–15.1)
GFR SERPL CREATININE-BSD FRML MDRD: 59 ML/MIN/1.73SQ M
GLUCOSE SERPL-MCNC: 116 MG/DL (ref 65–140)
GLUCOSE SERPL-MCNC: 119 MG/DL (ref 65–140)
GLUCOSE SERPL-MCNC: 352 MG/DL (ref 65–140)
HCT VFR BLD AUTO: 44.1 % (ref 36.5–49.3)
HGB BLD-MCNC: 14 G/DL (ref 12–17)
IMM GRANULOCYTES # BLD AUTO: 0.04 THOUSAND/UL (ref 0–0.2)
IMM GRANULOCYTES NFR BLD AUTO: 0 % (ref 0–2)
LYMPHOCYTES # BLD AUTO: 1.44 THOUSANDS/ÂΜL (ref 0.6–4.47)
LYMPHOCYTES NFR BLD AUTO: 14 % (ref 14–44)
MAGNESIUM SERPL-MCNC: 2.2 MG/DL (ref 1.6–2.6)
MCH RBC QN AUTO: 32.6 PG (ref 26.8–34.3)
MCHC RBC AUTO-ENTMCNC: 31.7 G/DL (ref 31.4–37.4)
MCV RBC AUTO: 103 FL (ref 82–98)
MONOCYTES # BLD AUTO: 0.72 THOUSAND/ÂΜL (ref 0.17–1.22)
MONOCYTES NFR BLD AUTO: 7 % (ref 4–12)
NEUTROPHILS # BLD AUTO: 7.86 THOUSANDS/ÂΜL (ref 1.85–7.62)
NEUTS SEG NFR BLD AUTO: 78 % (ref 43–75)
NRBC BLD AUTO-RTO: 0 /100 WBCS
PHOSPHATE SERPL-MCNC: 3.3 MG/DL (ref 2.3–4.1)
PLATELET # BLD AUTO: 219 THOUSANDS/UL (ref 149–390)
PMV BLD AUTO: 11.1 FL (ref 8.9–12.7)
POTASSIUM SERPL-SCNC: 3.7 MMOL/L (ref 3.5–5.3)
RBC # BLD AUTO: 4.3 MILLION/UL (ref 3.88–5.62)
SODIUM SERPL-SCNC: 139 MMOL/L (ref 135–147)
WBC # BLD AUTO: 10.14 THOUSAND/UL (ref 4.31–10.16)

## 2023-03-17 RX ADMIN — LABETALOL HYDROCHLORIDE 10 MG: 5 INJECTION, SOLUTION INTRAVENOUS at 03:46

## 2023-03-17 RX ADMIN — INSULIN LISPRO 3 UNITS: 100 INJECTION, SOLUTION INTRAVENOUS; SUBCUTANEOUS at 12:25

## 2023-03-17 RX ADMIN — AMLODIPINE BESYLATE 5 MG: 5 TABLET ORAL at 09:17

## 2023-03-17 RX ADMIN — HEPARIN SODIUM 5000 UNITS: 5000 INJECTION INTRAVENOUS; SUBCUTANEOUS at 13:25

## 2023-03-17 RX ADMIN — HEPARIN SODIUM 5000 UNITS: 5000 INJECTION INTRAVENOUS; SUBCUTANEOUS at 05:20

## 2023-03-17 RX ADMIN — LISINOPRIL 10 MG: 10 TABLET ORAL at 09:17

## 2023-03-17 NOTE — ASSESSMENT & PLAN NOTE
Lab Results   Component Value Date    HGBA1C 7 0 (A) 01/03/2023       Recent Labs     03/16/23  1817 03/16/23 2053 03/17/23  0531 03/17/23  1223   POCGLU 318* 230* 119 352*       Blood Sugar Average: Last 72 hrs:  (P) 222 [id number]     Continue glimepiride, metformin as an outpatient follow-up

## 2023-03-17 NOTE — DISCHARGE SUMMARY
[facility]  Discharge- Nelli Velez 1940, 81 y o  male MRN: [id number]  [facility]counter: [id number]  Primary Care Provider: Maxim Adamson DO   Date and time admitted to hospital: 3/15/2023 12:58 PM    * Complete heart block ([address] )  Assessment & Plan  S/p PPM placement by cardiology  Cleared for discharge with outpatient follow-up  Cardiology notes noted    Benign essential HTN  Assessment & Plan  Monitor blood pressures  Avoid hypotension    Stage 3 chronic kidney disease, unspecified whether stage 3a or 3b CKD [facility])  Assessment & Plan  Lab Results   Component Value Date    EGFR 59 03/17/2023    EGFR 55 03/16/2023    EGFR 53 03/15/2023    CREATININE 1 15 03/17/2023    CREATININE 1 22 03/16/2023    CREATININE 1 26 03/15/2023     Monitor kidney function  Avoid nephrotoxins next outpatient follow-up    Type 2 diabetes mellitus without complication, without long-term current use of insulin [facility])  Assessment & Plan  Lab Results   Component Value Date    HGBA1C 7 0 (A) 01/03/2023       Recent Labs     03/16/23  1817 03/16/23  2053 03/17/23  0531 03/17/23  1223   POCGLU 318* 230* 119 352*       Blood Sugar Average: Last 72 hrs:  (P) 222 [id number]     Continue glimepiride, metformin as an outpatient follow-up    Mixed hyperlipidemia  Assessment & Plan  Continue statin          Discharge Summary - [facility] Internal Medicine    Patient Information: Nelli Velez 81 y o  male MRN: [id number]  [facility] Encounter: [id number]    Discharging Physician / Practitioner: Nia Estrella MD  PCP: Maxim Adamson DO  Admission Date: 3/15/2023  Discharge Date: 03/17/23    Disposition:     Home    Reason for Admission: Complete heart block symptomatic    Discharge Diagnoses:     Principal Problem:    Complete heart block ([address] )  Active Problems:    Mixed hyperlipidemia    Type 2 diabetes mellitus without complication, without long-term current use of insulin ([facility] )    Stage 3 chronic kidney disease, unspecified whether stage 3a or 3b CKD ([facility] )    Benign essential HTN  Resolved Problems:    * No resolved hospital problems  *      Consultations During Hospital Stay:  · Cardiology    Procedures Performed:     Procedure:  Dual chamber PPM  RA lead   RV lead - His lead - with LPF capture         Hospital Course:     Seble Byrne is a 81 y o  male patient who originally presented to the hospital on 3/15/2023 due to symptomatic complete heart block  Underwent PPM placement by cardiology  He has been cleared for discharge today  His chronic conditions remained stable no medication changes at discharge  Kindly review the chart for details  Condition at Discharge: fair     Discharge Day Visit / Exam:     Subjective:      Vertebrae sitting up in chair  Reports feeling better  Agreeable to discharge plan  History chart labs medications reviewed      Vitals: Blood Pressure: 148/67 (03/17/23 0917)  Pulse: 68 (03/17/23 0917)  Temperature: 97 9 °F (36 6 °C) (03/17/23 1140)  Temp Source: Oral (03/17/23 0337)  Respirations: 17 (03/17/23 1140)  Height: 5' 9" (175 3 cm) (03/15/23 1901)  Weight - Scale: 77 kg (169 lb 12 1 oz) (03/17/23 0531)  SpO2: 98 % (03/17/23 0337)  Exam:   Physical Exam    Comfortably sitting in chair  Neck supple  Lungs diminished breath sounds at the bases  No additional sounds  Heart sounds S1-S2 noted  Abdomen soft nontender  Awake alert obey simple commands  No pedal edema  No rash    Discharge instructions/Information to patient and family:   See after visit summary for information provided to patient and family  Discharge plan discussed with cardiology  Discharge plan discussed with patient, updated son Janet Nina in detail questions answered  Outpatient follow-up with cardiology    Provisions for Follow-Up Care:  See after visit summary for information related to follow-up care and any pertinent home health orders        Planned Readmission: no     Discharge Statement:  I spent 45 minutes discharging the patient  This time was spent on the day of discharge  I had direct contact with the patient on the day of discharge  Greater than 50% of the total time was spent examining patient, answering all patient questions, arranging and discussing plan of care with patient as well as directly providing post-discharge instructions  Additional time then spent on discharge activities  Discharge Medications:  See after visit summary for reconciled discharge medications provided to patient and family        ** Please Note: This note has been constructed using a voice recognition system **

## 2023-03-17 NOTE — CASE MANAGEMENT
Case Management Assessment & Discharge Planning Note    Patient name Marya Bill  Location [address] [place] [address] 525/PPHP [id number] MRN [id number]  : 1940 Date 3/17/2023       Current Admission Date: 3/15/2023  Current Admission Diagnosis:Complete heart block [facility])   Patient Active Problem List    Diagnosis Date Noted   • Complete heart block ([facility] [address] [facility] ) 03/15/2023   • Abnormal ECG 2022   • Bifascicular block 2022   • Stage 3 chronic kidney disease, unspecified whether stage 3a or 3b CKD ([facility] ) 2022   • Benign essential HTN 2022   • BMI 26 0-26 9,adult 06/15/2022   • Mixed hyperlipidemia 2021   • Type 2 diabetes mellitus without complication, without long-term current use of insulin ([facility] ) 2021   • Nasal sinus congestion 2021      LOS (days): 2  Geometric Mean LOS (GMLOS) (days): 1 80  Days to GMLOS:-0 1     OBJECTIVE:    Risk of Unplanned Readmission Score: 9 44         Current admission status: Inpatient  Referral Reason: Other (DC planning)    Preferred Pharmacy:   [address] [place] [address]  Phone: [phone] Fax: [phone]    Primary Care Provider: Brian Turner DO    Primary Insurance: [facility]  Secondary Insurance:     ASSESSMENT:  [address] [place] [address] - Pending sale to [facility]   Primary Phone: [phone] (Mobile)               Advance Directives  Does patient have a [address]?: Yes  Does patient have Advance Directives?: Yes  Advance Directives: Power of  for health care  Primary Contact: Ronni Gimenez         Readmission Root Cause  30 Day Readmission: No    Patient Information  Admitted from[de-identified] Home  Mental Status: Alert  During Assessment patient was accompanied by: Not accompanied during assessment  Assessment information provided by[de-identified] Patient  Primary Caregiver: Self  Support Systems: Self, [address] of Residence: [address] [place] [address] do you PCP: Milton Reynoso MD    Last appt: 1/17/2022  No future appointments. Requested Prescriptions     Pending Prescriptions Disp Refills    gabapentin (NEURONTIN) 300 mg capsule 120 Capsule 0     Sig: Take 1 capsule in the morning, 1 capsule in the afternoon, and 2 capsules at bedtime.        Prior labs and Blood pressures:  BP Readings from Last 3 Encounters:   10/05/20 (!) 178/100   12/03/19 128/80   06/17/19 (!) 132/92     Lab Results   Component Value Date/Time    Sodium 135 (L) 02/09/2022 12:45 PM    Potassium 4.6 02/09/2022 12:45 PM    Chloride 107 02/09/2022 12:45 PM    CO2 19 (L) 02/09/2022 12:45 PM    Anion gap 9 02/09/2022 12:45 PM    Glucose 79 02/09/2022 12:45 PM    BUN 13 02/09/2022 12:45 PM    Creatinine 0.63 02/09/2022 12:45 PM    BUN/Creatinine ratio 21 (H) 02/09/2022 12:45 PM    GFR est AA >60 02/09/2022 12:45 PM    GFR est non-AA >60 02/09/2022 12:45 PM    Calcium 9.2 02/09/2022 12:45 PM     Lab Results   Component Value Date/Time    Hemoglobin A1c 5.3 02/09/2022 12:45 PM     Lab Results   Component Value Date/Time    Cholesterol, total 249 (H) 02/09/2022 12:45 PM    HDL Cholesterol 65 02/09/2022 12:45 PM    LDL, calculated 144.6 (H) 02/09/2022 12:45 PM    VLDL, calculated 39.4 02/09/2022 12:45 PM    Triglyceride 197 (H) 02/09/2022 12:45 PM    CHOL/HDL Ratio 3.8 02/09/2022 12:45 PM     Lab Results   Component Value Date/Time    Vitamin D 25-Hydroxy 27.2 (L) 02/09/2022 12:45 PM       Lab Results   Component Value Date/Time    TSH 0.78 02/09/2022 12:45 PM live in?: [address] entry access options   Select all that apply : No steps to enter home  Type of Current Residence: 2 story home  Upon entering residence, is there a bedroom on the main floor (no further steps)?: Yes  Upon entering residence, is there a bathroom on the main floor (no further steps)?: Yes  In the last 12 months, was there a time when you were not able to pay the mortgage or rent on time?: No  In the last 12 months, how many places have you lived?: 1  In the last 12 months, was there a time when you did not have a steady place to sleep or slept in a shelter (including now)?: No  Homeless/housing insecurity resource given?: N/A  Living Arrangements: Lives w/ Son  Is patient a ?: No    Activities of Daily Living Prior to Admission  Functional Status: Independent  Completes ADLs independently?: Yes  Ambulates independently?: Yes  Does patient use assisted devices?: No  Does patient currently own DME?: No  Does patient have a history of Outpatient Therapy (PT/OT)?: No  Does the patient have a history of Short-Term Rehab?: No  Does patient have a history of HHC?: Yes (pt states he does not remember)  Does patient currently have [address]?: Yes    Current Home Health Care  Type of Current Home Care Services: Nurse visit  [address][de-identified] Other (please enter name in comment) (pt does not remember the name of the agency)  [facility] Provider[de-identified] PCP    Patient Information Continued  Income Source: Pension/FPC  Does patient have prescription coverage?: Yes  Within the past 12 months, you worried that your food would run out before you got the money to buy more : Never true  Within the past 12 months, the food you bought just didn't last and you didn't have money to get more : Never true  Food insecurity resource given?: N/A  Does patient receive dialysis treatments?: No  Does patient have a history of substance abuse?: No  Does patient have a history of Mental Health Diagnosis?: No         Means of Transportation  Means of Transport to Appts[de-identified] Drives Self  In the past 12 months, has lack of transportation kept you from medical appointments or from getting medications?: No  In the past 12 months, has lack of transportation kept you from meetings, work, or from getting things needed for daily living?: No  Was application for public transport provided?: N/A        DISCHARGE DETAILS:    Discharge planning discussed with[de-identified] Patient  Freedom of Choice: Yes     CM contacted family/caregiver?: No- see comments (pt states his son Marcella Kirk is coming in and a call is not needed at this time , he can update his son)  Were Treatment Team discharge recommendations reviewed with patient/caregiver?: Yes  Did patient/caregiver verbalize understanding of patient care needs?: Yes  Were patient/caregiver advised of the risks associated with not following Treatment Team discharge recommendations?: Yes         [facility]         Is the patient interested in [place] [address] at discharge?: No    DME Referral Provided  Referral made for DME?: No    Other Referral/Resources/Interventions Provided:  Interventions: HHA  Referral Comments: Pt states he has a nurse come to his home but does not recall the name of the agency  Treatment Team Recommendation: Home  Discharge Destination Plan[de-identified] Home  Transport at Discharge : Family                                      Additional Comments: Met with pt as CM was consulted for DC planning  Pt states his son Marcella Kirk lives with him and can provide help as needed  pt states he was IPTA, walks approx 1-2 miles a day and performs ADL's and ambulation independently  pt drives and is retired  Pt denies any food or prescription drug insecurity  When asked if pt has  current VNA, pt states " a lady comes to my house and listens to my heart"  Pt could not recall the name of the agency   Called pt's PCP Dr Ru Castro and had to leave a Voicemail message and request a CB to verify of pt has VNA services  Per pt his son Kathy Kimbrough is on his way up to the hospital and take him home if DC today  Pt denies any concerns regarding going home today

## 2023-03-17 NOTE — ASSESSMENT & PLAN NOTE
Lab Results   Component Value Date    EGFR 59 03/17/2023    EGFR 55 03/16/2023    EGFR 53 03/15/2023    CREATININE 1 15 03/17/2023    CREATININE 1 22 03/16/2023    CREATININE 1 26 03/15/2023     Monitor kidney function  Avoid nephrotoxins next outpatient follow-up

## 2023-03-17 NOTE — PROGRESS NOTES
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     Hypertension with hypertensive heart disease     Hyperlipidemia      DM2     CKD Stage 1   81year old male presented with symptoms of exertional fatigue and found to be in complete heart block on outpatient ECG  Not on AV blockers, TSH/electrolytes within normal limits, renal function stable  Admitted for EP evaluation, and underwent pacemaker implantation 3/16  Telemetry reviewed overnight with appropriate A sensed-V paced rhythm in 70 bpm range  Device interrogation showing all pacing parameters within acceptable range  CXR showing RA/RV leads in appropriate position, no pneumothorax  Echocardiogram demonstrating preserved LV function  Regional wall motion abnormalities present inferiorly, which on further review are also present on prior study from last year  No Q waves present on ECG  Stress testing previously ordered, would recommend outpatient testing with exercise nuclear imaging  Plan:  Stable for discharge home, continue current medication regimen and will follow up at [address] [place] [address] office for device check in 2 weeks  Subjective/Objective     Subjective: Resting comfortably in chair with no complaints  Ambulating without any fatigue, shortness of breath      Objective:     Vitals: /67   Pulse 68   Temp 97 9 °F (36 6 °C)   Resp 17   Ht 5' 9" (1 753 m)   Wt 77 kg (169 lb 12 1 oz)   SpO2 98%   BMI 25 07 kg/m²   Vitals:    03/15/23 1901 03/17/23 0531   Weight: 78 4 kg (172 lb 13 5 oz) 77 kg (169 lb 12 1 oz)     Orthostatic Blood Pressures    Flowsheet Row Most Recent Value   Blood Pressure 148/67 filed at 03/17/2023 9160   Patient Position - Orthostatic VS Sitting filed at 03/15/2023 1901            Intake/Output Summary (Last 24 hours) at 3/17/2023 1239  Last data filed at 3/17/2023 1055  Gross per 24 hour   Intake 418 ml   Output 125 ml   Net 293 ml       Invasive Devices     Peripheral Intravenous Line  Duration           Peripheral IV 03/15/23 Distal;Left;Upper;Ventral (anterior) Arm 1 day              Physical Exam  Constitutional:       Appearance: Normal appearance  Cardiovascular:      Rate and Rhythm: Regular rhythm  Bradycardia present  Pulses: Normal pulses  Heart sounds: No murmur heard  Pulmonary:      Effort: Pulmonary effort is normal       Breath sounds: Normal breath sounds  Musculoskeletal:      Right lower leg: No edema  Left lower leg: No edema  Skin:     General: Skin is warm and dry  Findings: No rash  Neurological:      Mental Status: He is alert

## 2023-03-17 NOTE — RESTORATIVE TECHNICIAN NOTE
Restorative Technician Note      Patient Name: Arnold Avitia     Note Type: Mobility  Activity Performed: Ambulated  Patient Position at End of Consult: All needs within reach;  Supine

## 2023-03-17 NOTE — ASSESSMENT & PLAN NOTE
S/p PPM placement by cardiology  Cleared for discharge with outpatient follow-up  Cardiology notes noted

## 2023-03-20 ENCOUNTER — TRANSITIONAL CARE MANAGEMENT (OUTPATIENT)
Dept: FAMILY MEDICINE CLINIC | Facility: HOSPITAL | Age: 83
End: 2023-03-20

## 2023-03-20 NOTE — UTILIZATION REVIEW
NOTIFICATION OF ADMISSION DISCHARGE   This is a Notification of Discharge from [facility]  Please be advised that this patient has been discharge from our facility  Below you will find the admission and discharge date and time including the patient’s disposition  UTILIZATION REVIEW CONTACT:  Raven Sterling  Utilization   Network Utilization Review Department  Phone: [phone] x carefully listen to the prompts  All voicemails are confidential   Email: [email or web address]     ADMISSION INFORMATION  PRESENTATION DATE: 3/15/2023 12:58 PM  OBERVATION ADMISSION DATE:   INPATIENT ADMISSION DATE: 3/15/23  2:16 PM   DISCHARGE DATE: 3/17/2023  3:27 PM   DISPOSITION:Home/Self Care    IMPORTANT INFORMATION:  Send all requests for admission clinical reviews, approved or denied determinations and any other requests to dedicated fax number below belonging to the campus where the patient is receiving treatment   List of dedicated fax numbers:  [address] DENIALS (Administrative/Medical Necessity) [phone]   [address] (Maternity/NICU/Pediatrics) [phone]   [facility] [phone]   [place] [address] [phone]   [address] [phone]   [facility] [phone] [facility] [phone]   [address] [place] [address] [phone]   [facility]  [phone]   [facility] [phone]   [facility] [address] [facility] [phone]

## 2023-03-29 ENCOUNTER — IN-CLINIC DEVICE VISIT (OUTPATIENT)
Dept: CARDIOLOGY CLINIC | Facility: CLINIC | Age: 83
End: 2023-03-29

## 2023-03-29 ENCOUNTER — OFFICE VISIT (OUTPATIENT)
Dept: FAMILY MEDICINE CLINIC | Facility: HOSPITAL | Age: 83
End: 2023-03-29

## 2023-03-29 VITALS
HEIGHT: 69 IN | HEART RATE: 66 BPM | SYSTOLIC BLOOD PRESSURE: 130 MMHG | WEIGHT: 171 LBS | DIASTOLIC BLOOD PRESSURE: 72 MMHG | BODY MASS INDEX: 25.33 KG/M2 | OXYGEN SATURATION: 97 %

## 2023-03-29 DIAGNOSIS — E11.9 TYPE 2 DIABETES MELLITUS WITHOUT COMPLICATION, WITHOUT LONG-TERM CURRENT USE OF INSULIN (HCC): ICD-10-CM

## 2023-03-29 DIAGNOSIS — N18.30 STAGE 3 CHRONIC KIDNEY DISEASE, UNSPECIFIED WHETHER STAGE 3A OR 3B CKD (HCC): ICD-10-CM

## 2023-03-29 DIAGNOSIS — I44.2 COMPLETE HEART BLOCK (HCC): Primary | ICD-10-CM

## 2023-03-29 DIAGNOSIS — Z95.0 CARDIAC PACEMAKER IN SITU: Primary | ICD-10-CM

## 2023-03-29 DIAGNOSIS — I10 BENIGN ESSENTIAL HTN: ICD-10-CM

## 2023-03-29 DIAGNOSIS — E78.2 MIXED HYPERLIPIDEMIA: ICD-10-CM

## 2023-03-29 NOTE — PROGRESS NOTES
"  1527 Susie, DO  Transition of Care Management Follow Up  Assessment/Plan:      Diagnosis ICD-10-CM Associated Orders   1  Complete heart block (HCC)  I44 2       2  Benign essential HTN  I10       3  Stage 3 chronic kidney disease, unspecified whether stage 3a or 3b CKD (HCC)  N18 30       4  Type 2 diabetes mellitus without complication, without long-term current use of insulin (HCC)  E11 9       5  Mixed hyperlipidemia  E78 2         • No medicine changes  Reviewed from d/c at [facility]  Pacer in place  • C/W cardiology appts - one due to be scheduled now with Dr Carter Adams  • Please call with any questions or concerns as needed  Return in about 8 weeks (around 5/24/2023) for F/U Chronic Conditions, will review labs   ______________________________________________________________________  History of Present Illness   Breanna Goldman is here for follow up of their hospitalization/transition of care appointment  HPI  Pt last seen in office & low HR, fatigue, low energy  40 bpm    EKG showed CHB  Sent to B & admitted for 2 nights  Dr Dorene Sabillon did pacer placement on 3/16/23  D/C'd next day home  Feeling better  Wound clean & intact  Seen by cardio for device check today  \"AP 32%  100%  ALL AVAILABLE LEAD PARAMETERS WITHIN NORMAL LIMITS  NO SIGNIFICANT HIGH RATE EPISODES  NO PROGRAMMING CHANGES MADE TO DEVICE PARAMETERS  NORMAL DEVICE FUNCTION  \"    Medication List at Discharge       amLODIPine Besylate 5 mg take 1 tablet by mouth once daily     Continuous Blood Gluc Sensor 1 application   Does not apply Every 14 days     Fluticasone Propionate 50 mcg/act 1 spray Nasal Daily     Glimepiride 4 mg Oral Daily with breakfast     Lisinopril 10 mg Oral Daily     Loratadine 10 mg Oral Daily     metFORMIN HCl 500 mg Oral 2 times daily with meals     Multiple Vitamin 1 tablet Oral Daily     Simvastatin 20 mg Oral Daily at bedtime      TCM Call     Date and time call was made  " 3/20/2023 11:18 AM    Patient was hospitialized at  [facility]    Date of Admission  03/15/23    Date of discharge  03/17/23    Diagnosis  complete heart block    Disposition  Home    Were the patients medications reviewed and updated  Yes    Current Symptoms  None      TCM Call     Scheduled for follow up? Yes    I have advised the patient to call PCP with any new or worsening symptoms  DAYA HinsonG [address]  Family members    Comments  spoke to patient, states he is doing well  meds reviewed  call sent up front to schedule TCM ov  CR        The following portions of the patient's history were reviewed and updated as appropriate: allergies, current medications, past family history, past medical history, past social history, past surgical history and problem list     Review of Systems   Review of Systems   Constitutional: Negative for chills and fever  Respiratory: Negative for cough and shortness of breath  Cardiovascular: Negative for chest pain, palpitations and leg swelling  Neurological: Negative for dizziness, light-headedness and headaches  Past Medical History     History reviewed  No pertinent past medical history  Past Surgical History     Past Surgical History:   Procedure Laterality Date   • CARDIAC ELECTROPHYSIOLOGY PROCEDURE N/A 3/16/2023    Procedure: Cardiac pacer implant;  Surgeon: Kenia Russo MD;  Location: [facility] CARDIAC [facility]ATH [facility]; Service: Cardiology   • NO PAST SURGERIES         Social History     Social History     Socioeconomic History   • Marital status:       Spouse name: None   • Number of children: None   • Years of education: None   • Highest education level: None   Occupational History   • None   Tobacco Use   • Smoking status: Never   • Smokeless tobacco: Never   Vaping Use   • Vaping Use: Never used   Substance and Sexual Activity   • Alcohol use: Not Currently   • Drug use: Never   • Sexual activity: Not Currently   Other Topics Concern   • None   Social History Narrative          Social Determinants of Health     Financial Resource Strain: Low Risk    • Difficulty of Paying Living Expenses: Not hard at all   Food Insecurity: No Food Insecurity   • Worried About Running Out of Food in the Last Year: Never true   • Ran Out of Food in the Last Year: Never true   Transportation Needs: No Transportation Needs   • Lack of Transportation (Medical): No   • Lack of Transportation (Non-Medical):  No   Physical Activity: Not on file   Stress: Not on file   Social Connections: Not on file   Intimate Partner Violence: Not on file   Housing Stability: Low Risk    • Unable to Pay for Housing in the Last Year: No   • Number of Places Lived in the Last Year: 1   • Unstable Housing in the Last Year: No       Family History     Family History   Problem Relation Age of Onset   • Diabetes Sister    • Diabetes Brother    • Colon cancer Neg Hx    • Colon polyps Neg Hx        Current Medications     Current Outpatient Medications:   •  amLODIPine (NORVASC) 5 mg tablet, take 1 tablet by mouth once daily, Disp: 90 tablet, Rfl: 1  •  Continuous Blood Gluc Sensor (FreeStyle Dinorah 14 Day Sensor) MISC, Use 1 application every 14 (fourteen) days, Disp: 4 each, Rfl: 3  •  fluticasone (FLONASE) 50 mcg/act nasal spray, 1 spray into each nostril daily, Disp: 16 g, Rfl: 2  •  glimepiride (AMARYL) 4 mg tablet, Take 1 tablet (4 mg total) by mouth daily with breakfast, Disp: 90 tablet, Rfl: 3  •  lisinopril (ZESTRIL) 10 mg tablet, Take 1 tablet (10 mg total) by mouth daily, Disp: 90 tablet, Rfl: 3  •  loratadine (CLARITIN) 10 mg tablet, Take 1 tablet (10 mg total) by mouth daily, Disp: 90 tablet, Rfl: 1  •  metFORMIN (GLUCOPHAGE-XR) 500 mg 24 hr tablet, Take 1 tablet (500 mg total) by mouth 2 (two) times a day with meals, Disp: 180 tablet, Rfl: 2  •  multivitamin (THERAGRAN) TABS, Take 1 tablet by mouth daily, Disp: , Rfl:   •  simvastatin (ZOCOR) "20 mg tablet, Take 1 tablet (20 mg total) by mouth daily at bedtime, Disp: 90 tablet, Rfl: 3     Allergies   No Known Allergies    Objective   /72   Pulse 66   Ht 5' 9\" (1 753 m)   Wt 77 6 kg (171 lb)   SpO2 97%   BMI 25 25 kg/m²   Wt Readings from Last 3 Encounters:   03/29/23 77 6 kg (171 lb)   03/17/23 77 kg (169 lb 12 1 oz)   03/15/23 78 5 kg (173 lb)     BP Readings from Last 3 Encounters:   03/29/23 130/72   03/17/23 148/67   03/15/23 170/58     Pulse Readings from Last 3 Encounters:   03/29/23 66   03/17/23 68   03/15/23 (!) 40     Body mass index is 25 25 kg/m²  Physical Exam  Vitals reviewed  Constitutional:       General: He is not in acute distress  Appearance: Normal appearance  He is well-developed  He is not ill-appearing  HENT:      Head: Normocephalic and atraumatic  Eyes:      General: No scleral icterus  Right eye: No discharge  Left eye: No discharge  Cardiovascular:      Rate and Rhythm: Normal rate and regular rhythm  Pulses: Normal pulses  Heart sounds: Normal heart sounds  No murmur heard  No friction rub  Pulmonary:      Effort: Pulmonary effort is normal  No respiratory distress  Breath sounds: Normal breath sounds  No stridor  No wheezing  Musculoskeletal:      Cervical back: Normal range of motion  No rigidity  Right lower leg: No edema  Left lower leg: No edema  Skin:     General: Skin is warm and dry  Comments: Left chest pacer in place, C,D, I - non tender   Neurological:      Mental Status: He is alert and oriented to person, place, and time  Gait: Gait normal    Psychiatric:         Mood and Affect: Mood normal          Behavior: Behavior normal          Thought Content:  Thought content normal          Judgment: Judgment normal              "

## 2023-03-29 NOTE — PROGRESS NOTES
Results for orders placed or performed in visit on 03/29/23   Cardiac EP device report    Narrative    MDT DC/PM-ACTIVE SYSTEM IS MRI CONDITIONAL  WOUND CHECK: INCISION CLEAN AND DRY WITH EDGES APPROXIMATED; WOUND CARE AND RESTRICTIONS REVIEWED WITH PATIENT  2 PICS INCLUDED  DEVICE INTERROGATED IN THE [place] OFFICE: BATTERY VOLTAGE ADEQUATE-12 YRS  AP 32%  100%  ALL AVAILABLE LEAD PARAMETERS WITHIN NORMAL LIMITS  NO SIGNIFICANT HIGH RATE EPISODES  NO PROGRAMMING CHANGES MADE TO DEVICE PARAMETERS  NORMAL DEVICE FUNCTION   NC

## 2023-03-29 NOTE — PATIENT INSTRUCTIONS
Make an appt with Dr Aashish Mcmahon locally -[facility]: [phone]

## 2023-04-04 LAB
ALBUMIN SERPL-MCNC: 4.4 G/DL (ref 3.6–4.6)
ALBUMIN/CREAT UR: 16 MG/G CREAT (ref 0–29)
ALBUMIN/GLOB SERPL: 1.5 {RATIO} (ref 1.2–2.2)
ALP SERPL-CCNC: 82 IU/L (ref 44–121)
ALT SERPL-CCNC: 16 IU/L (ref 0–44)
AST SERPL-CCNC: 21 IU/L (ref 0–40)
BASOPHILS # BLD AUTO: 0 X10E3/UL (ref 0–0.2)
BASOPHILS NFR BLD AUTO: 0 %
BILIRUB SERPL-MCNC: 0.5 MG/DL (ref 0–1.2)
BUN SERPL-MCNC: 13 MG/DL (ref 8–27)
BUN/CREAT SERPL: 11 (ref 10–24)
CALCIUM SERPL-MCNC: 9.6 MG/DL (ref 8.6–10.2)
CHLORIDE SERPL-SCNC: 103 MMOL/L (ref 96–106)
CHOLEST SERPL-MCNC: 147 MG/DL (ref 100–199)
CO2 SERPL-SCNC: 23 MMOL/L (ref 20–29)
CREAT SERPL-MCNC: 1.18 MG/DL (ref 0.76–1.27)
CREAT UR-MCNC: 122.2 MG/DL
EGFR: 62 ML/MIN/1.73
EOSINOPHIL # BLD AUTO: 0.2 X10E3/UL (ref 0–0.4)
EOSINOPHIL NFR BLD AUTO: 2 %
ERYTHROCYTE [DISTWIDTH] IN BLOOD BY AUTOMATED COUNT: 10.7 % (ref 11.6–15.4)
EST. AVERAGE GLUCOSE BLD GHB EST-MCNC: 171 MG/DL
GLOBULIN SER-MCNC: 2.9 G/DL (ref 1.5–4.5)
GLUCOSE SERPL-MCNC: 174 MG/DL (ref 70–99)
HBA1C MFR BLD: 7.6 % (ref 4.8–5.6)
HCT VFR BLD AUTO: 40.1 % (ref 37.5–51)
HDLC SERPL-MCNC: 53 MG/DL
HGB BLD-MCNC: 13.8 G/DL (ref 13–17.7)
IMM GRANULOCYTES # BLD: 0 X10E3/UL (ref 0–0.1)
IMM GRANULOCYTES NFR BLD: 0 %
LDLC SERPL CALC-MCNC: 74 MG/DL (ref 0–99)
LDLC/HDLC SERPL: 1.4 RATIO (ref 0–3.6)
LYMPHOCYTES # BLD AUTO: 2.2 X10E3/UL (ref 0.7–3.1)
LYMPHOCYTES NFR BLD AUTO: 25 %
MCH RBC QN AUTO: 33.5 PG (ref 26.6–33)
MCHC RBC AUTO-ENTMCNC: 34.4 G/DL (ref 31.5–35.7)
MCV RBC AUTO: 97 FL (ref 79–97)
MICROALBUMIN UR-MCNC: 19.2 UG/ML
MONOCYTES # BLD AUTO: 0.8 X10E3/UL (ref 0.1–0.9)
MONOCYTES NFR BLD AUTO: 8 %
NEUTROPHILS # BLD AUTO: 5.7 X10E3/UL (ref 1.4–7)
NEUTROPHILS NFR BLD AUTO: 65 %
PLATELET # BLD AUTO: 285 X10E3/UL (ref 150–450)
POTASSIUM SERPL-SCNC: 4.6 MMOL/L (ref 3.5–5.2)
PROT SERPL-MCNC: 7.3 G/DL (ref 6–8.5)
RBC # BLD AUTO: 4.12 X10E6/UL (ref 4.14–5.8)
SL AMB VLDL CHOLESTEROL CALC: 20 MG/DL (ref 5–40)
SODIUM SERPL-SCNC: 141 MMOL/L (ref 134–144)
TRIGL SERPL-MCNC: 113 MG/DL (ref 0–149)
WBC # BLD AUTO: 9 X10E3/UL (ref 3.4–10.8)

## 2023-05-04 ENCOUNTER — VBI (OUTPATIENT)
Dept: ADMINISTRATIVE | Facility: OTHER | Age: 83
End: 2023-05-04

## 2023-05-30 ENCOUNTER — OFFICE VISIT (OUTPATIENT)
Dept: FAMILY MEDICINE CLINIC | Facility: HOSPITAL | Age: 83
End: 2023-05-30

## 2023-05-30 VITALS
SYSTOLIC BLOOD PRESSURE: 141 MMHG | HEIGHT: 69 IN | WEIGHT: 173 LBS | HEART RATE: 81 BPM | DIASTOLIC BLOOD PRESSURE: 82 MMHG | OXYGEN SATURATION: 99 % | BODY MASS INDEX: 25.62 KG/M2

## 2023-05-30 DIAGNOSIS — N18.30 STAGE 3 CHRONIC KIDNEY DISEASE, UNSPECIFIED WHETHER STAGE 3A OR 3B CKD (HCC): ICD-10-CM

## 2023-05-30 DIAGNOSIS — R09.81 NASAL SINUS CONGESTION: ICD-10-CM

## 2023-05-30 DIAGNOSIS — E78.2 MIXED HYPERLIPIDEMIA: ICD-10-CM

## 2023-05-30 DIAGNOSIS — I10 BENIGN ESSENTIAL HTN: ICD-10-CM

## 2023-05-30 DIAGNOSIS — I44.2 COMPLETE HEART BLOCK (HCC): ICD-10-CM

## 2023-05-30 DIAGNOSIS — E11.9 TYPE 2 DIABETES MELLITUS WITHOUT COMPLICATION, WITHOUT LONG-TERM CURRENT USE OF INSULIN (HCC): Primary | ICD-10-CM

## 2023-05-30 NOTE — PROGRESS NOTES
[facility],     Assessment/Plan:      Diagnosis ICD-10-CM Associated Orders   1  Type 2 diabetes mellitus without complication, without long-term current use of insulin (HCC)  E11 9       2  Benign essential HTN  I10       3  Stage 3 chronic kidney disease, unspecified whether stage 3a or 3b CKD (HCC)  N18 30       4  BMI 25 0-25 9,adult  Z68 25       5  Nasal sinus congestion  R09 81       6  Mixed hyperlipidemia  E78 2       7  Complete heart block (HCC)  I44 2         • Labs from April reviewed  • Patient's medications were reviewed and reconciled  Ordered/Re-ordered as above  • Cardio appt in July  Return in 1 month (on 6/30/2023) for Diabetes Follow-up - check A1c in July   • Patient may call or return to office with any questions or concerns  ______________________________________________________________________  Subjective:     Patient ID: Lamberto Hyman is a 81 y o  male  HPI  Lamberto Hyman  Chief Complaint   Patient presents with   • Follow-up     Review labs         Dr Nitesh Ashraf did pacer placement on 3/16/23  Feeling well, no major issues  Walking & treadmill use with nice weather  Wants to get bike as well  Home Bps 119/70 or so  No CP, SOB, AGUIAR, palps  Wt Readings from Last 3 Encounters:   05/30/23 78 5 kg (173 lb)   03/29/23 77 6 kg (171 lb)   03/17/23 77 kg (169 lb 12 1 oz)     April A1c 7 6  The following portions of the patient's history were reviewed and updated as appropriate: allergies, current medications, past medical history, and problem list     Review of Systems   Constitutional: Negative for chills and fever  Respiratory: Negative for cough and shortness of breath  Neurological: Negative for dizziness, light-headedness and headaches  Objective:      Vitals:    05/30/23 0846   BP: 141/82   Pulse: 81   SpO2: 99%      Physical Exam  Vitals reviewed  Constitutional:       General: He is not in acute distress       Appearance: Normal appearance  He is well-developed and normal weight  He is not ill-appearing  HENT:      Head: Normocephalic and atraumatic  Eyes:      General: No scleral icterus  Right eye: No discharge  Left eye: No discharge  Cardiovascular:      Rate and Rhythm: Normal rate and regular rhythm  Pulses: Normal pulses  no weak pulses          Dorsalis pedis pulses are 2+ on the right side and 2+ on the left side  Heart sounds: Normal heart sounds  No murmur heard  No friction rub  Pulmonary:      Effort: Pulmonary effort is normal  No respiratory distress  Breath sounds: Normal breath sounds  No stridor  No wheezing  Musculoskeletal:      Cervical back: Normal range of motion  No rigidity  Right lower leg: No edema  Left lower leg: No edema  Feet:      Right foot:      Skin integrity: No ulcer, skin breakdown, erythema, warmth, callus or dry skin  Left foot:      Skin integrity: No ulcer, skin breakdown, erythema, warmth, callus or dry skin  Skin:     General: Skin is warm and dry  Neurological:      Mental Status: He is alert and oriented to person, place, and time  Gait: Gait normal    Psychiatric:         Mood and Affect: Mood normal          Behavior: Behavior normal          Thought Content: Thought content normal          Judgment: Judgment normal          Patient's shoes and socks removed  Right Foot/Ankle   Right Foot Inspection  Skin Exam: skin normal and skin intact  No dry skin, no warmth, no callus, no erythema, no maceration, no abnormal color, no pre-ulcer, no ulcer and no callus  Toe Exam: ROM and strength within normal limits  Sensory   Proprioception: intact  Monofilament testing: intact    Vascular  The right DP pulse is 2+  Left Foot/Ankle  Left Foot Inspection  Skin Exam: skin normal and skin intact  No dry skin, no warmth, no erythema, no maceration, normal color, no pre-ulcer, no ulcer and no callus       Toe Exam: ROM "and strength within normal limits  Sensory   Proprioception: intact  Monofilament testing: intact    Vascular  The left DP pulse is 2+  Assign Risk Category  No deformity present  No loss of protective sensation  No weak pulses  Risk: 0        Portions of the record may have been created with voice recognition software  Occasional wrong word or \"sound alike\" substitutions may have occurred due to the inherent limitations of voice recognition software  Please review the chart carefully and recognize, using context, where substitutions/typographical errors may have occurred       "

## 2023-06-22 DIAGNOSIS — R03.0 ELEVATED BP WITHOUT DIAGNOSIS OF HYPERTENSION: ICD-10-CM

## 2023-06-22 RX ORDER — AMLODIPINE BESYLATE 5 MG/1
TABLET ORAL
Qty: 90 TABLET | Refills: 1 | Status: SHIPPED | OUTPATIENT
Start: 2023-06-22

## 2023-07-06 ENCOUNTER — OFFICE VISIT (OUTPATIENT)
Dept: CARDIOLOGY CLINIC | Facility: CLINIC | Age: 83
End: 2023-07-06
Payer: COMMERCIAL

## 2023-07-06 VITALS
WEIGHT: 175.6 LBS | DIASTOLIC BLOOD PRESSURE: 78 MMHG | BODY MASS INDEX: 26.01 KG/M2 | HEART RATE: 96 BPM | HEIGHT: 69 IN | SYSTOLIC BLOOD PRESSURE: 146 MMHG

## 2023-07-06 DIAGNOSIS — I44.2 COMPLETE HEART BLOCK (HCC): Primary | ICD-10-CM

## 2023-07-06 DIAGNOSIS — E11.9 TYPE 2 DIABETES MELLITUS WITHOUT COMPLICATION, WITHOUT LONG-TERM CURRENT USE OF INSULIN (HCC): ICD-10-CM

## 2023-07-06 DIAGNOSIS — R93.1 ABNORMAL ECHOCARDIOGRAM: ICD-10-CM

## 2023-07-06 DIAGNOSIS — Z95.0 HX OF CARDIAC PACEMAKER: ICD-10-CM

## 2023-07-06 DIAGNOSIS — I10 BENIGN ESSENTIAL HTN: ICD-10-CM

## 2023-07-06 DIAGNOSIS — E78.2 MIXED HYPERLIPIDEMIA: ICD-10-CM

## 2023-07-06 PROCEDURE — 1160F RVW MEDS BY RX/DR IN RCRD: CPT | Performed by: INTERNAL MEDICINE

## 2023-07-06 PROCEDURE — 99214 OFFICE O/P EST MOD 30 MIN: CPT | Performed by: INTERNAL MEDICINE

## 2023-07-06 PROCEDURE — 1159F MED LIST DOCD IN RCRD: CPT | Performed by: INTERNAL MEDICINE

## 2023-07-06 NOTE — PROGRESS NOTES
Cardiology Follow-up    Pham Pineda  [id number]  1940  [address] [facility] [place]  [facility]  [place] [address] [phone]    1. Complete heart block (HCC)  NM myocardial perfusion spect (rx stress and/or rest)      2. Mixed hyperlipidemia        3. Benign essential HTN        4. Type 2 diabetes mellitus without complication, without long-term current use of insulin ([address])        5. Abnormal echocardiogram  NM myocardial perfusion spect (rx stress and/or rest)      6. Hx of cardiac pacemaker              Discussion/Summary:    1. History of bifascicular block and complete heart block - Fabricio Fonseca progressed to complete heart block this past March and is now status post permanent pacemaker. He will continue to have his pacemaker follow the device clinic. First interrogation was normal.  We will see him back in 6 months. 2.  Hypertension - This was uncontrolled when we first met him. He was started on amlodipine and then we added lisinopril to his regimen. It is mildly elevated today but runs normal on other occasions. It is encouraged for him to continue to check his blood pressure at home. 3.  Hyperlipidemia - On simvastatin. He has blood work followed regularly by his PCP. 4.  Abnormal echocardiogram and ECG - When I first met Fabricio Fonseca he had a bifascicular block. He then had an echocardiogram that showed regional wall motion abnormalities in the mid to apical inferior lateral wall. A stress echocardiogram was ordered at that time but this was never performed. I did order a pharmacologic nuclear stress test today to address this, and this type of test is needed given his ventricular pacing. HPI:    Mr. Tresa Jalloh comes in for follow-up given his history of permanent pacemaker and his risk factors for cardiovascular disease. Lottie Qureshi last year in consultation.   At that time his history included hypertension, hyperlipidemia, diabetes mellitus and has chronic kidney disease. He has been on simvastatin 20 mg daily for hyperlipidemia and a few months prior was started on amlodipine 5 mg daily. At that time we obtained an ECG that showed a bifascicular block, right bundle branch block and left anterior fascicular block. Given this we ordered some baseline testing. He underwent an echocardiogram which showed normal LV systolic function and regional wall motion abnormalities in the mid to distal inferior lateral wall to apex. We did order a stress echocardiogram but he never had this done. Then in March of this year Juventino Hernandez was having worsening fatigue and generalized weakness. He went to his PCP for a visit and he was found to be in complete heart block. At that point he went to the emergency room and is now status post permanent pacemaker which went well. Pacemaker interrogation was normal and he was 100% V-paced. Mr. Herbert Callahan has felt better since his permanent pacemaker. His fatigue and weakness improved. He is active without limitations. He denies any cardiac symptoms. No chest pain or any symptoms of angina. No shortness of breath or any signs/symptoms of CHF. His blood pressure is mildly elevated today but he does check this at home and he states that his systolic blood pressures run around 130. He denies palpitations, lightheadedness or syncope. Patient Active Problem List   Diagnosis   • Mixed hyperlipidemia   • Type 2 diabetes mellitus without complication, without long-term current use of insulin ([facility])   • Nasal sinus congestion   • BMI 25.0-25.9,adult   • Stage 3 chronic kidney disease, unspecified whether stage 3a or 3b CKD ([facility])   • Benign essential HTN   • Bifascicular block   • Complete heart block ([address])   • Hx of cardiac pacemaker     History reviewed. No pertinent past medical history. Social History     Socioeconomic History   • Marital status:       Spouse name: Not on file   • Number of children: Not on file   • Years of education: Not on file   • Highest education level: Not on file   Occupational History   • Not on file   Tobacco Use   • Smoking status: Never   • Smokeless tobacco: Never   Vaping Use   • Vaping Use: Never used   Substance and Sexual Activity   • Alcohol use: Not Currently   • Drug use: Never   • Sexual activity: Not Currently   Other Topics Concern   • Not on file   Social History Narrative          Social Determinants of Health     Financial Resource Strain: Low Risk  (10/12/2022)    Overall Financial Resource Strain (CARDIA)    • Difficulty of Paying Living Expenses: Not hard at all   Food Insecurity: No Food Insecurity (3/17/2023)    Hunger Vital Sign    • Worried About Running Out of Food in the Last Year: Never true    • Ran Out of Food in the Last Year: Never true   Transportation Needs: No Transportation Needs (3/17/2023)    PRAPARE - Transportation    • Lack of Transportation (Medical): No    • Lack of Transportation (Non-Medical): No   Physical Activity: Not on file   Stress: Not on file   Social Connections: Not on file   Intimate Partner Violence: Not on file   Housing Stability: Low Risk  (3/17/2023)    Housing Stability Vital Sign    • Unable to Pay for Housing in the Last Year: No    • Number of Places Lived in the Last Year: 1    • Unstable Housing in the Last Year: No      Family History   Problem Relation Age of Onset   • Diabetes Sister    • Diabetes Brother    • Colon cancer Neg Hx    • Colon polyps Neg Hx      Past Surgical History:   Procedure Laterality Date   • CARDIAC ELECTROPHYSIOLOGY PROCEDURE N/A 3/16/2023    Procedure: Cardiac pacer implant;  Surgeon: Esther Cooper MD;  Location: [facility];   Service: Cardiology   • NO PAST SURGERIES         Current Outpatient Medications:   •  amLODIPine (NORVASC) 5 mg tablet, take 1 tablet by mouth once daily, Disp: 90 tablet, Rfl: 1  •  Continuous Blood Gluc Sensor (FreeStyle Dinorah 14 Day Sensor) MISC, Use 1 application every 14 (fourteen) days, Disp: 4 each, Rfl: 3  •  fluticasone (FLONASE) 50 mcg/act nasal spray, 1 spray into each nostril daily, Disp: 16 g, Rfl: 2  •  glimepiride (AMARYL) 4 mg tablet, Take 1 tablet (4 mg total) by mouth daily with breakfast, Disp: 90 tablet, Rfl: 3  •  lisinopril (ZESTRIL) 10 mg tablet, Take 1 tablet (10 mg total) by mouth daily, Disp: 90 tablet, Rfl: 3  •  metFORMIN (GLUCOPHAGE-XR) 500 mg 24 hr tablet, Take 1 tablet (500 mg total) by mouth 2 (two) times a day with meals, Disp: 180 tablet, Rfl: 2  •  multivitamin (THERAGRAN) TABS, Take 1 tablet by mouth daily, Disp: , Rfl:   •  simvastatin (ZOCOR) 20 mg tablet, Take 1 tablet (20 mg total) by mouth daily at bedtime, Disp: 90 tablet, Rfl: 3  No Known Allergies    Labs:  Lab Results   Component Value Date    K 4.6 04/03/2023    K 3.7 03/17/2023    K 3.7 03/16/2023    K 4.1 03/15/2023    K 4.5 06/17/2022    CO2 23 04/03/2023    CO2 23 03/17/2023    CO2 25 03/16/2023    CO2 24 03/15/2023    CO2 22 06/17/2022    BUN 13 04/03/2023    BUN 22 03/17/2023    BUN 23 03/16/2023    BUN 20 03/15/2023    BUN 13 06/17/2022    CREATININE 1.18 04/03/2023    CREATININE 1.15 03/17/2023    CREATININE 1.22 03/16/2023    CREATININE 1.26 03/15/2023    CALCIUM 9.0 03/17/2023    CALCIUM 9.1 03/16/2023    CALCIUM 9.4 03/15/2023     Lab Results   Component Value Date    WBC 9.0 04/03/2023    WBC 10.14 03/17/2023    HGB 13.8 04/03/2023    HGB 14.0 03/17/2023    HCT 40.1 04/03/2023    HCT 44.1 03/17/2023    MCV 97 04/03/2023     (H) 03/17/2023     04/03/2023     03/17/2023     Lab Results   Component Value Date    TRIG 113 04/03/2023    HDL 53 04/03/2023     Imaging:  ECG obtained today demonstrates sinus rhythm with occasional PACs, right bundle branch block and left anterior fascicular block (bifascicular block). Review of Systems:  Review of Systems   Constitutional: Negative. HENT: Negative. Eyes: Negative. Respiratory: Negative. Cardiovascular: Negative. Gastrointestinal: Negative. Musculoskeletal: Negative. Skin: Negative. Allergic/Immunologic: Negative. Neurological: Negative. Hematological: Negative. Psychiatric/Behavioral: Negative. All other systems reviewed and are negative. Vitals:    07/06/23 1053   BP: 146/78   BP Location: Left arm   Patient Position: Sitting   Cuff Size: Standard   Pulse: 96   Weight: 79.7 kg (175 lb 9.6 oz)   Height: 5' 9" (1.753 m)       Physical Exam:  Physical Exam  Vitals and nursing note reviewed. Constitutional:       Appearance: He is well-developed. HENT:      Head: Normocephalic and atraumatic. Eyes:      General: No scleral icterus. Right eye: No discharge. Left eye: No discharge. Pupils: Pupils are equal, round, and reactive to light. Neck:      Thyroid: No thyromegaly. Vascular: No JVD. Cardiovascular:      Rate and Rhythm: Normal rate and regular rhythm. No extrasystoles are present. Pulses: Normal pulses. No decreased pulses. Heart sounds: Normal heart sounds, S1 normal and S2 normal. No murmur heard. No friction rub. No gallop. Pulmonary:      Effort: Pulmonary effort is normal. No respiratory distress. Breath sounds: Normal breath sounds. No wheezing or rales. Abdominal:      General: Bowel sounds are normal. There is no distension. Palpations: Abdomen is soft. Tenderness: There is no abdominal tenderness. Musculoskeletal:         General: No tenderness or deformity. Normal range of motion. Cervical back: Normal range of motion and neck supple. Skin:     General: Skin is warm and dry. Findings: No rash. Neurological:      Mental Status: He is alert and oriented to person, place, and time. Cranial Nerves: No cranial nerve deficit. Psychiatric:         Thought Content:  Thought content normal.         Judgment: Judgment normal. Counseling / Coordination of Care  Total office time spent today 25 minutes. Greater than 50% of total time was spent with the patient and / or family counseling and / or coordination of care.

## 2023-07-07 ENCOUNTER — CLINICAL SUPPORT (OUTPATIENT)
Dept: FAMILY MEDICINE CLINIC | Facility: HOSPITAL | Age: 83
End: 2023-07-07
Payer: COMMERCIAL

## 2023-07-07 DIAGNOSIS — E11.9 TYPE 2 DIABETES MELLITUS WITHOUT COMPLICATION, WITHOUT LONG-TERM CURRENT USE OF INSULIN (HCC): Primary | ICD-10-CM

## 2023-07-07 LAB — SL AMB POCT HEMOGLOBIN AIC: 7.3 (ref ?–6.5)

## 2023-07-07 PROCEDURE — 83036 HEMOGLOBIN GLYCOSYLATED A1C: CPT

## 2023-09-10 DIAGNOSIS — I10 PRIMARY HYPERTENSION: ICD-10-CM

## 2023-09-11 RX ORDER — LISINOPRIL 10 MG/1
10 TABLET ORAL DAILY
Qty: 90 TABLET | Refills: 3 | Status: SHIPPED | OUTPATIENT
Start: 2023-09-11

## 2023-09-26 ENCOUNTER — OFFICE VISIT (OUTPATIENT)
Dept: FAMILY MEDICINE CLINIC | Facility: HOSPITAL | Age: 83
End: 2023-09-26
Payer: COMMERCIAL

## 2023-09-26 VITALS
HEART RATE: 47 BPM | HEIGHT: 69 IN | SYSTOLIC BLOOD PRESSURE: 148 MMHG | WEIGHT: 172 LBS | DIASTOLIC BLOOD PRESSURE: 80 MMHG | OXYGEN SATURATION: 98 % | BODY MASS INDEX: 25.48 KG/M2

## 2023-09-26 DIAGNOSIS — E78.2 MIXED HYPERLIPIDEMIA: ICD-10-CM

## 2023-09-26 DIAGNOSIS — I10 BENIGN ESSENTIAL HTN: ICD-10-CM

## 2023-09-26 DIAGNOSIS — N18.2 CKD (CHRONIC KIDNEY DISEASE) STAGE 2, GFR 60-89 ML/MIN: ICD-10-CM

## 2023-09-26 DIAGNOSIS — E11.9 TYPE 2 DIABETES MELLITUS WITHOUT COMPLICATION, WITHOUT LONG-TERM CURRENT USE OF INSULIN (HCC): Primary | ICD-10-CM

## 2023-09-26 DIAGNOSIS — Z95.0 HX OF CARDIAC PACEMAKER: ICD-10-CM

## 2023-09-26 DIAGNOSIS — R09.81 NASAL SINUS CONGESTION: ICD-10-CM

## 2023-09-26 DIAGNOSIS — I44.2 COMPLETE HEART BLOCK (HCC): ICD-10-CM

## 2023-09-26 PROCEDURE — 99214 OFFICE O/P EST MOD 30 MIN: CPT | Performed by: STUDENT IN AN ORGANIZED HEALTH CARE EDUCATION/TRAINING PROGRAM

## 2023-09-26 NOTE — PROGRESS NOTES
[address], [place]    Assessment/Plan:      Diagnosis ICD-10-CM Associated Orders   1. Type 2 diabetes mellitus without complication, without long-term current use of insulin (HCC)  E11.9       2. CKD (chronic kidney disease) stage 2, GFR 60-89 ml/min  N18.2       3. Mixed hyperlipidemia  E78.2       4. Hx of cardiac pacemaker  Z95.0       5. Complete heart block (HCC)  I44.2       6. BMI 25.0-25.9,adult  Z68.25       7. Benign essential HTN  I10       8. Nasal sinus congestion  R09.81         • Patient's medications were reviewed and reconciled. Ordered/Re-ordered as above. • No dental abx ppx for this week's appt. • Will order yrly labs at next visit for springtime. Return in about 4 weeks (around 10/24/2023) for Annual Medicare Wellness - with an in office A1c . • Patient may call or return to office with any questions or concerns. ______________________________________________________________________  Subjective:     Patient ID: Gonzalez Kahn is a 81 y.o. male. HPI  Gonzalez Kahn  Chief Complaint   Patient presents with   • Follow-up     Feeling well. No recent illness. Dental appt this week. Home /73 this morning. Typically in 130/70's. Not having LH, DZ, or HA or BV. Wt Readings from Last 3 Encounters:   09/26/23 78 kg (172 lb)   07/06/23 79.7 kg (175 lb 9.6 oz)   05/30/23 78.5 kg (173 lb)        The following portions of the patient's history were reviewed and updated as appropriate: allergies, current medications, past medical history, and problem list.    Review of Systems   Constitutional: Negative for chills and fever. Respiratory: Negative for cough and shortness of breath. Cardiovascular: Negative for chest pain and palpitations. Objective:      Vitals:    09/26/23 0833   BP: 148/80   Pulse: (!) 47   SpO2: 98%      Physical Exam  Vitals reviewed. Constitutional:       General: He is not in acute distress.      Appearance: Normal appearance. He is well-developed and normal weight. He is not ill-appearing. HENT:      Head: Normocephalic and atraumatic. Eyes:      General: No scleral icterus. Right eye: No discharge. Left eye: No discharge. Cardiovascular:      Rate and Rhythm: Regular rhythm. Bradycardia present. Pulses: Normal pulses. Heart sounds: Normal heart sounds. No murmur heard. No friction rub. Pulmonary:      Effort: Pulmonary effort is normal. No respiratory distress. Breath sounds: Normal breath sounds. No stridor. No wheezing. Musculoskeletal:      Cervical back: Normal range of motion. No rigidity. Right lower leg: No edema. Left lower leg: No edema. Skin:     General: Skin is warm and dry. Neurological:      Mental Status: He is alert and oriented to person, place, and time. Gait: Gait (no cane or walker) normal.   Psychiatric:         Mood and Affect: Mood normal.         Behavior: Behavior normal.         Thought Content: Thought content normal.         Judgment: Judgment normal.           Portions of the record may have been created with voice recognition software. Occasional wrong word or "sound alike" substitutions may have occurred due to the inherent limitations of voice recognition software. Please review the chart carefully and recognize, using context, where substitutions/typographical errors may have occurred.

## 2023-09-27 DIAGNOSIS — E11.9 TYPE 2 DIABETES MELLITUS WITHOUT COMPLICATION, WITHOUT LONG-TERM CURRENT USE OF INSULIN (HCC): ICD-10-CM

## 2023-09-27 RX ORDER — METFORMIN HYDROCHLORIDE 500 MG/1
500 TABLET, EXTENDED RELEASE ORAL 2 TIMES DAILY WITH MEALS
Qty: 180 TABLET | Refills: 2 | Status: SHIPPED | OUTPATIENT
Start: 2023-09-27

## 2023-10-02 ENCOUNTER — REMOTE DEVICE CLINIC VISIT (OUTPATIENT)
Dept: CARDIOLOGY CLINIC | Facility: CLINIC | Age: 83
End: 2023-10-02
Payer: COMMERCIAL

## 2023-10-02 DIAGNOSIS — Z95.0 CARDIAC PACEMAKER IN SITU: Primary | ICD-10-CM

## 2023-10-02 PROCEDURE — 93294 REM INTERROG EVL PM/LDLS PM: CPT | Performed by: INTERNAL MEDICINE

## 2023-10-02 PROCEDURE — 93296 REM INTERROG EVL PM/IDS: CPT | Performed by: INTERNAL MEDICINE

## 2023-10-02 NOTE — PROGRESS NOTES
Results for orders placed or performed in visit on 10/02/23   Cardiac EP device report    Narrative    MDT DC/PM-ACTIVE SYSTEM IS MRI CONDITIONAL  CARELINK TRANSMISSION: BATTERY VOLTAGE ADEQUATE (11.7 YRS). AP-19%, -95% (>40% [email or web address] OFF). ALL AVAILABLE LEAD PARAMETERS WITHIN NORMAL LIMITS. 3 DEVICE CLASSIFIED NSVT EPISDOES, MOST RECENT FOR 6 BEAT NSVT, AVG CL~300MS; SVT ON #2; 9 BEAT NSVT, AVG CL~360MS ON #1. EF-60% (ECHO 3/15/23; NST ORDERED BUT NOT SCHEDULED). 1 AT EPISODE LASTING 4.25 MINS. PT IS NOT ON ANY BETA BLOCKER. TASKED DR. HERNANDEZ. NORMAL DEVICE FUNCTION.  GV

## 2023-10-18 ENCOUNTER — IN-CLINIC DEVICE VISIT (OUTPATIENT)
Dept: CARDIOLOGY CLINIC | Facility: CLINIC | Age: 83
End: 2023-10-18
Payer: COMMERCIAL

## 2023-10-18 DIAGNOSIS — Z95.0 PRESENCE OF CARDIAC PACEMAKER: Primary | ICD-10-CM

## 2023-10-18 PROCEDURE — 93280 PM DEVICE PROGR EVAL DUAL: CPT | Performed by: INTERNAL MEDICINE

## 2023-10-18 NOTE — PROGRESS NOTES
Results for orders placed or performed in visit on 10/18/23   Cardiac EP device report    Narrative    MDT DC/PM-ACTIVE SYSTEM IS MRI CONDITIONAL  DEVICE INTERROGATED IN THE [facility] OFFICE: BATTERY VOLTAGE ADEQUATE (11.6 YRS). AP: 19.1%. : 95.1% (>40%~MVP OFF~AVB). ALL LEAD PARAMETERS WITHIN NORMAL LIMITS. NO SIGNIFICANT HIGH RATE EPISODES  (VT & AT/AF EPISODES PREVIOUSLY ADDRESSED). NO PROGRAMMING CHANGES MADE TO DEVICE PARAMETERS. NORMAL DEVICE FUNCTION.   [id number] [address]

## 2023-10-26 DIAGNOSIS — E11.9 TYPE 2 DIABETES MELLITUS WITHOUT COMPLICATION, WITHOUT LONG-TERM CURRENT USE OF INSULIN (HCC): ICD-10-CM

## 2023-10-26 DIAGNOSIS — E78.2 MIXED HYPERLIPIDEMIA: ICD-10-CM

## 2023-10-26 RX ORDER — GLIMEPIRIDE 4 MG/1
TABLET ORAL
Qty: 90 TABLET | Refills: 3 | Status: SHIPPED | OUTPATIENT
Start: 2023-10-26

## 2023-10-26 RX ORDER — SIMVASTATIN 20 MG
20 TABLET ORAL
Qty: 90 TABLET | Refills: 3 | Status: SHIPPED | OUTPATIENT
Start: 2023-10-26

## 2023-10-31 ENCOUNTER — OFFICE VISIT (OUTPATIENT)
Dept: FAMILY MEDICINE CLINIC | Facility: HOSPITAL | Age: 83
End: 2023-10-31
Payer: COMMERCIAL

## 2023-10-31 VITALS
HEIGHT: 69 IN | WEIGHT: 174.4 LBS | OXYGEN SATURATION: 94 % | DIASTOLIC BLOOD PRESSURE: 82 MMHG | SYSTOLIC BLOOD PRESSURE: 132 MMHG | HEART RATE: 96 BPM | BODY MASS INDEX: 25.83 KG/M2

## 2023-10-31 DIAGNOSIS — I45.2 BIFASCICULAR BLOCK: ICD-10-CM

## 2023-10-31 DIAGNOSIS — Z95.0 HX OF CARDIAC PACEMAKER: ICD-10-CM

## 2023-10-31 DIAGNOSIS — Z13.0 SCREENING FOR IRON DEFICIENCY ANEMIA: ICD-10-CM

## 2023-10-31 DIAGNOSIS — E11.9 TYPE 2 DIABETES MELLITUS WITHOUT COMPLICATION, WITHOUT LONG-TERM CURRENT USE OF INSULIN (HCC): Primary | ICD-10-CM

## 2023-10-31 DIAGNOSIS — E78.2 MIXED HYPERLIPIDEMIA: ICD-10-CM

## 2023-10-31 DIAGNOSIS — I10 BENIGN ESSENTIAL HTN: ICD-10-CM

## 2023-10-31 DIAGNOSIS — N18.30 STAGE 3 CHRONIC KIDNEY DISEASE, UNSPECIFIED WHETHER STAGE 3A OR 3B CKD (HCC): ICD-10-CM

## 2023-10-31 DIAGNOSIS — Z23 ENCOUNTER FOR IMMUNIZATION: ICD-10-CM

## 2023-10-31 LAB — SL AMB POCT HEMOGLOBIN AIC: 7.6 (ref ?–6.5)

## 2023-10-31 PROCEDURE — 83036 HEMOGLOBIN GLYCOSYLATED A1C: CPT | Performed by: STUDENT IN AN ORGANIZED HEALTH CARE EDUCATION/TRAINING PROGRAM

## 2023-10-31 PROCEDURE — G0444 DEPRESSION SCREEN ANNUAL: HCPCS | Performed by: STUDENT IN AN ORGANIZED HEALTH CARE EDUCATION/TRAINING PROGRAM

## 2023-10-31 PROCEDURE — 90662 IIV NO PRSV INCREASED AG IM: CPT

## 2023-10-31 PROCEDURE — 1160F RVW MEDS BY RX/DR IN RCRD: CPT | Performed by: STUDENT IN AN ORGANIZED HEALTH CARE EDUCATION/TRAINING PROGRAM

## 2023-10-31 PROCEDURE — 3079F DIAST BP 80-89 MM HG: CPT | Performed by: STUDENT IN AN ORGANIZED HEALTH CARE EDUCATION/TRAINING PROGRAM

## 2023-10-31 PROCEDURE — 3288F FALL RISK ASSESSMENT DOCD: CPT | Performed by: STUDENT IN AN ORGANIZED HEALTH CARE EDUCATION/TRAINING PROGRAM

## 2023-10-31 PROCEDURE — 1170F FXNL STATUS ASSESSED: CPT | Performed by: STUDENT IN AN ORGANIZED HEALTH CARE EDUCATION/TRAINING PROGRAM

## 2023-10-31 PROCEDURE — G0008 ADMIN INFLUENZA VIRUS VAC: HCPCS

## 2023-10-31 PROCEDURE — 99214 OFFICE O/P EST MOD 30 MIN: CPT | Performed by: STUDENT IN AN ORGANIZED HEALTH CARE EDUCATION/TRAINING PROGRAM

## 2023-10-31 PROCEDURE — 3725F SCREEN DEPRESSION PERFORMED: CPT | Performed by: STUDENT IN AN ORGANIZED HEALTH CARE EDUCATION/TRAINING PROGRAM

## 2023-10-31 PROCEDURE — G0439 PPPS, SUBSEQ VISIT: HCPCS | Performed by: STUDENT IN AN ORGANIZED HEALTH CARE EDUCATION/TRAINING PROGRAM

## 2023-10-31 PROCEDURE — 3075F SYST BP GE 130 - 139MM HG: CPT | Performed by: STUDENT IN AN ORGANIZED HEALTH CARE EDUCATION/TRAINING PROGRAM

## 2023-10-31 PROCEDURE — 1159F MED LIST DOCD IN RCRD: CPT | Performed by: STUDENT IN AN ORGANIZED HEALTH CARE EDUCATION/TRAINING PROGRAM

## 2023-10-31 PROCEDURE — 1125F AMNT PAIN NOTED PAIN PRSNT: CPT | Performed by: STUDENT IN AN ORGANIZED HEALTH CARE EDUCATION/TRAINING PROGRAM

## 2023-10-31 NOTE — PROGRESS NOTES
Assessment and Plan:      Diagnosis ICD-10-CM Associated Orders   1. Type 2 diabetes mellitus without complication, without long-term current use of insulin (HCC)  E11.9 POCT hemoglobin A1c     Comprehensive metabolic panel     Albumin / creatinine urine ratio     HEMOGLOBIN A1C W/ EAG ESTIMATION     Comprehensive metabolic panel     Albumin / creatinine urine ratio     HEMOGLOBIN A1C W/ EAG ESTIMATION      2. Stage 3 chronic kidney disease, unspecified whether stage 3a or 3b CKD (HCC)  N18.30       3. Mixed hyperlipidemia  E78.2 Lipid Panel with Direct LDL reflex     Lipid Panel with Direct LDL reflex      4. BMI 25.0-25.9,adult  Z68.25       5. Benign essential HTN  I10       6. Bifascicular block  I45.2       7. Hx of cardiac pacemaker  Z95.0       8. Encounter for immunization  Z23 influenza vaccine, high-dose, PF 0.7 mL (FLUZONE HIGH-DOSE)      9. Screening for iron deficiency anemia  Z13.0 CBC and differential     CBC and differential        A1c 7.6 today. No acute concerns or med changes. Check sugar if shaky. Slow transitions when bending over or getting up fast.   BP better on repeat. No med changes. C/W cardio as scheduled. Main labs done in Spring this yr. Will repeat next time. Flu shot given today. Recommend RSV if interested through pharmacy. Depression Screening and Follow-up Plan: Patient was screened for depression during today's encounter. They screened negative with a PHQ-2 score of 0. Preventive health issues were discussed with patient, and age appropriate screening tests were ordered as noted in patient's After Visit Summary. Personalized health advice and appropriate referrals for health education or preventive services given if needed, as noted in patient's After Visit Summary. History of Present Illness:     Patient presents for a Medicare Wellness Visit    HPI   Just had birthday. Not sick recently. Feeling well. Taking all of his meds.    Last cardio appt in July.     Wt Readings from Last 3 Encounters:   10/31/23 79.1 kg (174 lb 6.4 oz)   09/26/23 78 kg (172 lb)   07/06/23 79.7 kg (175 lb 9.6 oz)     Patient Care Team:  Alex Toure DO as PCP - General (Family Medicine)     Review of Systems:     Review of Systems   Constitutional:  Negative for chills and fever. Eyes:  Negative for photophobia and visual disturbance. Respiratory:  Negative for cough and shortness of breath. Cardiovascular:  Negative for chest pain, palpitations and leg swelling. Neurological:  Positive for light-headedness (and shaky at times). Negative for dizziness and headaches. Problem List:     Patient Active Problem List   Diagnosis    Mixed hyperlipidemia    Type 2 diabetes mellitus without complication, without long-term current use of insulin (HCC)    Nasal sinus congestion    BMI 25.0-25.9,adult    Stage 3 chronic kidney disease, unspecified whether stage 3a or 3b CKD ([address])    Benign essential HTN    Bifascicular block    Complete heart block ([address])    Hx of cardiac pacemaker      Past Medical and Surgical History:     History reviewed. No pertinent past medical history. Past Surgical History:   Procedure Laterality Date    CARDIAC ELECTROPHYSIOLOGY PROCEDURE N/A 3/16/2023    Procedure: Cardiac pacer implant;  Surgeon: Jose Valle MD;  Location: [facility]; Service: Cardiology    NO PAST SURGERIES        Family History:     Family History   Problem Relation Age of Onset    Diabetes Sister     Diabetes Brother     Colon cancer Neg Hx     Colon polyps Neg Hx       Social History:     Social History     Socioeconomic History    Marital status:       Spouse name: None    Number of children: None    Years of education: None    Highest education level: None   Occupational History    None   Tobacco Use    Smoking status: Never    Smokeless tobacco: Never   Vaping Use    Vaping Use: Never used   Substance and Sexual Activity    Alcohol use: Not Currently    Drug use: Never    Sexual activity: Not Currently   Other Topics Concern    None   Social History Narrative          Social Determinants of Health     Financial Resource Strain: Low Risk  (10/31/2023)    Overall Financial Resource Strain (CARDIA)     Difficulty of Paying Living Expenses: Not hard at all   Food Insecurity: No Food Insecurity (3/17/2023)    Hunger Vital Sign     Worried About Running Out of Food in the Last Year: Never true     Ran Out of Food in the Last Year: Never true   Transportation Needs: No Transportation Needs (10/31/2023)    PRAPARE - Transportation     Lack of Transportation (Medical): No     Lack of Transportation (Non-Medical): No   Physical Activity: Not on file   Stress: Not on file   Social Connections: Not on file   Intimate Partner Violence: Not on file   Housing Stability: Low Risk  (3/17/2023)    Housing Stability Vital Sign     Unable to Pay for Housing in the Last Year: No     Number of Places Lived in the Last Year: 1     Unstable Housing in the Last Year: No      Medications and Allergies:     Current Outpatient Medications   Medication Sig Dispense Refill    amLODIPine (NORVASC) 5 mg tablet take 1 tablet by mouth once daily 90 tablet 1    fluticasone (FLONASE) 50 mcg/act nasal spray 1 spray into each nostril daily 16 g 2    glimepiride (AMARYL) 4 mg tablet take 1 tablet by mouth every morning before BREAKFAST OR FIRST MAIN MEAL OF THE DAY 90 tablet 3    lisinopril (ZESTRIL) 10 mg tablet take 1 tablet by mouth once daily 90 tablet 3    metFORMIN (GLUCOPHAGE-XR) 500 mg 24 hr tablet take 1 tablet by mouth twice a day with meals 180 tablet 2    multivitamin (THERAGRAN) TABS Take 1 tablet by mouth daily      simvastatin (ZOCOR) 20 mg tablet take 1 tablet by mouth at bedtime 90 tablet 3     No current facility-administered medications for this visit.      No Known Allergies   Immunizations:     Immunization History   Administered Date(s) Administered    COVID-19 PFIZER VACCINE 0.3 ML IM 04/06/2021, 04/26/2021    Influenza, high dose seasonal 0.7 mL 09/14/2020, 10/04/2021, 10/12/2022    Influenza, injectable, quadrivalent, preservative free 0.5 mL 10/18/2007, 09/17/2008, 10/15/2009, 09/24/2010, 10/05/2011, 09/15/2012, 10/17/2013, 10/08/2014, 09/28/2015, 12/06/2016, 11/13/2017, 12/03/2018, 12/03/2019    Pneumococcal Conjugate 13-Valent 04/14/2015    Pneumococcal Polysaccharide PPV23 08/03/2007    Td (adult), adsorbed 01/01/1999    Tuberculin Skin Test-PPD Intradermal 12/06/2016      Health Maintenance: There are no preventive care reminders to display for this patient. Topic Date Due    COVID-19 Vaccine (3 - Pfizer series) 06/21/2021    Influenza Vaccine (1) 09/01/2023      Medicare Screening Tests and Risk Assessments:     Deni Martel is here for his Subsequent Wellness visit. Health Risk Assessment:   Patient rates overall health as very good. Patient feels that their physical health rating is much better. Patient is satisfied with their life. Eyesight was rated as same. Hearing was rated as same. Patient feels that their emotional and mental health rating is same. Patients states they are never, rarely angry. Patient states they are never, rarely unusually tired/fatigued. Pain experienced in the last 7 days has been none. Patient states that he has experienced no weight loss or gain in last 6 months. Depression Screening:   PHQ-2 Score: 0      Fall Risk Screening: In the past year, patient has experienced: no history of falling in past year      Home Safety:  Patient does not have trouble with stairs inside or outside of their home. Patient has working smoke alarms and has working carbon monoxide detector. Home safety hazards include: none. Nutrition:   Current diet is Diabetic. Medications:   Patient is currently taking over-the-counter supplements. OTC medications include: see medication list. Patient is able to manage medications.      Activities of Daily Living (ADLs)/Instrumental Activities of Daily Living (IADLs):   Walk and transfer into and out of bed and chair?: Yes  Dress and groom yourself?: Yes    Bathe or shower yourself?: Yes    Feed yourself? Yes  Do your laundry/housekeeping?: Yes  Manage your money, pay your bills and track your expenses?: Yes  Make your own meals?: Yes    Do your own shopping?: Yes    Previous Hospitalizations:   Any hospitalizations or ED visits within the last 12 months?: No      Advance Care Planning:   Living will: Yes    Durable POA for healthcare: Yes    Advanced directive: Yes      Cognitive Screening:   Provider or family/friend/caregiver concerned regarding cognition?: No    PREVENTIVE SCREENINGS      Cardiovascular Screening:    General: History Lipid Disorder and Screening Current      Diabetes Screening:     General: History Diabetes and Screening Current      Colorectal Cancer Screening:     General: Screening Not Indicated      Prostate Cancer Screening:    General: Screening Not Indicated      Abdominal Aortic Aneurysm (AAA) Screening:        General: Screening Not Indicated      Lung Cancer Screening:     General: Screening Not Indicated    Screening, Brief Intervention, and Referral to Treatment (SBIRT)    Screening      Single Item Drug Screening:  How often have you used an illegal drug (including marijuana) or a prescription medication for non-medical reasons in the past year? never    Single Item Drug Screen Score: 0  Interpretation: Negative screen for possible drug use disorder    Annual Depression Screening  Time spent screening and evaluating the patient for depression during today's encounter was 5 minutes. Other Counseling Topics:   Car/seat belt/driving safety, skin self-exam, sunscreen and calcium and vitamin D intake and regular weightbearing exercise.       Physical Exam:     /82   Pulse 96   Ht 5' 9" (1.753 m)   Wt 79.1 kg (174 lb 6.4 oz)   SpO2 94%   BMI 25.75 kg/m²     Physical Exam  Vitals reviewed. Constitutional:       Appearance: Normal appearance. He is normal weight. HENT:      Head: Normocephalic and atraumatic. Cardiovascular:      Rate and Rhythm: Normal rate and regular rhythm. Pulses: Normal pulses. Heart sounds: Normal heart sounds. No murmur heard. Pulmonary:      Effort: Pulmonary effort is normal. No respiratory distress. Breath sounds: Normal breath sounds. Musculoskeletal:      Cervical back: Normal range of motion and neck supple. Right lower leg: No edema. Left lower leg: No edema. Neurological:      Mental Status: He is alert and oriented to person, place, and time. Gait: Gait normal.   Psychiatric:         Mood and Affect: Mood normal.         Behavior: Behavior normal.         Thought Content:  Thought content normal.         Judgment: Judgment normal.        Theodore Perez,

## 2023-10-31 NOTE — PATIENT INSTRUCTIONS
Check BP or sugars when feeling " off" or jittery. Try to take note of when it happens? Always after a meal? Or with bending over. A1c 7.6 today. No acute concerns. Main labs done in Spring this yr. Will repeat next time. Flu shot given today. Recommend RSV if interested through pharmacy. 2 weeks from now or after. Medicare Preventive Visit Patient Instructions  Thank you for completing your Welcome to Medicare Visit or Medicare Annual Wellness Visit today. Your next wellness visit will be due in one year (10/31/2024). The screening/preventive services that you may require over the next 5-10 years are detailed below. Some tests may not apply to you based off risk factors and/or age. Screening tests ordered at today's visit but not completed yet may show as past due. Also, please note that scanned in results may not display below. Preventive Screenings:  Service Recommendations Previous Testing/Comments   Colorectal Cancer Screening  Colonoscopy    Fecal Occult Blood Test (FOBT)/Fecal Immunochemical Test (FIT)  Fecal DNA/Cologuard Test  Flexible Sigmoidoscopy Age: 43-73 years old   Colonoscopy: every 10 years (May be performed more frequently if at higher risk)  OR  FOBT/FIT: every 1 year  OR  Cologuard: every 3 years  OR  Sigmoidoscopy: every 5 years  Screening may be recommended earlier than age 39 if at higher risk for colorectal cancer. Also, an individualized decision between you and your healthcare provider will decide whether screening between the ages of 77-80 would be appropriate.  Colonoscopy: Not on file  FOBT/FIT: Not on file  Cologuard: Not on file  Sigmoidoscopy: Not on file          Prostate Cancer Screening Individualized decision between patient and health care provider in men between ages of 53-66   Medicare will cover every 12 months beginning on the day after your 50th birthday PSA: No results in last 5 years     Screening Not Indicated     Hepatitis C Screening Once for adults born between [address] [facility]  More frequently in patients at high risk for Hepatitis C Hep C Antibody: Not on file        Diabetes Screening 1-2 times per year if you're at risk for diabetes or have pre-diabetes Fasting glucose: No results in last 5 years (No results in last 5 years)  A1C: 7.3 (7/7/2023)  Screening Not Indicated  History Diabetes   Cholesterol Screening Once every 5 years if you don't have a lipid disorder. May order more often based on risk factors. Lipid panel: 04/03/2023  Screening Not Indicated  History Lipid Disorder      Other Preventive Screenings Covered by Medicare:  Abdominal Aortic Aneurysm (AAA) Screening: covered once if your at risk. You're considered to be at risk if you have a family history of AAA or a male between the age of 70-76 who smoking at least 100 cigarettes in your lifetime. Lung Cancer Screening: covers low dose CT scan once per year if you meet all of the following conditions: (1) Age 48-67; (2) No signs or symptoms of lung cancer; (3) Current smoker or have quit smoking within the last 15 years; (4) You have a tobacco smoking history of at least 20 pack years (packs per day x number of years you smoked); (5) You get a written order from a healthcare provider. Glaucoma Screening: covered annually if you're considered high risk: (1) You have diabetes OR (2) Family history of glaucoma OR (3)  aged 48 and older OR (3)  American aged 72 and older  Osteoporosis Screening: covered every 2 years if you meet one of the following conditions: (1) Have a vertebral abnormality; (2) On glucocorticoid therapy for more than 3 months; (3) Have primary hyperparathyroidism; (4) On osteoporosis medications and need to assess response to drug therapy. HIV Screening: covered annually if you're between the age of 14-79. Also covered annually if you are younger than 13 and older than 72 with risk factors for HIV infection.  For pregnant patients, it is covered up to 3 times per pregnancy. Immunizations:  Immunization Recommendations   Influenza Vaccine Annual influenza vaccination during flu season is recommended for all persons aged >= 6 months who do not have contraindications   Pneumococcal Vaccine   * Pneumococcal conjugate vaccine = PCV13 (Prevnar 13), PCV15 (Vaxneuvance), PCV20 (Prevnar 20)  * Pneumococcal polysaccharide vaccine = PPSV23 (Pneumovax) Adults 56-95 yo with certain risk factors or if 69+ yo  If never received any pneumonia vaccine: recommend Prevnar 20 (PCV20)  Give PCV20 if previously received 1 dose of PCV13 or PPSV23   Hepatitis B Vaccine 3 dose series if at intermediate or high risk (ex: diabetes, end stage renal disease, liver disease)   Respiratory syncytial virus (RSV) Vaccine - COVERED BY MEDICARE PART D  * RSVPreF3 (Arexvy) CDC recommends that adults 61years of age and older may receive a single dose of RSV vaccine using shared clinical decision-making (SCDM)   Tetanus (Td) Vaccine - COST NOT COVERED BY MEDICARE PART B Following completion of primary series, a booster dose should be given every 10 years to maintain immunity against tetanus. Td may also be given as tetanus wound prophylaxis. Tdap Vaccine - COST NOT COVERED BY MEDICARE PART B Recommended at least once for all adults. For pregnant patients, recommended with each pregnancy. Shingles Vaccine (Shingrix) - COST NOT COVERED BY MEDICARE PART B  2 shot series recommended in those 19 years and older who have or will have weakened immune systems or those 50 years and older     Health Maintenance Due:  There are no preventive care reminders to display for this patient. Immunizations Due:      Topic Date Due    COVID-19 Vaccine (3 - Pfizer series) 06/21/2021    Influenza Vaccine (1) 09/01/2023     Advance Directives   What are advance directives? Advance directives are legal documents that state your wishes and plans for medical care.  These plans are made ahead of time in case you lose your ability to make decisions for yourself. Advance directives can apply to any medical decision, such as the treatments you want, and if you want to donate organs. What are the types of advance directives? There are many types of advance directives, and each state has rules about how to use them. You may choose a combination of any of the following:  Living will: This is a written record of the treatment you want. You can also choose which treatments you do not want, which to limit, and which to stop at a certain time. This includes surgery, medicine, IV fluid, and tube feedings. Durable power of  for [facility]): This is a written record that states who you want to make healthcare choices for you when you are unable to make them for yourself. This person, called a proxy, is usually a family member or a friend. You may choose more than 1 proxy. Do not resuscitate (DNR) order:  A DNR order is used in case your heart stops beating or you stop breathing. It is a request not to have certain forms of treatment, such as CPR. A DNR order may be included in other types of advance directives. Medical directive: This covers the care that you want if you are in a coma, near death, or unable to make decisions for yourself. You can list the treatments you want for each condition. Treatment may include pain medicine, surgery, blood transfusions, dialysis, IV or tube feedings, and a ventilator (breathing machine). Values history: This document has questions about your views, beliefs, and how you feel and think about life. This information can help others choose the care that you would choose. Why are advance directives important? An advance directive helps you control your care. Although spoken wishes may be used, it is better to have your wishes written down. Spoken wishes can be misunderstood, or not followed. Treatments may be given even if you do not want them.  An advance directive may make it easier for your family to make difficult choices about your care. Weight Management   Why it is important to manage your weight:  Being overweight increases your risk of health conditions such as heart disease, high blood pressure, type 2 diabetes, and certain types of cancer. It can also increase your risk for osteoarthritis, sleep apnea, and other respiratory problems. Aim for a slow, steady weight loss. Even a small amount of weight loss can lower your risk of health problems. How to lose weight safely:  A safe and healthy way to lose weight is to eat fewer calories and get regular exercise. You can lose up about 1 pound a week by decreasing the number of calories you eat by 500 calories each day. Healthy meal plan for weight management:  A healthy meal plan includes a variety of foods, contains fewer calories, and helps you stay healthy. A healthy meal plan includes the following:  Eat whole-grain foods more often. A healthy meal plan should contain fiber. Fiber is the part of grains, fruits, and vegetables that is not broken down by your body. Whole-grain foods are healthy and provide extra fiber in your diet. Some examples of whole-grain foods are whole-wheat breads and pastas, oatmeal, brown rice, and bulgur. Eat a variety of vegetables every day. Include dark, leafy greens such as spinach, kale, xenia greens, and mustard greens. Eat yellow and orange vegetables such as carrots, sweet potatoes, and winter squash. Eat a variety of fruits every day. Choose fresh or canned fruit (canned in its own juice or light syrup) instead of juice. Fruit juice has very little or no fiber. Eat low-fat dairy foods. Drink fat-free (skim) milk or 1% milk. Eat fat-free yogurt and low-fat cottage cheese. Try low-fat cheeses such as mozzarella and other reduced-fat cheeses. Choose meat and other protein foods that are low in fat. Choose beans or other legumes such as split peas or lentils.  Choose fish, skinless poultry (chicken or turkey), or lean cuts of red meat (beef or pork). Before you cook meat or poultry, cut off any visible fat. Use less fat and oil. Try baking foods instead of frying them. Add less fat, such as margarine, sour cream, regular salad dressing and mayonnaise to foods. Eat fewer high-fat foods. Some examples of high-fat foods include french fries, doughnuts, ice cream, and cakes. Eat fewer sweets. Limit foods and drinks that are high in sugar. This includes candy, cookies, regular soda, and sweetened drinks. Exercise:  Exercise at least 30 minutes per day on most days of the week. Some examples of exercise include walking, biking, dancing, and swimming. You can also fit in more physical activity by taking the stairs instead of the elevator or parking farther away from stores. Ask your healthcare provider about the best exercise plan for you. © Copyright [employer] 2018 Information is for End User's use only and may not be sold, redistributed or otherwise used for commercial purposes.  All illustrations and images included in CareNotes® are the copyrighted property of [employer] or [address] [place] [address]

## 2023-12-13 ENCOUNTER — OFFICE VISIT (OUTPATIENT)
Dept: FAMILY MEDICINE CLINIC | Facility: HOSPITAL | Age: 83
End: 2023-12-13
Payer: COMMERCIAL

## 2023-12-13 VITALS
DIASTOLIC BLOOD PRESSURE: 78 MMHG | HEIGHT: 69 IN | WEIGHT: 174 LBS | SYSTOLIC BLOOD PRESSURE: 142 MMHG | HEART RATE: 79 BPM | BODY MASS INDEX: 25.77 KG/M2 | OXYGEN SATURATION: 97 %

## 2023-12-13 DIAGNOSIS — I10 BENIGN ESSENTIAL HTN: ICD-10-CM

## 2023-12-13 DIAGNOSIS — R61 DIAPHORESIS: ICD-10-CM

## 2023-12-13 DIAGNOSIS — N18.30 STAGE 3 CHRONIC KIDNEY DISEASE, UNSPECIFIED WHETHER STAGE 3A OR 3B CKD (HCC): ICD-10-CM

## 2023-12-13 DIAGNOSIS — E16.2 HYPOGLYCEMIA: ICD-10-CM

## 2023-12-13 DIAGNOSIS — E78.2 MIXED HYPERLIPIDEMIA: ICD-10-CM

## 2023-12-13 DIAGNOSIS — E11.9 TYPE 2 DIABETES MELLITUS WITHOUT COMPLICATION, WITHOUT LONG-TERM CURRENT USE OF INSULIN (HCC): Primary | ICD-10-CM

## 2023-12-13 LAB — SL AMB POCT HEMOGLOBIN AIC: 7.9 (ref ?–6.5)

## 2023-12-13 PROCEDURE — 83036 HEMOGLOBIN GLYCOSYLATED A1C: CPT | Performed by: STUDENT IN AN ORGANIZED HEALTH CARE EDUCATION/TRAINING PROGRAM

## 2023-12-13 PROCEDURE — 99214 OFFICE O/P EST MOD 30 MIN: CPT | Performed by: STUDENT IN AN ORGANIZED HEALTH CARE EDUCATION/TRAINING PROGRAM

## 2023-12-13 RX ORDER — METFORMIN HYDROCHLORIDE 500 MG/1
500 TABLET, EXTENDED RELEASE ORAL 2 TIMES DAILY WITH MEALS
Qty: 180 TABLET | Refills: 2 | Status: SHIPPED | OUTPATIENT
Start: 2023-12-13

## 2023-12-13 NOTE — PROGRESS NOTES
[address], DO    Assessment/Plan:      Diagnosis ICD-10-CM Associated Orders   1. Type 2 diabetes mellitus without complication, without long-term current use of insulin (HCC)  E11.9 metFORMIN (GLUCOPHAGE-XR) 500 mg 24 hr tablet     POCT hemoglobin A1c      2. Stage 3 chronic kidney disease, unspecified whether stage 3a or 3b CKD (HCC)  N18.30       3. Mixed hyperlipidemia  E78.2       4. BMI 25.0-25.9,adult  Z68.25       5. Benign essential HTN  I10       6. Diaphoresis  R61 POCT hemoglobin A1c      7. Hypoglycemia  E16.2 POCT hemoglobin A1c        A1c in office today. Check BP and sugar when feeling sweaty, faint, more tired. Pls try to structure more frequent meals & snacks. ESPECIALLY before EXERTION. A1c 7.9 today. No med changes, but return if not improving with more structured meals. Hypoglycemia handout given. Return in about 3 months (around 3/13/2024), or if symptoms worsen or fail to improve - Move Feb appt further out, do labs prior. Patient may call or return to office with any questions or concerns. ______________________________________________________________________  Subjective:     Patient ID: Lu Hawkins is a 81 y.o. male. HPI  Lu Hawkins  Chief Complaint   Patient presents with    Hot Flashes    Fatigue     When he starts moving around doing things, he feels weak & get sweaty on forehead. "Son says pt is not eating like he should."   Some skipped meals. Sometimes breakfast only. Will take some occas snacks like peanuts. Feels bad if he gets active & picks things up. Feeling tired the past few weeks. Taking both metformin doses & the amaryl in the morning. Last A1c 7.6 in Oct.    Son checked his sugar last night 102. Taking BP meds as well. Higher today 160/90. Home BP - 142/70.      Wt Readings from Last 3 Encounters:   12/13/23 78.9 kg (174 lb)   10/31/23 79.1 kg (174 lb 6.4 oz)   09/26/23 78 kg (172 lb)     Temp Readings from Last 3 Encounters:   03/17/23 97.9 °F (36.6 °C)   01/03/23 98.2 °F (36.8 °C)     BP Readings from Last 3 Encounters:   12/13/23 142/78   10/31/23 132/82   09/26/23 148/80     Pulse Readings from Last 3 Encounters:   12/13/23 79   10/31/23 96   09/26/23 (!) 47      The following portions of the patient's history were reviewed and updated as appropriate: allergies, current medications, past medical history, and problem list.    Review of Systems   Constitutional:  Positive for diaphoresis. Negative for chills and fever. HENT:  Negative for rhinorrhea and sore throat. Respiratory:  Negative for cough and shortness of breath. Cardiovascular:  Negative for chest pain, palpitations and leg swelling. Gastrointestinal:  Negative for diarrhea, nausea and vomiting. Neurological:  Negative for dizziness, light-headedness and headaches. Objective:      Vitals:    12/13/23 1446   BP: 142/78   Pulse:    SpO2:       Physical Exam  Vitals and nursing note reviewed. Constitutional:       General: He is not in acute distress. Appearance: Normal appearance. He is normal weight. He is not ill-appearing. HENT:      Head: Normocephalic and atraumatic. Eyes:      General: No scleral icterus. Right eye: No discharge. Left eye: No discharge. Neck:      Vascular: No carotid bruit. Cardiovascular:      Rate and Rhythm: Normal rate. Rhythm irregular. Pulses: Normal pulses. Heart sounds: Normal heart sounds. No murmur heard. Pulmonary:      Effort: Pulmonary effort is normal. No respiratory distress. Breath sounds: Normal breath sounds. No stridor. No wheezing. Musculoskeletal:      Cervical back: Normal range of motion and neck supple. No rigidity or tenderness. Right lower leg: No edema. Left lower leg: No edema. Neurological:      Mental Status: He is alert and oriented to person, place, and time.       Gait: Gait normal.   Psychiatric:         Mood and Affect: Mood normal.         Behavior: Behavior normal.         Thought Content: Thought content normal.         Judgment: Judgment normal.         Portions of the record may have been created with voice recognition software. Occasional wrong word or "sound alike" substitutions may have occurred due to the inherent limitations of voice recognition software. Please review the chart carefully and recognize, using context, where substitutions/typographical errors may have occurred.

## 2023-12-13 NOTE — PATIENT INSTRUCTIONS
Check BP and sugar when feeling sweaty, faint, more tired. Pls try to structure more frequent meals & snacks. ESPECIALLY before EXERTION. A1c 7.9 today. No med changes, but return if not improving with more structured meals.

## 2023-12-15 DIAGNOSIS — E11.9 TYPE 2 DIABETES MELLITUS WITHOUT COMPLICATION, WITHOUT LONG-TERM CURRENT USE OF INSULIN (HCC): Primary | ICD-10-CM

## 2023-12-19 LAB
LEFT EYE DIABETIC RETINOPATHY: NORMAL
RIGHT EYE DIABETIC RETINOPATHY: NORMAL

## 2023-12-25 DIAGNOSIS — R03.0 ELEVATED BP WITHOUT DIAGNOSIS OF HYPERTENSION: ICD-10-CM

## 2023-12-25 RX ORDER — AMLODIPINE BESYLATE 5 MG/1
TABLET ORAL
Qty: 90 TABLET | Refills: 1 | Status: SHIPPED | OUTPATIENT
Start: 2023-12-25

## 2024-01-09 ENCOUNTER — OFFICE VISIT (OUTPATIENT)
Dept: DIABETES SERVICES | Facility: HOSPITAL | Age: 84
End: 2024-01-09
Payer: COMMERCIAL

## 2024-01-09 VITALS — WEIGHT: 175 LBS | BODY MASS INDEX: 25.92 KG/M2 | HEIGHT: 69 IN

## 2024-01-09 DIAGNOSIS — E11.9 TYPE 2 DIABETES MELLITUS WITHOUT COMPLICATION, WITHOUT LONG-TERM CURRENT USE OF INSULIN (HCC): Primary | ICD-10-CM

## 2024-01-09 PROCEDURE — 97802 MEDICAL NUTRITION INDIV IN: CPT | Performed by: DIETITIAN, REGISTERED

## 2024-01-09 NOTE — PROGRESS NOTES
"Medical Nutrition Therapy     Assessment    Visit Type: Initial visit  Chief complaint:  Type 2 Diabetes     HPI:  Met with Pipo for initial MNT.  States diabetes for a number of years, A1c has been under good control but has been climbing recently, 7.9%.  Not testing blood sugars at home on a regular basis, states he knows how to use the machine but does not know what the numbers should be, we reviewed those together today.  He has an upcoming appointment with the endocrinologist in a few weeks.  Scheduled a linked appointment between myself and endocrinology for me to train him and set up his freestyle deniz that he recently got. Food recall shows primary issues: Previously was only eating 1 meal a day and then going long periods of time without eating anything.  He has been working on changing that recently.  Now he is eating 2-3 meals a day with some snacks in between.  Many of his meals do not have any carbohydrate, but then he snacks on fruit in between meals.  Discussed having most carbs at his meal and snacks in between being low carbohydrate foods.  I think eating consistently throughout the day will help him start to feel better.  I think he will see a benefit of wearing the sensor and that we will help him to see how food affects his blood sugar as well as seeing if his blood sugars are low or high when he does not feel well throughout the day.  Together we discussed what foods contain CHO, reading a food label, serving sizes, and set a carb goal of 30-45g CHO/meal to promote weight loss with 15g snacks. Put together sample meals for Pipo's reference and evaluated Pipo's current eating plan. Good understanding, Pipo will call with questions or for more education. Follow up as needed if not improving.      Ht Readings from Last 1 Encounters:   01/09/24 5' 9\" (1.753 m)     Wt Readings from Last 3 Encounters:   01/09/24 79.4 kg (175 lb)   12/13/23 78.9 kg (174 lb)   10/31/23 79.1 kg (174 lb 6.4 oz) " "       Body mass index is 25.84 kg/m².     Lab Results   Component Value Date    HGBA1C 7.9 (A) 12/13/2023    HGBA1C 7.6 (A) 10/31/2023    HGBA1C 7.3 (A) 07/07/2023       No results found for: \"CHOL\"  Lab Results   Component Value Date    HDL 53 04/03/2023    HDL 48 06/17/2022     Lab Results   Component Value Date    LDLCALC 74 04/03/2023    LDLCALC 99 06/17/2022     Lab Results   Component Value Date    TRIG 113 04/03/2023    TRIG 130 06/17/2022     No results found for: \"CHOLHDL\"    Weight Change: No    Medical Diagnosis/ICD 10 Code:  E11.9    Barriers to Learning: no barriers    Do you follow any special diet presently?: No  Who shops: patient  Who cooks: patient    Food Log: Completed via the method of food recall- lives with son. Retired,     Breakfast:up around 8-9am now, was 7am: breakfast shortly after: eggs omeletes with greens in it and holt no toast now.   Morning Snack: watches tv, used to go walking on the trail but not as much now, will have fruit and nuts 11am before the walk.   Lunch:12-1pm: sandwich sometimes a few chips with water, juice without sugar in a can or sparkling water or OJ,   Afternoon Snack: not much fruit 3pm  Dinner:7-8pm: has an air fryer, steak, beef stew, chicken soup, will make a full meal sometimes. Not much pasta, been staying away from potatoes too.   Evening Snack: ice cream very rare, sugar free pie as dessert right away. Later on might be fruit or yogurt before bed. Goes to bed and watches tv around 12-1am  Beverages: water, OJ with lunch or breakfast, coffee with splenda and cream, no milk, no soda, sugar free drinks   Eating out/Take out: not much   Exercise walks in the morning, every day when the weather is good. 2 miles     Nutrition Diagnosis:  Food and nutrition related knowledge deficit  related to Lack of prior exposure to accurate nutrition related information as evidenced by Verbalizes inaccurate or incomplete information    Intervention: plate method, label " reading, behavior modification strategies, carbohydrate counting, meal timing, meal planning, individualized meal plan, monitoring portion control, and exercise guidelines     Treatment Goals: Patient understands education and recommendations    Education Material Given  Pipo was provided the Portion Booklet and Planning Healthy Meals     Monitoring and evaluation:    Term code indicator  FH 1.6.3 Carbohydrate Intake Criteria: 30-45g CHO per meal, 15g CHO snacks    Patient’s Response to Instruction:  Comprehensiongood  Motivationgood  Expected Compliancegood    Thank you for coming to the [facility] for education today.  Please feel free to call with any questions or concerns.    Zaynab Muro, RD  [address] [id number]

## 2024-01-11 ENCOUNTER — TELEPHONE (OUTPATIENT)
Dept: DIABETES SERVICES | Facility: CLINIC | Age: 84
End: 2024-01-11

## 2024-01-11 NOTE — TELEPHONE ENCOUNTER
Pipo was referred to diabetes education by your office. At his initial appt he asked the educator if he could make an additional appt to start a deniz. Can you enter another referral for a deniz/cgm start? Thank you

## 2024-01-22 ENCOUNTER — REMOTE DEVICE CLINIC VISIT (OUTPATIENT)
Dept: CARDIOLOGY CLINIC | Facility: CLINIC | Age: 84
End: 2024-01-22
Payer: COMMERCIAL

## 2024-01-22 DIAGNOSIS — Z95.0 PRESENCE OF PERMANENT CARDIAC PACEMAKER: Primary | ICD-10-CM

## 2024-01-22 PROCEDURE — 93294 REM INTERROG EVL PM/LDLS PM: CPT | Performed by: INTERNAL MEDICINE

## 2024-01-22 PROCEDURE — 93296 REM INTERROG EVL PM/IDS: CPT | Performed by: INTERNAL MEDICINE

## 2024-01-22 NOTE — PROGRESS NOTES
MDT DC PM - ACTIVE SYSTEM IS MRI CONDITIONAL   CARELINK TRANSMISSION:  BATTERY VOLTAGE ADEQUATE (11.1 YR.).  AP 28.1% -LPF 97.2% (>40%/AVB/DEPENDENT/DDD 60 PPM, -180 MS).  ALL LEAD PARAMETERS WITHIN NORMAL LIMITS.  38 DEVICE CLASSIFIED AT/AF EPISODES, WITH ALL AVAILABLE EGMS SHOWING PACS, FF QRS OVERSENSING ON THE ATRIAL CHANNEL, AND COMPETITIVE PACING.  NO AT/AF PRESENT.  NORMAL DEVICE FUNCTION.  RG

## 2024-01-24 ENCOUNTER — OFFICE VISIT (OUTPATIENT)
Dept: DIABETES SERVICES | Facility: HOSPITAL | Age: 84
End: 2024-01-24
Payer: COMMERCIAL

## 2024-01-24 ENCOUNTER — CONSULT (OUTPATIENT)
Dept: ENDOCRINOLOGY | Facility: HOSPITAL | Age: 84
End: 2024-01-24
Payer: COMMERCIAL

## 2024-01-24 VITALS — BODY MASS INDEX: 25.92 KG/M2 | WEIGHT: 175 LBS | HEIGHT: 69 IN

## 2024-01-24 VITALS
HEIGHT: 69 IN | OXYGEN SATURATION: 98 % | BODY MASS INDEX: 25.77 KG/M2 | DIASTOLIC BLOOD PRESSURE: 68 MMHG | WEIGHT: 174 LBS | SYSTOLIC BLOOD PRESSURE: 128 MMHG | HEART RATE: 70 BPM

## 2024-01-24 DIAGNOSIS — E11.22 TYPE 2 DIABETES MELLITUS WITH STAGE 3A CHRONIC KIDNEY DISEASE, WITHOUT LONG-TERM CURRENT USE OF INSULIN (HCC): ICD-10-CM

## 2024-01-24 DIAGNOSIS — E78.2 MIXED HYPERLIPIDEMIA: Primary | ICD-10-CM

## 2024-01-24 DIAGNOSIS — E11.9 TYPE 2 DIABETES MELLITUS WITHOUT COMPLICATION, WITHOUT LONG-TERM CURRENT USE OF INSULIN (HCC): ICD-10-CM

## 2024-01-24 DIAGNOSIS — N18.30 STAGE 3 CHRONIC KIDNEY DISEASE, UNSPECIFIED WHETHER STAGE 3A OR 3B CKD (HCC): ICD-10-CM

## 2024-01-24 DIAGNOSIS — E11.9 TYPE 2 DIABETES MELLITUS WITHOUT COMPLICATION, WITHOUT LONG-TERM CURRENT USE OF INSULIN (HCC): Primary | ICD-10-CM

## 2024-01-24 DIAGNOSIS — N18.31 TYPE 2 DIABETES MELLITUS WITH STAGE 3A CHRONIC KIDNEY DISEASE, WITHOUT LONG-TERM CURRENT USE OF INSULIN (HCC): ICD-10-CM

## 2024-01-24 PROCEDURE — 95249 CONT GLUC MNTR PT PROV EQP: CPT | Performed by: DIETITIAN, REGISTERED

## 2024-01-24 PROCEDURE — 99204 OFFICE O/P NEW MOD 45 MIN: CPT | Performed by: STUDENT IN AN ORGANIZED HEALTH CARE EDUCATION/TRAINING PROGRAM

## 2024-01-24 RX ORDER — METFORMIN HYDROCHLORIDE 500 MG/1
1500 TABLET, EXTENDED RELEASE ORAL
Qty: 300 TABLET | Refills: 1 | Status: SHIPPED | OUTPATIENT
Start: 2024-01-24

## 2024-01-24 NOTE — PROGRESS NOTES
"Dinorah Personal Training     Met with Pipo Morris for Dinorah training. Pt was instructed on use of device, location, cleaning the area, and insertion. Dinorah  was programmed with customized high and low settings as appropriate. Pipo left my office with the Dinorah on and in warm up mode. Pt was asked to create a [email or web address] account and link to our office so that they can upload from home as needed and we will download the  when in office. Pipo will call with questions or for more education as needed.     Ht Readings from Last 1 Encounters:   01/24/24 5' 9\" (1.753 m)     Wt Readings from Last 3 Encounters:   01/24/24 78.9 kg (174 lb)   01/24/24 79.4 kg (175 lb)   01/09/24 79.4 kg (175 lb)        Body mass index is 25.84 kg/m².     Lab Results   Component Value Date    HGBA1C 7.9 (A) 12/13/2023    HGBA1C 7.6 (A) 10/31/2023    HGBA1C 7.3 (A) 07/07/2023       No results found for: \"CHOL\"  Lab Results   Component Value Date    HDL 53 04/03/2023    HDL 48 06/17/2022     Lab Results   Component Value Date    LDLCALC 74 04/03/2023    LDLCALC 99 06/17/2022     Lab Results   Component Value Date    TRIG 113 04/03/2023    TRIG 130 06/17/2022     No results found for: \"CHOLHDL\"  No results found for: \"MICROALBUR\", \"[id number]\"    Diabetes Education Record  Pipo received the following handouts: none today       Patient response to instruction    Comprehensiongood  Motivationgood  Expected Compliancegood    Thank you for referring your patient to [facility], it was a pleasure working with them today. Please feel free to call with any questions or concerns.    Zaynab Muro, RD  [address] [place] [address]   "

## 2024-01-24 NOTE — TELEPHONE ENCOUNTER
After speaking with Zaynab and the patient a prescription will need to be sent to his local pharmacy.

## 2024-01-24 NOTE — PROGRESS NOTES
"    New Patient Consult Note      Chief Complaint   Patient presents with    Diabetes Type 2      Referring Provider  Connie Villegas Do  [facility]   [address] [place] [id number]     History of Present Illness:   Pipo Morris is a 83 y.o. male with a history of type 2 diabetes Without any complications with other Pmhx of hyperlipidemia and hypertension who presents today for diabetes management.     Patient was first diagnosed with T2DM - 15 years ago  Control since diagnosis: suboptimal with slight recent raise in A1C from 7.6% to 7.9%  Medications tried thus far:below  Current regime:- metformin XR 500mg BID, glimepride 4mg daily in AM  BG control:- doesn't check often   Hypoglycemic episodes:   Hyperglycemia symptoms: no polyuria or polydipsia\",\"no chest pain, dyspnea or TIAs\",\"no numbness, tingling or pain in extremities\"  Diet: reports doesn't watch his diet, saw CDE today  Activity: limited    Opthamology:had exam last month ; no retinopathy, no macular edema  Hx of hyperlipidemia: yes  Hx of hypertension:no  Last Lipid:- 04/23 normal   Last urine microalbumin: 04/23 normal  Last hbA1C: 12/23 7.9%    Social Hx/family hx- reviewed and as below in chart    Patient Active Problem List   Diagnosis    Mixed hyperlipidemia    Type 2 diabetes mellitus without complication, without long-term current use of insulin (HCC)    Nasal sinus congestion    BMI 25.0-25.9,adult    Stage 3 chronic kidney disease, unspecified whether stage 3a or 3b CKD (HCC)    Benign essential HTN    Bifascicular block    Complete heart block (HCC)    Hx of cardiac pacemaker      History reviewed. No pertinent past medical history.   Past Surgical History:   Procedure Laterality Date    CARDIAC ELECTROPHYSIOLOGY PROCEDURE N/A 3/16/2023    Procedure: Cardiac pacer implant;  Surgeon: Malik Partida MD;  Location: [facility];  Service: Cardiology    NO PAST SURGERIES        Family History   Problem Relation Age of Onset    " "Diabetes Sister     Diabetes Brother     Colon cancer Neg Hx     Colon polyps Neg Hx      Social History     Tobacco Use    Smoking status: Never    Smokeless tobacco: Never   Substance Use Topics    Alcohol use: Not Currently     No Known Allergies      Current Outpatient Medications:     amLODIPine (NORVASC) 5 mg tablet, take 1 tablet by mouth once daily, Disp: 90 tablet, Rfl: 1    Continuous Blood Gluc Sensor (FreeStyle Dinorah 2 Sensor) MISC, Use, Disp: , Rfl:     fluticasone (FLONASE) 50 mcg/act nasal spray, 1 spray into each nostril daily, Disp: 16 g, Rfl: 2    glimepiride (AMARYL) 4 mg tablet, take 1 tablet by mouth every morning before BREAKFAST OR FIRST MAIN MEAL OF THE DAY, Disp: 90 tablet, Rfl: 3    lisinopril (ZESTRIL) 10 mg tablet, take 1 tablet by mouth once daily, Disp: 90 tablet, Rfl: 3    metFORMIN (GLUCOPHAGE-XR) 500 mg 24 hr tablet, Take 1 tablet (500 mg total) by mouth 2 (two) times a day with meals, Disp: 180 tablet, Rfl: 2    multivitamin (THERAGRAN) TABS, Take 1 tablet by mouth daily, Disp: , Rfl:     simvastatin (ZOCOR) 20 mg tablet, take 1 tablet by mouth at bedtime, Disp: 90 tablet, Rfl: 3  Review of Systems   Constitutional:  Negative for diaphoresis, fatigue and unexpected weight change.   Respiratory:  Negative for shortness of breath.    Cardiovascular:  Negative for chest pain and palpitations.   Gastrointestinal:  Negative for constipation and diarrhea.   Endocrine: Negative for polydipsia and polyuria.       Physical Exam:  Body mass index is 25.7 kg/m².  /68   Pulse 70   Ht 5' 9\" (1.753 m)   Wt 78.9 kg (174 lb)   SpO2 98%   BMI 25.70 kg/m²    Wt Readings from Last 3 Encounters:   01/24/24 78.9 kg (174 lb)   01/24/24 79.4 kg (175 lb)   01/09/24 79.4 kg (175 lb)       Physical Exam  Constitutional:       Appearance: Normal appearance.   Cardiovascular:      Rate and Rhythm: Normal rate and regular rhythm.      Pulses: Normal pulses. no weak pulses          Dorsalis pedis " "pulses are 2+ on the right side and 2+ on the left side.   Pulmonary:      Effort: Pulmonary effort is normal.   Abdominal:      General: Abdomen is flat.      Palpations: Abdomen is soft.   Feet:      Right foot:      Skin integrity: No ulcer, skin breakdown, erythema, warmth, callus or dry skin.      Left foot:      Skin integrity: No ulcer, skin breakdown, erythema, warmth, callus or dry skin.   Skin:     General: Skin is warm.      Capillary Refill: Capillary refill takes less than 2 seconds.   Neurological:      General: No focal deficit present.      Mental Status: He is alert.       Patient's shoes and socks removed.    Right Foot/Ankle   Right Foot Inspection  Skin Exam: skin normal and skin intact. No dry skin, no warmth, no callus, no erythema, no maceration, no abnormal color, no pre-ulcer, no ulcer and no callus.     Toe Exam: ROM and strength within normal limits.     Sensory   Vibration: intact  Monofilament testing: intact    Vascular  Capillary refills: < 3 seconds  The right DP pulse is 2+.     Left Foot/Ankle  Left Foot Inspection  Skin Exam: skin normal and skin intact. No dry skin, no warmth, no erythema, no maceration, normal color, no pre-ulcer, no ulcer and no callus.     Toe Exam: ROM and strength within normal limits.     Sensory   Vibration: intact  Monofilament testing: intact    Vascular  Capillary refills: < 3 seconds  The left DP pulse is 2+.     Assign Risk Category  No deformity present  No loss of protective sensation  No weak pulses  Risk: 0      Labs:   No components found for: \"HA1C\"  No components found for: \"GLU\"    Lab Results   Component Value Date    CREATININE 1.18 04/03/2023    CREATININE 1.15 03/17/2023    CREATININE 1.22 03/16/2023    BUN 13 04/03/2023    K 4.6 04/03/2023     04/03/2023    CO2 23 04/03/2023     eGFR   Date Value Ref Range Status   04/03/2023 62 >59 mL/min/1.73 Final   03/17/2023 59 ml/min/1.73sq m Final     No components found for: \"MALBCRER\"    Lab " Results   Component Value Date    HDL 53 04/03/2023    TRIG 113 04/03/2023       Lab Results   Component Value Date    ALT 16 04/03/2023    AST 21 04/03/2023    ALKPHOS 69 03/16/2023       Lab Results   Component Value Date    TSH 2.930 06/17/2022       Impression:  1. Type 2 diabetes mellitus without complication, without long-term current use of insulin (HCC)           Plan:    Pipo was seen today for diabetes type 2.    Diagnoses and all orders for this visit:    Type 2 diabetes mellitus without complication, without long-term current use of insulin (HCC)  -     Ambulatory Referral to Endocrinology      1. Type 2 diabetes mellitus without complication, without long-term current use of insulin (HCC)    - Ambulatory Referral to Endocrinology        Patient is a 83yM With PMHx of DM since 15 years ago Without any complications  Who presents today for diabetic care.     1) T2DM:-   Patients last A1C was slightly higher than before but still reasonable for his age, ,most likely age related decline in glycemic control. For now Will slightly increase metformin to 1500mg daily. C/w glimepride 4mg daily. Has deniz c/w using this and can set this up for next visit to review. C/w diet/exercise changes as discussed with CDE. Repeat labs in 3 months     Screening:-   Retinopathy- UTD  Nephropathy- not UTD, Will order for next visit  Lipide levels- last labs 04/23, normal, order for next visit    2) Hyperlipidemia:- c/w lipitor    RTC in 3 months    Discussed with the patient and all questioned fully answered. He will call me if any problems arise.    Counseled patient on diagnostic results, prognosis, risk and benefit of treatment options, instruction for management, importance of treatment compliance, Risk  factor reduction and impressions      Arabella Bedoya MD

## 2024-02-21 DIAGNOSIS — E11.9 TYPE 2 DIABETES MELLITUS WITHOUT COMPLICATION, WITHOUT LONG-TERM CURRENT USE OF INSULIN (HCC): ICD-10-CM

## 2024-02-23 LAB
ALBUMIN SERPL-MCNC: 4.4 G/DL (ref 3.7–4.7)
ALBUMIN/CREAT UR: 9 MG/G CREAT (ref 0–29)
ALBUMIN/GLOB SERPL: 1.5 {RATIO} (ref 1.2–2.2)
ALP SERPL-CCNC: 75 IU/L (ref 44–121)
ALT SERPL-CCNC: 22 IU/L (ref 0–44)
AST SERPL-CCNC: 24 IU/L (ref 0–40)
BASOPHILS # BLD AUTO: 0 X10E3/UL (ref 0–0.2)
BASOPHILS NFR BLD AUTO: 1 %
BILIRUB SERPL-MCNC: 0.6 MG/DL (ref 0–1.2)
BUN SERPL-MCNC: 18 MG/DL (ref 8–27)
BUN/CREAT SERPL: 14 (ref 10–24)
CALCIUM SERPL-MCNC: 9.4 MG/DL (ref 8.6–10.2)
CHLORIDE SERPL-SCNC: 103 MMOL/L (ref 96–106)
CHOLEST SERPL-MCNC: 147 MG/DL (ref 100–199)
CO2 SERPL-SCNC: 21 MMOL/L (ref 20–29)
CREAT SERPL-MCNC: 1.31 MG/DL (ref 0.76–1.27)
CREAT UR-MCNC: 131.5 MG/DL
EGFR: 54 ML/MIN/1.73
EOSINOPHIL # BLD AUTO: 0.1 X10E3/UL (ref 0–0.4)
EOSINOPHIL NFR BLD AUTO: 2 %
ERYTHROCYTE [DISTWIDTH] IN BLOOD BY AUTOMATED COUNT: 10.9 % (ref 11.6–15.4)
EST. AVERAGE GLUCOSE BLD GHB EST-MCNC: 148 MG/DL
GLOBULIN SER-MCNC: 3 G/DL (ref 1.5–4.5)
GLUCOSE SERPL-MCNC: 203 MG/DL (ref 70–99)
HBA1C MFR BLD: 6.8 % (ref 4.8–5.6)
HCT VFR BLD AUTO: 43.6 % (ref 37.5–51)
HDLC SERPL-MCNC: 51 MG/DL
HGB BLD-MCNC: 14.2 G/DL (ref 13–17.7)
IMM GRANULOCYTES # BLD: 0 X10E3/UL (ref 0–0.1)
IMM GRANULOCYTES NFR BLD: 0 %
LDLC SERPL CALC-MCNC: 81 MG/DL (ref 0–99)
LDLC/HDLC SERPL: 1.6 RATIO (ref 0–3.6)
LYMPHOCYTES # BLD AUTO: 1.8 X10E3/UL (ref 0.7–3.1)
LYMPHOCYTES NFR BLD AUTO: 21 %
MCH RBC QN AUTO: 32.4 PG (ref 26.6–33)
MCHC RBC AUTO-ENTMCNC: 32.6 G/DL (ref 31.5–35.7)
MCV RBC AUTO: 100 FL (ref 79–97)
MICROALBUMIN UR-MCNC: 11.7 UG/ML
MONOCYTES # BLD AUTO: 0.7 X10E3/UL (ref 0.1–0.9)
MONOCYTES NFR BLD AUTO: 8 %
NEUTROPHILS # BLD AUTO: 5.9 X10E3/UL (ref 1.4–7)
NEUTROPHILS NFR BLD AUTO: 68 %
PLATELET # BLD AUTO: 261 X10E3/UL (ref 150–450)
POTASSIUM SERPL-SCNC: 4.3 MMOL/L (ref 3.5–5.2)
PROT SERPL-MCNC: 7.4 G/DL (ref 6–8.5)
RBC # BLD AUTO: 4.38 X10E6/UL (ref 4.14–5.8)
SL AMB VLDL CHOLESTEROL CALC: 15 MG/DL (ref 5–40)
SODIUM SERPL-SCNC: 141 MMOL/L (ref 134–144)
TRIGL SERPL-MCNC: 79 MG/DL (ref 0–149)
WBC # BLD AUTO: 8.5 X10E3/UL (ref 3.4–10.8)

## 2024-02-28 ENCOUNTER — OFFICE VISIT (OUTPATIENT)
Dept: FAMILY MEDICINE CLINIC | Facility: HOSPITAL | Age: 84
End: 2024-02-28
Payer: COMMERCIAL

## 2024-02-28 VITALS
BODY MASS INDEX: 25.48 KG/M2 | HEIGHT: 69 IN | DIASTOLIC BLOOD PRESSURE: 70 MMHG | WEIGHT: 172 LBS | SYSTOLIC BLOOD PRESSURE: 142 MMHG | HEART RATE: 57 BPM | OXYGEN SATURATION: 96 %

## 2024-02-28 DIAGNOSIS — E11.22 TYPE 2 DIABETES MELLITUS WITH STAGE 3A CHRONIC KIDNEY DISEASE, WITHOUT LONG-TERM CURRENT USE OF INSULIN (HCC): ICD-10-CM

## 2024-02-28 DIAGNOSIS — E78.2 MIXED HYPERLIPIDEMIA: ICD-10-CM

## 2024-02-28 DIAGNOSIS — I44.2 COMPLETE HEART BLOCK (HCC): ICD-10-CM

## 2024-02-28 DIAGNOSIS — N18.31 STAGE 3A CHRONIC KIDNEY DISEASE (HCC): ICD-10-CM

## 2024-02-28 DIAGNOSIS — R09.81 NASAL SINUS CONGESTION: ICD-10-CM

## 2024-02-28 DIAGNOSIS — N18.31 TYPE 2 DIABETES MELLITUS WITH STAGE 3A CHRONIC KIDNEY DISEASE, WITHOUT LONG-TERM CURRENT USE OF INSULIN (HCC): ICD-10-CM

## 2024-02-28 DIAGNOSIS — I10 BENIGN ESSENTIAL HTN: ICD-10-CM

## 2024-02-28 DIAGNOSIS — E11.9 TYPE 2 DIABETES MELLITUS WITHOUT COMPLICATION, WITHOUT LONG-TERM CURRENT USE OF INSULIN (HCC): Primary | ICD-10-CM

## 2024-02-28 PROCEDURE — 99214 OFFICE O/P EST MOD 30 MIN: CPT | Performed by: STUDENT IN AN ORGANIZED HEALTH CARE EDUCATION/TRAINING PROGRAM
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
## 2024-03-12 ENCOUNTER — TELEPHONE (OUTPATIENT)
Dept: ADMINISTRATIVE | Facility: OTHER | Age: 84
End: 2024-03-12

## 2024-03-12 NOTE — TELEPHONE ENCOUNTER
03/12/24 3:47 PM    Patient contacted (left message) to bring Advance Directive, POLST, or Living Will document to next scheduled pcp visit.    Thank you.  [place] [facility]

## 2024-03-29 DIAGNOSIS — E11.9 TYPE 2 DIABETES MELLITUS WITHOUT COMPLICATION, WITHOUT LONG-TERM CURRENT USE OF INSULIN (HCC): ICD-10-CM

## 2024-04-22 ENCOUNTER — REMOTE DEVICE CLINIC VISIT (OUTPATIENT)
Dept: CARDIOLOGY CLINIC | Facility: CLINIC | Age: 84
End: 2024-04-22
Payer: COMMERCIAL

## 2024-04-22 DIAGNOSIS — Z95.0 CARDIAC PACEMAKER IN SITU: Primary | ICD-10-CM

## 2024-04-22 PROCEDURE — 93296 REM INTERROG EVL PM/IDS: CPT | Performed by: INTERNAL MEDICINE

## 2024-04-22 PROCEDURE — 93294 REM INTERROG EVL PM/LDLS PM: CPT | Performed by: INTERNAL MEDICINE

## 2024-04-22 NOTE — PROGRESS NOTES
Results for orders placed or performed in visit on 04/22/24   Cardiac EP device report    Narrative    MDT DC PM - ACTIVE SYSTEM IS MRI CONDITIONAL  CARELINK TRANSMISSION: BATTERY VOLTAGE ADEQUATE (10.9 YRS). AP-33%, -98% (>40% CHB/DDD@60PPM/MVP OFF). ALL AVAILABLE LEAD PARAMETERS WITHIN NORMAL LIMITS. 1 NSVT EPISODE ON 3/20/24 FOR 8 BEATS, AVG CL~260MS. EF-60% (ECHO 3/15/23; NST ORDERED BUT NOT SCHEDULED). 160 ATAF EPISODES MAX DURATION 9 MINS- PAT & PAC'S ON AVAILABLE EGM'S. AT/AF BURDEN-0.6%. PT IS NOT ON ANY BETA BLOCKER. TASKED DR. HERNANDEZ. NORMAL DEVICE FUNCTION. GV

## 2024-05-01 DIAGNOSIS — E11.9 TYPE 2 DIABETES MELLITUS WITHOUT COMPLICATION, WITHOUT LONG-TERM CURRENT USE OF INSULIN (HCC): ICD-10-CM

## 2024-05-06 ENCOUNTER — OFFICE VISIT (OUTPATIENT)
Dept: ENDOCRINOLOGY | Facility: HOSPITAL | Age: 84
End: 2024-05-06
Payer: COMMERCIAL

## 2024-05-06 VITALS
SYSTOLIC BLOOD PRESSURE: 146 MMHG | DIASTOLIC BLOOD PRESSURE: 74 MMHG | HEART RATE: 70 BPM | BODY MASS INDEX: 25.45 KG/M2 | HEIGHT: 69 IN | WEIGHT: 171.8 LBS

## 2024-05-06 DIAGNOSIS — N18.31 TYPE 2 DIABETES MELLITUS WITH STAGE 3A CHRONIC KIDNEY DISEASE, WITHOUT LONG-TERM CURRENT USE OF INSULIN (HCC): Primary | ICD-10-CM

## 2024-05-06 DIAGNOSIS — N18.30 STAGE 3 CHRONIC KIDNEY DISEASE, UNSPECIFIED WHETHER STAGE 3A OR 3B CKD (HCC): ICD-10-CM

## 2024-05-06 DIAGNOSIS — E11.9 TYPE 2 DIABETES MELLITUS WITHOUT COMPLICATION, WITHOUT LONG-TERM CURRENT USE OF INSULIN (HCC): ICD-10-CM

## 2024-05-06 DIAGNOSIS — E78.2 MIXED HYPERLIPIDEMIA: ICD-10-CM

## 2024-05-06 DIAGNOSIS — E11.22 TYPE 2 DIABETES MELLITUS WITH STAGE 3A CHRONIC KIDNEY DISEASE, WITHOUT LONG-TERM CURRENT USE OF INSULIN (HCC): Primary | ICD-10-CM

## 2024-05-06 PROCEDURE — 99214 OFFICE O/P EST MOD 30 MIN: CPT | Performed by: STUDENT IN AN ORGANIZED HEALTH CARE EDUCATION/TRAINING PROGRAM

## 2024-05-06 PROCEDURE — 95251 CONT GLUC MNTR ANALYSIS I&R: CPT | Performed by: STUDENT IN AN ORGANIZED HEALTH CARE EDUCATION/TRAINING PROGRAM

## 2024-05-06 RX ORDER — GLIMEPIRIDE 4 MG/1
2-4 TABLET ORAL
Qty: 100 TABLET | Refills: 3 | Status: SHIPPED | OUTPATIENT
Start: 2024-05-06

## 2024-05-06 NOTE — PROGRESS NOTES
"Follow Up progress Note      Chief Complaint   Patient presents with    Diabetes      History of Present Illness:   Pipo Morris is a 83 y.o. male with a history of type 2 diabetes Without any complications with other Pmhx of hyperlipidemia and hypertension who presents today for diabetes management.     Patient was first diagnosed with T2DM - 15 years ago  Control since diagnosis: suboptimal   Medications tried thus far:below  Current regime:- metformin XR 500mg BID (reports BG was low when taking 1500mg daily) glimepride 4mg daily in AM  BG control:- Pipo Morris   Device used Dinorah     Indication   Type 2 Diabetes    More than 72 hours of data was reviewed. Report to be scanned to chart.     Date Range: April 23- May 6 2024    Analysis of data:   Average Glucose: 173  Time in Target Range:  60%  Time Above Range:  31%/9%  Time Below Range: 0%      Interpretation of data: Patient still having PPH with bk and dinner    Hypoglycemic episodes: Reports had low BG this AM only.   Hyperglycemia symptoms: no polyuria or polydipsia\",\"no chest pain, dyspnea or TIAs\",\"no numbness, tingling or pain in extremities\"  Diet: reports making dietary changes   Activity: limited    Opthamology:had exam last month ; no retinopathy, no macular edema  Foot exam- 01/24  Hx of hyperlipidemia: yes  Hx of hypertension:no  Last Lipid:- 02/24 normal   Last urine microalbumin: 02/24 normal  Last hbA1C:02/24 6.8%, 12/23 7.9%    Social Hx/family hx- reviewed and as below in chart    Patient Active Problem List   Diagnosis    Mixed hyperlipidemia    Type 2 diabetes mellitus with stage 3a chronic kidney disease, without long-term current use of insulin (HCC)    Nasal sinus congestion    BMI 25.0-25.9,adult    Stage 3 chronic kidney disease, unspecified whether stage 3a or 3b CKD (HCC)    Benign essential HTN    Bifascicular block    Complete heart block (HCC)    Hx of cardiac pacemaker      History reviewed. No pertinent past medical history. " "  Past Surgical History:   Procedure Laterality Date    CARDIAC ELECTROPHYSIOLOGY PROCEDURE N/A 3/16/2023    Procedure: Cardiac pacer implant;  Surgeon: Malik Partida MD;  Location: [facility];  Service: Cardiology    NO PAST SURGERIES        Family History   Problem Relation Age of Onset    Diabetes Sister     Diabetes Brother     Colon cancer Neg Hx     Colon polyps Neg Hx      Social History     Tobacco Use    Smoking status: Never    Smokeless tobacco: Never   Substance Use Topics    Alcohol use: Not Currently     No Known Allergies      Current Outpatient Medications:     amLODIPine (NORVASC) 5 mg tablet, take 1 tablet by mouth once daily, Disp: 90 tablet, Rfl: 1    Continuous Glucose Sensor (FreeStyle Dinorah 2 Sensor) [facility], Replace sensor every 14 days, Disp: 6 each, Rfl: 3    glimepiride (AMARYL) 4 mg tablet, take 1 tablet by mouth every morning before BREAKFAST OR FIRST MAIN MEAL OF THE DAY, Disp: 90 tablet, Rfl: 3    lisinopril (ZESTRIL) 10 mg tablet, take 1 tablet by mouth once daily, Disp: 90 tablet, Rfl: 3    metFORMIN (GLUCOPHAGE-XR) 500 mg 24 hr tablet, Take 3 tablets (1,500 mg total) by mouth daily with dinner, Disp: 300 tablet, Rfl: 1    multivitamin (THERAGRAN) TABS, Take 1 tablet by mouth daily, Disp: , Rfl:     simvastatin (ZOCOR) 20 mg tablet, take 1 tablet by mouth at bedtime, Disp: 90 tablet, Rfl: 3  Review of Systems   Constitutional:  Negative for diaphoresis, fatigue and unexpected weight change.   Respiratory:  Negative for shortness of breath.    Cardiovascular:  Negative for chest pain and palpitations.   Gastrointestinal:  Negative for constipation and diarrhea.   Endocrine: Negative for polydipsia and polyuria.       Physical Exam:  Body mass index is 25.37 kg/m².  Ht 5' 9\" (1.753 m)   Wt 77.9 kg (171 lb 12.8 oz)   BMI 25.37 kg/m²    Wt Readings from Last 3 Encounters:   05/06/24 77.9 kg (171 lb 12.8 oz)   02/28/24 78 kg (172 lb)   01/24/24 78.9 kg (174 lb)       Physical " Exam  Constitutional:       Appearance: Normal appearance.   Cardiovascular:      Rate and Rhythm: Normal rate and regular rhythm.      Pulses: Normal pulses.           Dorsalis pedis pulses are 2+ on the right side and 2+ on the left side.   Pulmonary:      Effort: Pulmonary effort is normal.   Abdominal:      General: Abdomen is flat.      Palpations: Abdomen is soft.   Feet:      Right foot:      Skin integrity: No ulcer, skin breakdown, erythema, warmth, callus or dry skin.      Left foot:      Skin integrity: No ulcer, skin breakdown, erythema, warmth, callus or dry skin.   Skin:     General: Skin is warm.      Capillary Refill: Capillary refill takes less than 2 seconds.   Neurological:      General: No focal deficit present.      Mental Status: He is alert.         Labs:    Latest Reference Range & Units 04/03/23 10:22 07/07/23 09:29 10/31/23 15:28 12/13/23 14:51 02/22/24 10:14   Hemoglobin A1C 4.8 - 5.6 % 7.6 (H) 7.3 ! 7.6 ! 7.9 ! 6.8 (H)   eAG, EST AVG Glucose mg/dL 171    148   (H): Data is abnormally high  !: Data is abnormal     Latest Reference Range & Units 04/03/23 10:22 02/22/24 10:14   EXT Creatinine Urine Not Estab. mg/dL 122.2 131.5   Albumin Creat Ratio 0 - 29 mg/g creat 16 9   Albumin,U,Random Not Estab. ug/mL 19.2 11.7      Latest Reference Range & Units 04/03/23 10:22 02/22/24 10:14   Cholesterol 100 - 199 mg/dL 147 147   Triglycerides 0 - 149 mg/dL 113 79   HDL >39 mg/dL 53 51   LDL Calculated 0 - 99 mg/dL 74 81   LDL/HDL RATIO 0.0 - 3.6 ratio 1.4 1.6   VLDL Cholesterol Carlos 5 - 40 mg/dL 20 15      06/17/22 10:01 12/19/23 14:42   Left Eye Diabetic Retinopathy None (E) None (E)   (E): External lab result     06/17/22 10:01 12/19/23 14:42   Right Eye Diabetic Retinopathy None (E) None (E)   (E): External lab result    Impression:  1. Type 2 diabetes mellitus with stage 3a chronic kidney disease, without long-term current use of insulin (HCC)    2. Mixed hyperlipidemia    3. Stage 3 chronic  kidney disease, unspecified whether stage 3a or 3b CKD (HCC)             Plan:    Pipo was seen today for diabetes.    Diagnoses and all orders for this visit:    Type 2 diabetes mellitus with stage 3a chronic kidney disease, without long-term current use of insulin (HCC)  -     Albumin / creatinine urine ratio; Future  -     Comprehensive metabolic panel; Future  -     Lipid panel; Future  -     Hemoglobin A1C; Future  -     Albumin / creatinine urine ratio  -     Comprehensive metabolic panel  -     Lipid panel  -     Hemoglobin A1C    Mixed hyperlipidemia  -     Albumin / creatinine urine ratio; Future  -     Comprehensive metabolic panel; Future  -     Lipid panel; Future  -     Hemoglobin A1C; Future  -     Albumin / creatinine urine ratio  -     Comprehensive metabolic panel  -     Lipid panel  -     Hemoglobin A1C    Stage 3 chronic kidney disease, unspecified whether stage 3a or 3b CKD (HCC)  -     Albumin / creatinine urine ratio; Future  -     Comprehensive metabolic panel; Future  -     Lipid panel; Future  -     Hemoglobin A1C; Future  -     Albumin / creatinine urine ratio  -     Comprehensive metabolic panel  -     Lipid panel  -     Hemoglobin A1C        1. Type 2 diabetes mellitus without complication, without long-term current use of insulin ([facility])    Patient is a 83yM With PMHx of DM since 15 years ago Without any complications  Who presents today for diabetic care.     1) T2DM:-   Patients last A1C has improved to 6.8% which is excellent for his without any hypoglycemia and hence will not adjust his regime for now. Improved control with dietary changes rather than increased dose of metformin, given this C/w metformin to 1000mg daily however CGM shows PPH with bk and dinner, already on max dose of MCMANUS for bk and thus will recommend starting glimperide 2mg with dinner for now    Plan   - metformin 1000mg Daily at night   - Glimepride 4mg in AM and 2mg in PM     Screening:-   Retinopathy-  UTD  Nephropathy- UTD  Lipide levels- last labs 02/24, normal, order for next visit    2) Hyperlipidemia:- c/w zocor 20mg daily     RTC in 3 months    Discussed with the patient and all questioned fully answered. He will call me if any problems arise.    Counseled patient on diagnostic results, prognosis, risk and benefit of treatment options, instruction for management, importance of treatment compliance, Risk  factor reduction and impressions      Arabella Bedoya MD

## 2024-05-07 ENCOUNTER — TELEPHONE (OUTPATIENT)
Dept: CARDIOLOGY CLINIC | Facility: CLINIC | Age: 84
End: 2024-05-07

## 2024-05-07 NOTE — TELEPHONE ENCOUNTER
Left Message (10:24am) for patient to contact the office back to schedule a follow up in the office for evaluation with Dr Lewis and/or one of the PA's

## 2024-05-07 NOTE — TELEPHONE ENCOUNTER
----- Message from Robert Lewis MD sent at 5/3/2024  3:46 PM EDT -----  Regarding: FW: NSVT >200 BPM ON PACEMAKER    Can we get this patient in to see me or one of the PAs in the next couple weeks.  He was found to have atrial fibrillation on pacemaker interrogation.  Thank you      ----- Message -----  From: Kacie Joe RN  Sent: 4/22/2024   1:44 PM EDT  To: Robert Lewis MD  Subject: NSVT >200 BPM ON PACEMAKER                       FYI on remote pm transmission:    1 NSVT EPISODE ON 3/20/24 FOR 8 BEATS, AVG CL~260MS. EF-60% (ECHO 3/15/23; NST ORDERED BUT NOT SCHEDULED). 160 ATAF EPISODES MAX DURATION 9 MINS- PAT & PAC'S ON AVAILABLE EGM'S. AT/AF BURDEN-0.6%. PT IS NOT ON ANY BETA BLOCKER.     Thanks,  [place]

## 2024-05-30 DIAGNOSIS — E11.9 TYPE 2 DIABETES MELLITUS WITHOUT COMPLICATION, WITHOUT LONG-TERM CURRENT USE OF INSULIN (HCC): ICD-10-CM

## 2024-06-06 ENCOUNTER — TELEPHONE (OUTPATIENT)
Dept: ADMINISTRATIVE | Facility: OTHER | Age: 84
End: 2024-06-06

## 2024-06-06 NOTE — TELEPHONE ENCOUNTER
06/06/24 1:54 PM    Patient contacted to bring Advance Directive, POLST, or Living Will document to next scheduled pcp visit.[facility] Department was unable to leave a message; no answer/ line busy.    Thank you.  Donovan Jaime MA  [facility]

## 2024-06-11 ENCOUNTER — OFFICE VISIT (OUTPATIENT)
Dept: FAMILY MEDICINE CLINIC | Facility: HOSPITAL | Age: 84
End: 2024-06-11
Payer: COMMERCIAL

## 2024-06-11 VITALS
DIASTOLIC BLOOD PRESSURE: 80 MMHG | HEIGHT: 69 IN | WEIGHT: 172 LBS | HEART RATE: 90 BPM | SYSTOLIC BLOOD PRESSURE: 178 MMHG | OXYGEN SATURATION: 99 % | BODY MASS INDEX: 25.48 KG/M2

## 2024-06-11 DIAGNOSIS — N18.31 TYPE 2 DIABETES MELLITUS WITH STAGE 3A CHRONIC KIDNEY DISEASE, WITHOUT LONG-TERM CURRENT USE OF INSULIN (HCC): ICD-10-CM

## 2024-06-11 DIAGNOSIS — E11.9 TYPE 2 DIABETES MELLITUS WITHOUT COMPLICATION, WITHOUT LONG-TERM CURRENT USE OF INSULIN (HCC): ICD-10-CM

## 2024-06-11 DIAGNOSIS — N18.30 STAGE 3 CHRONIC KIDNEY DISEASE, UNSPECIFIED WHETHER STAGE 3A OR 3B CKD (HCC): ICD-10-CM

## 2024-06-11 DIAGNOSIS — E11.22 TYPE 2 DIABETES MELLITUS WITH STAGE 3A CHRONIC KIDNEY DISEASE, WITHOUT LONG-TERM CURRENT USE OF INSULIN (HCC): ICD-10-CM

## 2024-06-11 PROCEDURE — 99214 OFFICE O/P EST MOD 30 MIN: CPT | Performed by: STUDENT IN AN ORGANIZED HEALTH CARE EDUCATION/TRAINING PROGRAM

## 2024-06-11 PROCEDURE — G2211 COMPLEX E/M VISIT ADD ON: HCPCS | Performed by: STUDENT IN AN ORGANIZED HEALTH CARE EDUCATION/TRAINING PROGRAM

## 2024-06-11 RX ORDER — GLIMEPIRIDE 4 MG/1
TABLET ORAL
Qty: 100 TABLET | Refills: 3 | Status: SHIPPED | OUTPATIENT
Start: 2024-06-11

## 2024-06-11 RX ORDER — METFORMIN HYDROCHLORIDE 500 MG/1
500 TABLET, EXTENDED RELEASE ORAL 2 TIMES DAILY WITH MEALS
Qty: 180 TABLET | Refills: 1 | Status: SHIPPED | OUTPATIENT
Start: 2024-06-11

## 2024-06-11 NOTE — PROGRESS NOTES
[facility]   Connie Villegas DO    Assessment/Plan:      Diagnosis ICD-10-CM Associated Orders   1. Type 2 diabetes mellitus without complication, without long-term current use of insulin (HCC)  E11.9 metFORMIN (GLUCOPHAGE-XR) 500 mg 24 hr tablet     glimepiride (AMARYL) 4 mg tablet      2. Stage 3 chronic kidney disease, unspecified whether stage 3a or 3b CKD (HCC)  N18.30 metFORMIN (GLUCOPHAGE-XR) 500 mg 24 hr tablet      3. Type 2 diabetes mellitus with stage 3a chronic kidney disease, without long-term current use of insulin (HCC)  E11.22 metFORMIN (GLUCOPHAGE-XR) 500 mg 24 hr tablet    N18.31         No med changes from our office, but updated list to show changes in dosing of above meds.  Weight stable, getting around well.   August Cardio appt.  August endo appt, labs given. Microalbumin & lipid done for the yr.   Return in about 5 months (around 11/1/2024) for Annual Medicare Wellness.  Patient may call or return to office with any questions or concerns.   __________________________________________________________________  Subjective:     Patient ID: Pipo Morris is a 83 y.o. male.  HPI  Pipo Morris  Chief Complaint   Patient presents with   • Follow-up     4 month     Feeling well, getting out & walking.   Some yardwork. Mows the lawn.     Seen by sofia 5/6/24 -   1) T2DM:- Patients last A1C has improved to 6.8% which is excellent for his without any hypoglycemia and hence will not adjust his regime for now. Improved control with dietary changes rather than increased dose of metformin, given this C/w metformin to 1000mg daily however CGM shows PPH with bk and dinner, already on max dose of MCMANUS for bk and thus will recommend starting glimperide 2mg with dinner for now     Plan   - metformin 1000mg Daily at night - told to go back to bid metformin.   - Glimepride 4mg in AM and 2mg in PM     Using deniz daily   Lowest goes to 70, rarely, keeps candy with him   Morning fasting 125 or so.     Wt  Readings from Last 3 Encounters:   06/11/24 78 kg (172 lb)   05/06/24 77.9 kg (171 lb 12.8 oz)   02/28/24 78 kg (172 lb)     Temp Readings from Last 3 Encounters:   03/17/23 97.9 °F (36.6 °C)   01/03/23 98.2 °F (36.8 °C)     BP Readings from Last 3 Encounters:   06/11/24 (!) 178/80   05/06/24 146/74   02/28/24 142/70     Pulse Readings from Last 3 Encounters:   06/11/24 90   05/06/24 70   02/28/24 57     Depression Screening and Follow-up Plan: Patient was screened for depression during today's encounter. They screened negative with a PHQ-2 score of 0.      The following portions of the patient's history were reviewed and updated as appropriate: allergies, current medications, past medical history, and problem list.    Review of Systems   Constitutional:  Negative for chills and fever.   Eyes:  Negative for photophobia and visual disturbance.   Respiratory:  Negative for cough and shortness of breath.    Cardiovascular:  Negative for chest pain and leg swelling.   Neurological:  Negative for light-headedness and headaches.       Objective:      Vitals:    06/11/24 0944   BP: (!) 178/80   Pulse: 90   SpO2: 99%     RPT /75     Physical Exam  Vitals and nursing note reviewed.   Constitutional:       General: He is not in acute distress.     Appearance: Normal appearance. He is normal weight. He is not ill-appearing.   HENT:      Head: Normocephalic and atraumatic.   Eyes:      General: No scleral icterus.        Right eye: No discharge.         Left eye: No discharge.   Cardiovascular:      Rate and Rhythm: Normal rate and regular rhythm.      Pulses: Normal pulses.      Heart sounds: Normal heart sounds. No murmur heard.  Pulmonary:      Effort: Pulmonary effort is normal. No respiratory distress.      Breath sounds: Normal breath sounds. No stridor. No wheezing.   Musculoskeletal:      Cervical back: Normal range of motion and neck supple. No rigidity.      Right lower leg: No edema.      Left lower leg: No  "edema.   Neurological:      Mental Status: He is alert and oriented to person, place, and time.      Gait: Gait (no cane or walker) normal.   Psychiatric:         Mood and Affect: Mood normal.         Behavior: Behavior normal.         Thought Content: Thought content normal.         Judgment: Judgment normal.         Portions of the record may have been created with voice recognition software. Occasional wrong word or \"sound alike\" substitutions may have occurred due to the inherent limitations of voice recognition software. Please review the chart carefully and recognize, using context, where substitutions/typographical errors may have occurred.     "

## 2024-06-30 DIAGNOSIS — E11.9 TYPE 2 DIABETES MELLITUS WITHOUT COMPLICATION, WITHOUT LONG-TERM CURRENT USE OF INSULIN (HCC): ICD-10-CM

## 2024-06-30 DIAGNOSIS — R03.0 ELEVATED BP WITHOUT DIAGNOSIS OF HYPERTENSION: ICD-10-CM

## 2024-06-30 RX ORDER — AMLODIPINE BESYLATE 5 MG/1
TABLET ORAL
Qty: 90 TABLET | Refills: 1 | Status: SHIPPED | OUTPATIENT
Start: 2024-06-30

## 2024-07-22 ENCOUNTER — REMOTE DEVICE CLINIC VISIT (OUTPATIENT)
Dept: CARDIOLOGY CLINIC | Facility: CLINIC | Age: 84
End: 2024-07-22
Payer: COMMERCIAL

## 2024-07-22 DIAGNOSIS — Z95.0 CARDIAC PACEMAKER IN SITU: Primary | ICD-10-CM

## 2024-07-22 PROCEDURE — 93294 REM INTERROG EVL PM/LDLS PM: CPT | Performed by: STUDENT IN AN ORGANIZED HEALTH CARE EDUCATION/TRAINING PROGRAM

## 2024-07-22 PROCEDURE — 93296 REM INTERROG EVL PM/IDS: CPT | Performed by: STUDENT IN AN ORGANIZED HEALTH CARE EDUCATION/TRAINING PROGRAM

## 2024-07-22 NOTE — PROGRESS NOTES
"Results for orders placed or performed in visit on 07/22/24   Cardiac EP device report    Narrative    MDT DC PM - ACTIVE SYSTEM IS MRI CONDITIONAL  CARELINK TRANSMISSION: BATTERY STATUS \"10 YRS.\" AP 31%  100%. ALL AVAILABLE LEAD PARAMETERS WITHIN NORMAL LIMITS. 1 NSVT NOTED, APPROX 15 BEATS@ 176 BPM. 49 AT/AF NOTED; LONGEST 19 MINS; 0% BURDEN. AVAIL EGRAMS PRESENT AS PAT, PACS, & FF OVS. TRUE AF CAN'T BE EXCLUDED. NO BBLOCKER OR AC NOTED-HX OF SAME. NORMAL DEVICE FUNCTION. NC         "

## 2024-07-30 DIAGNOSIS — E11.9 TYPE 2 DIABETES MELLITUS WITHOUT COMPLICATION, WITHOUT LONG-TERM CURRENT USE OF INSULIN (HCC): ICD-10-CM

## 2024-08-13 LAB
ALBUMIN SERPL-MCNC: 4.5 G/DL (ref 3.7–4.7)
ALBUMIN/CREAT UR: 6 MG/G CREAT (ref 0–29)
ALP SERPL-CCNC: 80 IU/L (ref 44–121)
ALT SERPL-CCNC: 20 IU/L (ref 0–44)
AST SERPL-CCNC: 20 IU/L (ref 0–40)
BILIRUB SERPL-MCNC: 0.6 MG/DL (ref 0–1.2)
BUN SERPL-MCNC: 18 MG/DL (ref 8–27)
BUN/CREAT SERPL: 13 (ref 10–24)
CALCIUM SERPL-MCNC: 9.7 MG/DL (ref 8.6–10.2)
CHLORIDE SERPL-SCNC: 103 MMOL/L (ref 96–106)
CHOLEST SERPL-MCNC: 145 MG/DL (ref 100–199)
CHOLEST/HDLC SERPL: 2.6 RATIO (ref 0–5)
CO2 SERPL-SCNC: 22 MMOL/L (ref 20–29)
CREAT SERPL-MCNC: 1.43 MG/DL (ref 0.76–1.27)
CREAT UR-MCNC: 152 MG/DL
EGFR: 49 ML/MIN/1.73
EST. AVERAGE GLUCOSE BLD GHB EST-MCNC: 160 MG/DL
GLOBULIN SER-MCNC: 2.9 G/DL (ref 1.5–4.5)
GLUCOSE SERPL-MCNC: 187 MG/DL (ref 70–99)
HBA1C MFR BLD: 7.2 % (ref 4.8–5.6)
HDLC SERPL-MCNC: 55 MG/DL
LDLC SERPL CALC-MCNC: 74 MG/DL (ref 0–99)
MICROALBUMIN UR-MCNC: 9.8 UG/ML
POTASSIUM SERPL-SCNC: 4.7 MMOL/L (ref 3.5–5.2)
PROT SERPL-MCNC: 7.4 G/DL (ref 6–8.5)
SL AMB VLDL CHOLESTEROL CALC: 16 MG/DL (ref 5–40)
SODIUM SERPL-SCNC: 141 MMOL/L (ref 134–144)
TRIGL SERPL-MCNC: 85 MG/DL (ref 0–149)

## 2024-08-15 NOTE — PROGRESS NOTES
"Follow Up progress Note      Chief Complaint   Patient presents with    Diabetes Type 2      History of Present Illness:   Pipo Morris is a 83 y.o. male with a history of type 2 diabetes Without any complications with other Pmhx of hyperlipidemia and hypertension who presents today for diabetes management.     Patient was first diagnosed with T2DM - 15 years ago  Control since diagnosis: suboptimal   Medications tried thus far:below  Current regime:- metformin XR 500mg BID (reports BG was low when taking 1500mg daily) glimepride 4mg daily in AM and 2mg in PM. Takes glimepride right before he eats.   BG control:- Pipo Morris   Device used Dinorah     Indication   Type 2 Diabetes    More than 72 hours of data was reviewed. Report to be scanned to chart.     Date Range: Aug 3- Aug 16 2024    Analysis of data:   Average Glucose: 191  Time in Target Range:  48%  Time Above Range:   38%/14%  Time Below Range: 0%    Interpretation of data: BG raise primarily with bk and dinner    Hypoglycemic episodes: None  Hyperglycemia symptoms: no polyuria or polydipsia\",\"no chest pain, dyspnea or TIAs\",\"no numbness, tingling or pain in extremities\"  Diet: bk is eggs/holt or sausage, dinner is whatever.   Activity: limited    Opthamology:had exam last month ; no retinopathy, no macular edema  Foot exam- 01/24  Hx of hyperlipidemia: yes  Hx of hypertension:no  Last Lipid:- 07/24 normal   Last urine microalbumin: 07/24 normal  Last hbA1C:7/24 7.2%, 02/24 6.8%, 12/23 7.9%    Social Hx/family hx- reviewed and as below in chart    Patient Active Problem List   Diagnosis    Mixed hyperlipidemia    Type 2 diabetes mellitus with stage 3a chronic kidney disease, without long-term current use of insulin (HCC)    Nasal sinus congestion    BMI 25.0-25.9,adult    Stage 3 chronic kidney disease, unspecified whether stage 3a or 3b CKD (HCC)    Benign essential HTN    Bifascicular block    Complete heart block (HCC)    Hx of cardiac pacemaker    " "  History reviewed. No pertinent past medical history.   Past Surgical History:   Procedure Laterality Date    CARDIAC ELECTROPHYSIOLOGY PROCEDURE N/A 3/16/2023    Procedure: Cardiac pacer implant;  Surgeon: Malik Partida MD;  Location: [facility];  Service: Cardiology    NO PAST SURGERIES        Family History   Problem Relation Age of Onset    Diabetes Sister     Diabetes Brother     Colon cancer Neg Hx     Colon polyps Neg Hx      Social History     Tobacco Use    Smoking status: Never    Smokeless tobacco: Never   Substance Use Topics    Alcohol use: Not Currently     No Known Allergies      Current Outpatient Medications:     amLODIPine (NORVASC) 5 mg tablet, take 1 tablet by mouth once daily, Disp: 90 tablet, Rfl: 1    Continuous Glucose Sensor (FreeStyle Dinorah 2 Sensor) MISC, Replace sensor every 14 days, Disp: 6 each, Rfl: 1    glimepiride (AMARYL) 4 mg tablet, 4mg in AM and 2mg in PM, Disp: 100 tablet, Rfl: 3    lisinopril (ZESTRIL) 10 mg tablet, take 1 tablet by mouth once daily, Disp: 90 tablet, Rfl: 3    metFORMIN (GLUCOPHAGE-XR) 500 mg 24 hr tablet, Take 1 tablet (500 mg total) by mouth 2 (two) times a day with meals, Disp: 180 tablet, Rfl: 1    multivitamin (THERAGRAN) TABS, Take 1 tablet by mouth daily, Disp: , Rfl:     simvastatin (ZOCOR) 20 mg tablet, take 1 tablet by mouth at bedtime, Disp: 90 tablet, Rfl: 3  Review of Systems   Constitutional:  Negative for diaphoresis, fatigue and unexpected weight change.   Respiratory:  Negative for shortness of breath.    Cardiovascular:  Negative for chest pain and palpitations.   Gastrointestinal:  Negative for constipation and diarrhea.   Endocrine: Negative for polydipsia and polyuria.       Physical Exam:  Body mass index is 25.4 kg/m².  /80   Pulse 75   Ht 5' 9\" (1.753 m)   Wt 78 kg (172 lb)   SpO2 98%   BMI 25.40 kg/m²    Wt Readings from Last 3 Encounters:   08/16/24 78 kg (172 lb)   06/11/24 78 kg (172 lb)   05/06/24 77.9 kg (171 lb " 12.8 oz)       Physical Exam  Constitutional:       Appearance: Normal appearance.   Cardiovascular:      Rate and Rhythm: Normal rate and regular rhythm.      Pulses: Normal pulses.           Dorsalis pedis pulses are 2+ on the right side and 2+ on the left side.   Pulmonary:      Effort: Pulmonary effort is normal.   Abdominal:      General: Abdomen is flat.      Palpations: Abdomen is soft.   Feet:      Right foot:      Skin integrity: No ulcer, skin breakdown, erythema, warmth, callus or dry skin.      Left foot:      Skin integrity: No ulcer, skin breakdown, erythema, warmth, callus or dry skin.   Skin:     General: Skin is warm.      Capillary Refill: Capillary refill takes less than 2 seconds.   Neurological:      General: No focal deficit present.      Mental Status: He is alert.         Labs:    Latest Reference Range & Units 10/31/23 15:28 12/13/23 14:51 02/22/24 10:14 08/12/24 09:07   Hemoglobin A1C 4.8 - 5.6 % 7.6 ! 7.9 ! 6.8 (H) 7.2 (H)   eAG, EST AVG Glucose mg/dL   148 160   !: Data is abnormal  (H): Data is abnormally high   Latest Reference Range & Units 02/22/24 10:14 08/12/24 09:07   EXT Creatinine Urine Not Estab. mg/dL 131.5 152.0   Albumin Creat Ratio 0 - 29 mg/g creat 9 6   Albumin,U,Random Not Estab. ug/mL 11.7 9.8      Latest Reference Range & Units 02/22/24 10:14 08/12/24 09:07   Cholesterol 100 - 199 mg/dL 147 145   Triglycerides 0 - 149 mg/dL 79 85   HDL >39 mg/dL 51 55   LDL Calculated 0 - 99 mg/dL 81 74   LDL/HDL RATIO 0.0 - 3.6 ratio 1.6    VLDL Cholesterol Carlos 5 - 40 mg/dL 15 16      06/17/22 10:01 12/19/23 14:42   Left Eye Diabetic Retinopathy None (E) None (E)   (E): External lab result     06/17/22 10:01 12/19/23 14:42   Right Eye Diabetic Retinopathy None (E) None (E)   (E): External lab result    Impression:  1. Type 2 diabetes mellitus without complication, without long-term current use of insulin (HCC)    2. Type 2 diabetes mellitus with stage 3a chronic kidney disease, without  long-term current use of insulin ([facility])    3. Mixed hyperlipidemia    4. Stage 3 chronic kidney disease, unspecified whether stage 3a or 3b CKD ([facility])               Plan:    Pipo was seen today for diabetes type 2.    Diagnoses and all orders for this visit:    Type 2 diabetes mellitus without complication, without long-term current use of insulin ([facility])  -     Comprehensive metabolic panel; Future  -     Albumin / creatinine urine ratio; Future  -     Lipid panel; Future  -     Hemoglobin A1C; Future  -     Comprehensive metabolic panel  -     Albumin / creatinine urine ratio  -     Lipid panel  -     Hemoglobin A1C    Type 2 diabetes mellitus with stage 3a chronic kidney disease, without long-term current use of insulin ([facility])  -     Comprehensive metabolic panel; Future  -     Albumin / creatinine urine ratio; Future  -     Lipid panel; Future  -     Hemoglobin A1C; Future  -     Comprehensive metabolic panel  -     Albumin / creatinine urine ratio  -     Lipid panel  -     Hemoglobin A1C    Mixed hyperlipidemia  -     Comprehensive metabolic panel; Future  -     Albumin / creatinine urine ratio; Future  -     Lipid panel; Future  -     Hemoglobin A1C; Future  -     Comprehensive metabolic panel  -     Albumin / creatinine urine ratio  -     Lipid panel  -     Hemoglobin A1C    Stage 3 chronic kidney disease, unspecified whether stage 3a or 3b CKD ([facility])  -     Comprehensive metabolic panel; Future  -     Albumin / creatinine urine ratio; Future  -     Lipid panel; Future  -     Hemoglobin A1C; Future  -     Comprehensive metabolic panel  -     Albumin / creatinine urine ratio  -     Lipid panel  -     Hemoglobin A1C          1. Type 2 diabetes mellitus without complication, without long-term current use of insulin ([facility])    Patient is a 83yM With PMHx of DM since 15 years ago Without any complications  Who presents today for diabetic care.     1) T2DM:- Patients last A1C is slightly higher than before but still  reasonable at 7.2% which is excellent for his age without any hypoglycemia however CGM shows high BG primarily after breakfast and dinner leading to high BG 38% and very high 14% of the time and TIR only 48%. Patient already on max dose of glimepride with bk. We will increase his glimepride with dinner to 4mg and also discussed reducing carb amount with bk and eating more protein rich foods.     Plan   - metformin 1000mg Daily at night   - Glimepride 4mg in AM and Inc 4mg in PM- please take it 30 mins before eating.     Screening:-   Retinopathy- UTD  Nephropathy- UTD  Lipide levels- last labs 07/24, normal, order for next visit    2) Hyperlipidemia:- c/w zocor 20mg daily     RTC in 3 months    Discussed with the patient and all questioned fully answered. He will call me if any problems arise.    Counseled patient on diagnostic results, prognosis, risk and benefit of treatment options, instruction for management, importance of treatment compliance, Risk  factor reduction and impressions      Arabella Bedoya MD

## 2024-08-16 ENCOUNTER — OFFICE VISIT (OUTPATIENT)
Dept: ENDOCRINOLOGY | Facility: HOSPITAL | Age: 84
End: 2024-08-16
Payer: COMMERCIAL

## 2024-08-16 VITALS
WEIGHT: 172 LBS | SYSTOLIC BLOOD PRESSURE: 138 MMHG | DIASTOLIC BLOOD PRESSURE: 80 MMHG | BODY MASS INDEX: 25.48 KG/M2 | HEIGHT: 69 IN | OXYGEN SATURATION: 98 % | HEART RATE: 75 BPM

## 2024-08-16 DIAGNOSIS — E11.9 TYPE 2 DIABETES MELLITUS WITHOUT COMPLICATION, WITHOUT LONG-TERM CURRENT USE OF INSULIN (HCC): Primary | ICD-10-CM

## 2024-08-16 DIAGNOSIS — E11.22 TYPE 2 DIABETES MELLITUS WITH STAGE 3A CHRONIC KIDNEY DISEASE, WITHOUT LONG-TERM CURRENT USE OF INSULIN (HCC): ICD-10-CM

## 2024-08-16 DIAGNOSIS — E78.2 MIXED HYPERLIPIDEMIA: ICD-10-CM

## 2024-08-16 DIAGNOSIS — N18.31 TYPE 2 DIABETES MELLITUS WITH STAGE 3A CHRONIC KIDNEY DISEASE, WITHOUT LONG-TERM CURRENT USE OF INSULIN (HCC): ICD-10-CM

## 2024-08-16 DIAGNOSIS — E11.9 TYPE 2 DIABETES MELLITUS WITHOUT COMPLICATION, WITHOUT LONG-TERM CURRENT USE OF INSULIN (HCC): ICD-10-CM

## 2024-08-16 DIAGNOSIS — N18.30 STAGE 3 CHRONIC KIDNEY DISEASE, UNSPECIFIED WHETHER STAGE 3A OR 3B CKD (HCC): ICD-10-CM

## 2024-08-16 PROCEDURE — 99214 OFFICE O/P EST MOD 30 MIN: CPT | Performed by: STUDENT IN AN ORGANIZED HEALTH CARE EDUCATION/TRAINING PROGRAM

## 2024-08-16 PROCEDURE — 95251 CONT GLUC MNTR ANALYSIS I&R: CPT | Performed by: STUDENT IN AN ORGANIZED HEALTH CARE EDUCATION/TRAINING PROGRAM

## 2024-08-16 RX ORDER — GLIMEPIRIDE 4 MG/1
TABLET ORAL
Qty: 100 TABLET | Refills: 3 | Status: SHIPPED | OUTPATIENT
Start: 2024-08-16

## 2024-08-27 ENCOUNTER — TELEPHONE (OUTPATIENT)
Age: 84
End: 2024-08-27

## 2024-08-27 ENCOUNTER — OFFICE VISIT (OUTPATIENT)
Dept: CARDIOLOGY CLINIC | Facility: CLINIC | Age: 84
End: 2024-08-27
Payer: COMMERCIAL

## 2024-08-27 VITALS
HEIGHT: 69 IN | WEIGHT: 173 LBS | SYSTOLIC BLOOD PRESSURE: 158 MMHG | HEART RATE: 76 BPM | DIASTOLIC BLOOD PRESSURE: 70 MMHG | BODY MASS INDEX: 25.62 KG/M2

## 2024-08-27 DIAGNOSIS — I47.10 PSVT (PAROXYSMAL SUPRAVENTRICULAR TACHYCARDIA): ICD-10-CM

## 2024-08-27 DIAGNOSIS — E78.2 MIXED HYPERLIPIDEMIA: ICD-10-CM

## 2024-08-27 DIAGNOSIS — I45.2 BIFASCICULAR BLOCK: ICD-10-CM

## 2024-08-27 DIAGNOSIS — Z95.0 CARDIAC PACEMAKER IN SITU: ICD-10-CM

## 2024-08-27 DIAGNOSIS — I44.2 COMPLETE HEART BLOCK (HCC): Primary | ICD-10-CM

## 2024-08-27 DIAGNOSIS — I10 BENIGN ESSENTIAL HTN: ICD-10-CM

## 2024-08-27 DIAGNOSIS — Z95.0 HX OF CARDIAC PACEMAKER: ICD-10-CM

## 2024-08-27 DIAGNOSIS — R93.1 ABNORMAL ECHOCARDIOGRAM: ICD-10-CM

## 2024-08-27 PROCEDURE — 99214 OFFICE O/P EST MOD 30 MIN: CPT | Performed by: INTERNAL MEDICINE

## 2024-08-27 PROCEDURE — 93000 ELECTROCARDIOGRAM COMPLETE: CPT | Performed by: INTERNAL MEDICINE

## 2024-08-27 RX ORDER — METOPROLOL SUCCINATE 50 MG/1
50 TABLET, EXTENDED RELEASE ORAL DAILY
Qty: 90 TABLET | Refills: 3 | Status: SHIPPED | OUTPATIENT
Start: 2024-08-27

## 2024-08-27 NOTE — PROGRESS NOTES
Cardiology Follow-up    Pipo Morris  [id number]  1940  [facility] [place] [id number] [facility]  [address] [id number] [phone]    1. Complete heart block (HCC)  POCT ECG    NM myocardial perfusion spect (rx stress and/or rest)      2. Cardiac pacemaker in situ  POCT ECG      3. Bifascicular block  NM myocardial perfusion spect (rx stress and/or rest)      4. Benign essential HTN  metoprolol succinate (TOPROL-XL) 50 mg 24 hr tablet      5. Hx of cardiac pacemaker        6. Mixed hyperlipidemia        7. PSVT (paroxysmal supraventricular tachycardia)  metoprolol succinate (TOPROL-XL) 50 mg 24 hr tablet    NM myocardial perfusion spect (rx stress and/or rest)      8. Abnormal echocardiogram  NM myocardial perfusion spect (rx stress and/or rest)          Discussion/Summary:    1.  History of bifascicular block and complete heart block - Pipo progressed to complete heart block this past March 2023 and is now status post permanent pacemaker.  He will continue to have his pacemaker follow the device clinic.  He is V-paced 100% of the time and A-paced about 30% of the time.    2.  Hypertension - This was uncontrolled when we first met him.  He was started on amlodipine and then we added lisinopril to his regimen.  It is still elevated but as stated below we are adding Toprol-XL to his regimen.  He should periodically check his blood pressure at home.    3.  Hyperlipidemia - On simvastatin.  He has blood work followed regularly by his PCP.  LDL has been basically at goal.    4.  Abnormal echocardiogram and ECG - When I first met Pipo he had a bifascicular block.  He then had an echocardiogram that showed regional wall motion abnormalities in the mid to apical inferior lateral wall.  A stress echocardiogram was ordered at that time but this was never performed.  I also ordered a pharmacologic nuclear stress test last  year that he never got done.  I have asked him to get this done in the near future and I re-ordered this today.  If this is unremarkable he will be back to see us in 1 year.    5.  PSVT, NSVT and frequent PACs - These were detected on device interrogations.  He does get some intermittent palpitations.  I am going to add Toprol-XL 50 mg daily and we will continue to follow his device interrogations.      HPI:    Mr. Morris comes in for follow-up given his history of permanent pacemaker and his risk factors for cardiovascular disease.  I met him about 2 years ago.  At that time his history included hypertension, hyperlipidemia, diabetes mellitus and has chronic kidney disease.  He has been on simvastatin 20 mg daily for hyperlipidemia and a few months prior was started on amlodipine 5 mg daily.    When I initially met him we obtained an ECG that showed a bifascicular block, right bundle branch block and left anterior fascicular block.  Given this we ordered some baseline testing.  He underwent an echocardiogram which showed normal LV systolic function and regional wall motion abnormalities in the mid to distal inferior lateral wall to apex.  We did order a stress echocardiogram but he never had this done.  Then in March of 2023 Pipo was having worsening fatigue and generalized weakness.  He went to his PCP for a visit and he was found to be in complete heart block.  At that point he went to the emergency room and is now status post permanent pacemaker which went well.    Due to his abnormal echocardiogram and history of complete heart block, I wanted Pipo to go through a stress nuclear study last year.  He never got this done.  His pacemaker interrogations continue to show normal device function.  He is 100% V-paced and A-paced about 30% of the time.  Interrogations are also showing runs of reported PSVT, nonsustained VT and also sinus rhythm with frequent PACs.    Mr. Morris has felt better since his permanent  pacemaker.  His fatigue and weakness improved.  He is active without limitations.  He denies any chest pain or symptoms of angina.  No shortness of breath or any signs/symptoms of CHF.  He has been having some intermittent palpitations likely associated with the findings on the pacer interrogations.  He denies lightheadedness or syncope.  His systolic blood pressures have been elevated.      Patient Active Problem List   Diagnosis    Mixed hyperlipidemia    Type 2 diabetes mellitus with stage 3a chronic kidney disease, without long-term current use of insulin (HCC)    Nasal sinus congestion    BMI 25.0-25.9,adult    Stage 3 chronic kidney disease, unspecified whether stage 3a or 3b CKD (HCC)    Benign essential HTN    Bifascicular block    Complete heart block (HCC)    Hx of cardiac pacemaker    PSVT (paroxysmal supraventricular tachycardia)     History reviewed. No pertinent past medical history.  Social History     Socioeconomic History    Marital status:      Spouse name: Not on file    Number of children: Not on file    Years of education: Not on file    Highest education level: Not on file   Occupational History    Not on file   Tobacco Use    Smoking status: Never    Smokeless tobacco: Never   Vaping Use    Vaping status: Never Used   Substance and Sexual Activity    Alcohol use: Not Currently    Drug use: Never    Sexual activity: Not Currently   Other Topics Concern    Not on file   Social History Narrative          Social Determinants of Health     Financial Resource Strain: Low Risk  (10/31/2023)    Overall Financial Resource Strain (CARDIA)     Difficulty of Paying Living Expenses: Not hard at all   Food Insecurity: No Food Insecurity (3/17/2023)    Hunger Vital Sign     Worried About Running Out of Food in the Last Year: Never true     Ran Out of Food in the Last Year: Never true   Transportation Needs: No Transportation Needs (10/31/2023)    PRAPARE - Transportation     Lack of  Transportation (Medical): No     Lack of Transportation (Non-Medical): No   Physical Activity: Not on file   Stress: Not on file   Social Connections: Not on file   Intimate Partner Violence: Not on file   Housing Stability: Low Risk  (3/17/2023)    Housing Stability Vital Sign     Unable to Pay for Housing in the Last Year: No     Number of Places Lived in the Last Year: 1     Unstable Housing in the Last Year: No      Family History   Problem Relation Age of Onset    Diabetes Sister     Diabetes Brother     Colon cancer Neg Hx     Colon polyps Neg Hx      Past Surgical History:   Procedure Laterality Date    CARDIAC ELECTROPHYSIOLOGY PROCEDURE N/A 3/16/2023    Procedure: Cardiac pacer implant;  Surgeon: Malik Partida MD;  Location: [facility];  Service: Cardiology    NO PAST SURGERIES         Current Outpatient Medications:     Continuous Glucose Sensor (FreeStyle Dinorah 2 Sensor) [facility], Replace sensor every 14 days, Disp: 6 each, Rfl: 1    glimepiride (AMARYL) 4 mg tablet, 4mg in AM and 4mg in PM, Disp: 100 tablet, Rfl: 3    lisinopril (ZESTRIL) 10 mg tablet, take 1 tablet by mouth once daily, Disp: 90 tablet, Rfl: 3    metFORMIN (GLUCOPHAGE-XR) 500 mg 24 hr tablet, Take 1 tablet (500 mg total) by mouth 2 (two) times a day with meals, Disp: 180 tablet, Rfl: 1    metoprolol succinate (TOPROL-XL) 50 mg 24 hr tablet, Take 1 tablet (50 mg total) by mouth daily, Disp: 90 tablet, Rfl: 3    multivitamin (THERAGRAN) TABS, Take 1 tablet by mouth daily, Disp: , Rfl:     simvastatin (ZOCOR) 20 mg tablet, take 1 tablet by mouth at bedtime, Disp: 90 tablet, Rfl: 3    amLODIPine (NORVASC) 5 mg tablet, take 1 tablet by mouth once daily, Disp: 90 tablet, Rfl: 1  No Known Allergies    Labs:  Lab Results   Component Value Date    K 4.7 08/12/2024    K 4.3 02/22/2024    K 4.6 04/03/2023    CO2 22 08/12/2024    CO2 21 02/22/2024    CO2 23 04/03/2023    BUN 18 08/12/2024    BUN 18 02/22/2024    BUN 13 04/03/2023    CREATININE  "1.43 (H) 08/12/2024    CREATININE 1.31 (H) 02/22/2024    CREATININE 1.18 04/03/2023    CREATININE 1.15 03/17/2023    CREATININE 1.22 03/16/2023    CREATININE 1.26 03/15/2023    CALCIUM 9.0 03/17/2023    CALCIUM 9.1 03/16/2023    CALCIUM 9.4 03/15/2023     Lab Results   Component Value Date    WBC 8.5 02/22/2024    WBC 10.14 03/17/2023    HGB 14.2 02/22/2024    HGB 14.0 03/17/2023    HCT 43.6 02/22/2024    HCT 44.1 03/17/2023     (H) 02/22/2024     (H) 03/17/2023     02/22/2024     03/17/2023     Lab Results   Component Value Date    TRIG 85 08/12/2024    HDL 55 08/12/2024     Imaging:  ECG obtained today sinus rhythm with ventricular pacing, and intermittent atrial paced complexes and PACs.    ECHO (3/15/2023):   Left Ventricle: Left ventricular cavity size is normal. Wall thickness is moderately increased. The left ventricular ejection fraction is 60%. Systolic function is normal. There is hypokinesis of the mid to apical inferolateral wall and basal inferior wall.     Review of Systems:  Review of Systems   Constitutional: Negative.    HENT: Negative.     Eyes: Negative.    Respiratory: Negative.     Cardiovascular:  Positive for palpitations.   Gastrointestinal: Negative.    Musculoskeletal: Negative.    Skin: Negative.    Allergic/Immunologic: Negative.    Neurological: Negative.    Hematological: Negative.    Psychiatric/Behavioral: Negative.     All other systems reviewed and are negative.    Vitals:    08/27/24 1043   BP: 158/70   BP Location: Right arm   Patient Position: Sitting   Cuff Size: Standard   Pulse: 76   Weight: 78.5 kg (173 lb)   Height: 5' 9\" (1.753 m)     Physical Exam  Vitals and nursing note reviewed.   Constitutional:       Appearance: He is well-developed.   HENT:      Head: Normocephalic and atraumatic.   Eyes:      General: No scleral icterus.        Right eye: No discharge.         Left eye: No discharge.      Pupils: Pupils are equal, round, and reactive to " light.   Neck:      Thyroid: No thyromegaly.      Vascular: No JVD.   Cardiovascular:      Rate and Rhythm: Normal rate and regular rhythm. Extrasystoles are present.     Pulses: Normal pulses. No decreased pulses.      Heart sounds: Normal heart sounds, S1 normal and S2 normal. No murmur heard.     No friction rub. No gallop.   Pulmonary:      Effort: Pulmonary effort is normal. No respiratory distress.      Breath sounds: Normal breath sounds. No wheezing, rhonchi or rales.   Abdominal:      General: Bowel sounds are normal. There is no distension.      Palpations: Abdomen is soft.      Tenderness: There is no abdominal tenderness.   Musculoskeletal:         General: No tenderness or deformity. Normal range of motion.      Cervical back: Normal range of motion and neck supple.   Skin:     General: Skin is warm and dry.      Findings: No rash.   Neurological:      Mental Status: He is alert and oriented to person, place, and time.      Cranial Nerves: No cranial nerve deficit.   Psychiatric:         Thought Content: Thought content normal.         Judgment: Judgment normal.       Counseling / Coordination of Care  Total office time spent today 25 minutes.  Greater than 50% of total time was spent with the patient and / or family counseling and / or coordination of care.

## 2024-09-10 DIAGNOSIS — I10 PRIMARY HYPERTENSION: ICD-10-CM

## 2024-09-10 RX ORDER — LISINOPRIL 10 MG/1
10 TABLET ORAL DAILY
Qty: 90 TABLET | Refills: 1 | Status: SHIPPED | OUTPATIENT
Start: 2024-09-10

## 2024-09-21 DIAGNOSIS — E11.9 TYPE 2 DIABETES MELLITUS WITHOUT COMPLICATION, WITHOUT LONG-TERM CURRENT USE OF INSULIN (HCC): ICD-10-CM

## 2024-09-23 DIAGNOSIS — E11.9 TYPE 2 DIABETES MELLITUS WITHOUT COMPLICATION, WITHOUT LONG-TERM CURRENT USE OF INSULIN (HCC): ICD-10-CM

## 2024-09-23 RX ORDER — GLIMEPIRIDE 4 MG/1
TABLET ORAL
Qty: 100 TABLET | Refills: 1 | Status: SHIPPED | OUTPATIENT
Start: 2024-09-23 | End: 2024-09-24

## 2024-09-24 RX ORDER — GLIMEPIRIDE 4 MG/1
TABLET ORAL
Qty: 180 TABLET | Refills: 1 | Status: SHIPPED | OUTPATIENT
Start: 2024-09-24

## 2024-09-27 DIAGNOSIS — E11.9 TYPE 2 DIABETES MELLITUS WITHOUT COMPLICATION, WITHOUT LONG-TERM CURRENT USE OF INSULIN (HCC): ICD-10-CM

## 2024-10-11 DIAGNOSIS — R03.0 ELEVATED BP WITHOUT DIAGNOSIS OF HYPERTENSION: ICD-10-CM

## 2024-10-11 RX ORDER — AMLODIPINE BESYLATE 5 MG/1
5 TABLET ORAL DAILY
Qty: 90 TABLET | Refills: 0 | Status: SHIPPED | OUTPATIENT
Start: 2024-10-11

## 2024-10-20 DIAGNOSIS — E11.9 TYPE 2 DIABETES MELLITUS WITHOUT COMPLICATION, WITHOUT LONG-TERM CURRENT USE OF INSULIN (HCC): ICD-10-CM

## 2024-10-20 DIAGNOSIS — E78.2 MIXED HYPERLIPIDEMIA: ICD-10-CM

## 2024-10-22 RX ORDER — SIMVASTATIN 20 MG
20 TABLET ORAL
Qty: 90 TABLET | Refills: 1 | Status: SHIPPED | OUTPATIENT
Start: 2024-10-22

## 2024-10-30 ENCOUNTER — TELEPHONE (OUTPATIENT)
Dept: ADMINISTRATIVE | Facility: OTHER | Age: 84
End: 2024-10-30

## 2024-10-30 NOTE — TELEPHONE ENCOUNTER
10/30/24 12:51 PM    Patient contacted to bring Advance Directive, POLST, or Living Will document to next scheduled pcp visit.[facility] Department left message.    Thank you.  Bobby Casper MA  [facility]

## 2024-11-04 ENCOUNTER — OFFICE VISIT (OUTPATIENT)
Dept: FAMILY MEDICINE CLINIC | Facility: HOSPITAL | Age: 84
End: 2024-11-04
Payer: COMMERCIAL

## 2024-11-04 VITALS
WEIGHT: 172.4 LBS | HEIGHT: 69 IN | HEART RATE: 59 BPM | SYSTOLIC BLOOD PRESSURE: 128 MMHG | OXYGEN SATURATION: 97 % | BODY MASS INDEX: 25.53 KG/M2 | DIASTOLIC BLOOD PRESSURE: 75 MMHG

## 2024-11-04 DIAGNOSIS — E11.22 TYPE 2 DIABETES MELLITUS WITH STAGE 3A CHRONIC KIDNEY DISEASE, WITHOUT LONG-TERM CURRENT USE OF INSULIN (HCC): ICD-10-CM

## 2024-11-04 DIAGNOSIS — N18.31 TYPE 2 DIABETES MELLITUS WITH STAGE 3A CHRONIC KIDNEY DISEASE, WITHOUT LONG-TERM CURRENT USE OF INSULIN (HCC): ICD-10-CM

## 2024-11-04 DIAGNOSIS — Z00.00 ROUTINE ADULT HEALTH MAINTENANCE: Primary | ICD-10-CM

## 2024-11-04 DIAGNOSIS — Z23 ENCOUNTER FOR IMMUNIZATION: ICD-10-CM

## 2024-11-04 DIAGNOSIS — N18.31 STAGE 3A CHRONIC KIDNEY DISEASE (HCC): ICD-10-CM

## 2024-11-04 DIAGNOSIS — I10 BENIGN ESSENTIAL HTN: ICD-10-CM

## 2024-11-04 PROCEDURE — G0008 ADMIN INFLUENZA VIRUS VAC: HCPCS | Performed by: STUDENT IN AN ORGANIZED HEALTH CARE EDUCATION/TRAINING PROGRAM

## 2024-11-04 PROCEDURE — G0439 PPPS, SUBSEQ VISIT: HCPCS | Performed by: STUDENT IN AN ORGANIZED HEALTH CARE EDUCATION/TRAINING PROGRAM

## 2024-11-04 PROCEDURE — 99213 OFFICE O/P EST LOW 20 MIN: CPT | Performed by: STUDENT IN AN ORGANIZED HEALTH CARE EDUCATION/TRAINING PROGRAM

## 2024-11-04 PROCEDURE — 90662 IIV NO PRSV INCREASED AG IM: CPT | Performed by: STUDENT IN AN ORGANIZED HEALTH CARE EDUCATION/TRAINING PROGRAM

## 2024-11-04 PROCEDURE — G0444 DEPRESSION SCREEN ANNUAL: HCPCS | Performed by: STUDENT IN AN ORGANIZED HEALTH CARE EDUCATION/TRAINING PROGRAM

## 2024-11-04 NOTE — PATIENT INSTRUCTIONS
Patient Education     Preventing falls in adults   The Basics   Written by the doctors and editors at [facility]   Am I at risk of falling? -- Falls can happen to anyone, no matter how old they are. Several things can increase your risk of a fall, including:   Vision problems   Having certain chronic health conditions, being sick, having recently been in the hospital, or having had surgery   Changing the medicines you take   An unsafe or unfamiliar setting (for example, a room with rugs or furniture that might trip you, or an area you don't know well)  Your risk of falling increases as you grow older. That's because getting older can make it harder to walk steadily and keep your balance. Also, the effects of falls are more serious for older people.  Overall, 3 to 4 out of every 10 people over the age of 65 fall each year. Many people who fracture a hip never fully recover to how they were before the fracture. If you have fallen in the past, you are at higher risk of falling again.  How can my doctor help me avoid falling? -- Your doctor can talk to you about the following things:   Past falls - It is important to tell your doctor about any times that you have fallen or almost fallen. They can then suggest ways to prevent another fall.   Your health conditions - Some health problems can put you at risk of falling. These include conditions that affect eyesight, hearing, muscle strength, or balance.   What to do if you are in the hospital - Falls can happen in the hospital because you are in an unfamiliar place. You might feel unsteady or drowsy from medicines or anesthesia. Or you might be attached to catheters or IV tubing that you could trip over. You are also likely to be weaker than usual.   Your medicines - Certain medicines can increase the risk of falling. These include some medicines for sleeping problems, anxiety, high blood pressure, or depression. Adding new medicines, or changing doses of some medicines, can  also affect your risk of falling.  The more your doctor knows about your situation, the better they can help you. For example, if you fell because you have a condition that causes pain, your doctor might suggest treatments to help with the pain. Or if any of your medicines are making you dizzy and more likely to fall, your doctor might switch you to a different medicine.  Is there anything I can do on my own? -- Yes. To help keep from falling, you can:   Make your home safer - To avoid falling at home, get rid of things that might make you trip or slip. This can include furniture, electrical cords, clutter, and loose rugs (figure 1). Keep your home well lit so you can easily see where you are going. Avoid storing things in high places so you don't have to reach or climb.   Wear non-slip socks or sturdy shoes that fit well - Wearing shoes with high heels or slippery soles, or shoes that are too loose, can lead to falls. Walking around in bare feet, or only socks, can also increase your risk of falling.   Take vitamin D pills - Taking vitamin D might lower the risk of falls in older people. This is because vitamin D helps make bones and muscles stronger. Your doctor can talk to you about whether you should take extra vitamin D, and how much.   Stay active - Moving your body regularly can help lower your risk of falling. It might also help prevent you from getting hurt if you do fall. It is best to do a few different activities that help with both strength and balance. There are many kinds of exercise that can be safe for older people. These include walking, swimming, and parker chi (a Chinese martial art involving slow, gentle movements).   Use a cane, walker, or other safety devices - If your doctor recommends that you use a cane or walker, make sure that it's the right size and you know how to use it. There are other devices that might help you avoid falling, too. These include grab bars or a sturdy seat for the  shower, non-slip bath mats, and hand rails or treads for the stairs (to prevent slipping).   Take extra care if you have had surgery or are in the hospital - Ask for help with getting out of bed, getting up from a chair, or going to the bathroom. Your body needs time to heal, and it's normal to need help with these things while you recover. It can also help to keep your belongings within reach, and avoid walking around in the dark. If you need help, use the call button instead of trying to get up.  If you worry that you could fall, you can get an emergency alert system. This is usually an alarm button that lets you call for help if you fall and can't get up. If you live alone and you do not have an emergency alert system, always carry a cell phone or portable phone with you when moving around the house.  How do I get up from a fall? -- If you do fall, try not to be afraid. Stay calm, and take slow, deep breaths. If someone is near you, call for help right away. If you have an emergency alert system, use it.  Try to find out if you are hurt. If you are hurt badly, trying to get up can make things worse. If you do not think that you are hurt badly, come up with a plan to get up off of the floor.  Some tips to get up after a fall if you are alone:   Look around you for a piece of sturdy furniture such as a couch or chair. Try to move your body to the furniture. You might need to scoot, crawl, or roll to get close. Do these movements very slowly. If you feel any sharp pains, you might need to stop.   Once you are close to the furniture, roll onto your side. Pull your knees up toward your chest, and try to get on your hands and knees.   Put your hands on the seat of the couch or chair.   Bring 1 leg forward so the foot is flat on the floor. If you have a stronger leg, move this leg first.   Now, try to stand up. Once both feet are on the ground, slowly turn and lower yourself to sit down on the seat.  What should I do  after a fall? -- If you had a fall, see your doctor right away, even if you aren't hurt. Your doctor can try to figure out what caused you to fall, and how likely you are to fall again. They will do an exam and talk to you about your health problems, medicines, and activities. They can also check how well you walk, move, and balance. Then, they can suggest things you can do to lower your risk of falling again.  Many older people have a hard time recovering after a fall. Doing things to prevent falling can help you protect your health and independence.  All topics are updated as new evidence becomes available and our peer review process is complete.  This topic retrieved from [employer] on: Apr 04, 2024.  Topic 66237 Version 25.0  Release: 32.2.4 - C32.93  © 2024 UpToDate, Inc. and/or its affiliates. All rights reserved.  figure 1: How to avoid falling at home     This picture shows some of the things that can cause a fall in your home. Look around and remove any loose rugs, electrical cords, clutter, or furniture that could trip you.  Graphic 17292 Version 1.0  Consumer Information Use and Disclaimer   Disclaimer: This generalized information is a limited summary of diagnosis, treatment, and/or medication information. It is not meant to be comprehensive and should be used as a tool to help the user understand and/or assess potential diagnostic and treatment options. It does NOT include all information about conditions, treatments, medications, side effects, or risks that may apply to a specific patient. It is not intended to be medical advice or a substitute for the medical advice, diagnosis, or treatment of a health care provider based on the health care provider's examination and assessment of a patient's specific and unique circumstances. Patients must speak with a health care provider for complete information about their health, medical questions, and treatment options, including any risks or benefits regarding  use of medications. This information does not endorse any treatments or medications as safe, effective, or approved for treating a specific patient. UpToDate, Inc. and its affiliates disclaim any warranty or liability relating to this information or the use thereof.The use of this information is governed by the Terms of Use, available at https://www.[employer].com/en/know/clinical-effectiveness-terms. 2024© UpToDate, Inc. and its affiliates and/or licensors. All rights reserved.  Copyright   © 2024 UpToDate, Inc. and/or its affiliates. All rights reserved.

## 2024-11-04 NOTE — ASSESSMENT & PLAN NOTE
Better control since cardio appt & addition of toprol, but doesn't love the med. Msg sent to cardio.   Fatigue - Thinks this is due ot his new med from cardio. Thought initially norvasc, but on that now for 2 yrs. New med is toprol XL & likely the culprit.   Message sent to his cardio.   Device check upcoming.

## 2024-11-04 NOTE — PROGRESS NOTES
Ambulatory Visit  Name: Pipo Morris      : 1940      MRN: [id number]  Encounter Provider: Connie Villegas DO  Encounter Date: 2024   Encounter department: [facility] CARE [address]    Assessment & Plan  Routine adult health maintenance  Feels well overall, but gets tired at times.   Flu shot given today. PCV UTD.        Type 2 diabetes mellitus with stage 3a chronic kidney disease, without long-term current use of insulin (HCC)    Lab Results   Component Value Date    HGBA1C 7.2 (H) 2024   Stable. Labs upcoming in 2 weeks.   DFE due after new year.   Upcoming endo appt.   No weight gain.        Stage 3a chronic kidney disease (HCC)  Lab Results   Component Value Date    EGFR 49 (L) 2024    EGFR 54 (L) 2024    EGFR 62 2023    CREATININE 1.43 (H) 2024    CREATININE 1.31 (H) 2024    CREATININE 1.18 2023   Stable, comparable to 2 yrs ago, unchanged.   No urination problems.        Benign essential HTN  Better control since cardio appt & addition of toprol, but doesn't love the med. Msg sent to cardio.   Fatigue - Thinks this is due ot his new med from cardio. Thought initially norvasc, but on that now for 2 yrs. New med is toprol XL & likely the culprit.   Message sent to his cardio.   Device check upcoming.        Encounter for immunization  Orders:    influenza vaccine, high-dose, PF 0.5 mL (Fluzone High Dose)      Depression Screening and Follow-up Plan: Patient was screened for depression during today's encounter. They screened negative with a PHQ-2 score of 0.    Falls Plan of Care: balance, strength, and gait training instructions were provided.       Preventive health issues were discussed with patient, and age appropriate screening tests were ordered as noted in patient's After Visit Summary. Personalized health advice and appropriate referrals for health education or preventive services given if needed, as noted in patient's After  "Visit Summary.    History of Present Illness     HPI   Has been walking, active.   Noticed some morning sugars can be high, but no weight gain.   Labs due next month before endo appt 11/25.     Not sick recently.     Wt Readings from Last 3 Encounters:   11/04/24 78.2 kg (172 lb 6.4 oz)   08/27/24 78.5 kg (173 lb)   08/16/24 78 kg (172 lb)     Temp Readings from Last 3 Encounters:   03/17/23 97.9 °F (36.6 °C)   01/03/23 98.2 °F (36.8 °C)     BP Readings from Last 3 Encounters:   11/04/24 128/75   08/27/24 158/70   08/16/24 138/80     Pulse Readings from Last 3 Encounters:   11/04/24 59   08/27/24 76   08/16/24 75     Patient Care Team:  Connie Villegas DO as PCP - General (Family Medicine)  Arabella Bedoya MD as PCP - Endocrinology (Endocrinology)  Zaynab Muro RD as Diabetes Educator (Dietician)    Review of Systems   Constitutional:  Positive for fatigue (really ever since starting the norvasc 5 mg). Negative for appetite change, chills, diaphoresis, fever and unexpected weight change.   Eyes:  Negative for photophobia and visual disturbance.   Respiratory:  Negative for cough and shortness of breath.    Cardiovascular:  Positive for palpitations (could be \"nerves\" gets dinesh patter beats in different areas of the body). Negative for chest pain and leg swelling.   Neurological:  Negative for dizziness, light-headedness and headaches.     Medical History Reviewed by provider this encounter:  Tobacco  Allergies  Meds  Problems  Med Hx  Surg Hx  Fam Hx       Annual Wellness Visit Questionnaire   Pipo is here for his Subsequent Wellness visit.     Health Risk Assessment:   Patient rates overall health as good. Patient feels that their physical health rating is slightly worse. Patient is very satisfied with their life. Eyesight was rated as same. Hearing was rated as same. Patient feels that their emotional and mental health rating is same. Patients states they are never, rarely angry. Patient states they are " never, rarely unusually tired/fatigued. Pain experienced in the last 7 days has been none. Patient states that he has experienced no weight loss or gain in last 6 months.     Depression Screening:   PHQ-2 Score: 0      Fall Risk Screening:   In the past year, patient has experienced: no history of falling in past year      Home Safety:  Patient does not have trouble with stairs inside or outside of their home. Patient has working smoke alarms and has working carbon monoxide detector. Home safety hazards include: none.     Nutrition:   Current diet is Regular.     Medications:   Patient is currently taking over-the-counter supplements. OTC medications include: see medication list. Patient is able to manage medications.     Activities of Daily Living (ADLs)/Instrumental Activities of Daily Living (IADLs):   Walk and transfer into and out of bed and chair?: Yes  Dress and groom yourself?: Yes    Bathe or shower yourself?: Yes    Feed yourself? Yes  Do your laundry/housekeeping?: Yes  Manage your money, pay your bills and track your expenses?: Yes  Make your own meals?: Yes    Do your own shopping?: Yes    Previous Hospitalizations:   Any hospitalizations or ED visits within the last 12 months?: No      Advance Care Planning:   Living will: Yes    Durable POA for healthcare: Yes    Advanced directive: Yes      Cognitive Screening:   Provider or family/friend/caregiver concerned regarding cognition?: No    PREVENTIVE SCREENINGS      Cardiovascular Screening:    General: History Lipid Disorder and Screening Current      Diabetes Screening:     General: History Diabetes and Screening Current      Colorectal Cancer Screening:     General: Screening Not Indicated      Prostate Cancer Screening:    General: Screening Not Indicated      Osteoporosis Screening:    General: Screening Not Indicated      Abdominal Aortic Aneurysm (AAA) Screening:        General: Screening Not Indicated      Lung Cancer Screening:     General:  Screening Not Indicated      Hepatitis C Screening:    General: Screening Not Indicated    Screening, Brief Intervention, and Referral to Treatment (SBIRT)    Screening      AUDIT-C Screenin) How often did you have a drink containing alcohol in the past year? never  2) How many drinks did you have on a typical day when you were drinking in the past year? 0  3) How often did you have 6 or more drinks on one occasion in the past year? never    AUDIT-C Score: 0  Interpretation: Score 0-3 (male): Negative screen for alcohol misuse    Single Item Drug Screening:  How often have you used an illegal drug (including marijuana) or a prescription medication for non-medical reasons in the past year? never    Single Item Drug Screen Score: 0  Interpretation: Negative screen for possible drug use disorder    Annual Depression Screening  Time spent screening and evaluating the patient for depression during today's encounter was 5 minutes.    Other Counseling Topics:   Car/seat belt/driving safety, skin self-exam, sunscreen and calcium and vitamin D intake and regular weightbearing exercise.     Social Determinants of Health     Financial Resource Strain: Low Risk  (10/31/2023)    Overall Financial Resource Strain (CARDIA)     Difficulty of Paying Living Expenses: Not hard at all   Food Insecurity: No Food Insecurity (2024)    Hunger Vital Sign     Worried About Running Out of Food in the Last Year: Never true     Ran Out of Food in the Last Year: Never true   Transportation Needs: No Transportation Needs (2024)    PRAPARE - Transportation     Lack of Transportation (Medical): No     Lack of Transportation (Non-Medical): No   Housing Stability: Low Risk  (2024)    Housing Stability Vital Sign     Unable to Pay for Housing in the Last Year: No     Number of Times Moved in the Last Year: 1     Homeless in the Last Year: No   Utilities: Not At Risk (2024)    [facility] Utilities     Threatened with loss of  "utilities: No     Objective     /75   Pulse 59   Ht 5' 9\" (1.753 m)   Wt 78.2 kg (172 lb 6.4 oz)   SpO2 97%   BMI 25.46 kg/m²     Physical Exam  Vitals reviewed.   Constitutional:       General: He is not in acute distress.     Appearance: Normal appearance. He is normal weight. He is not ill-appearing.   HENT:      Head: Normocephalic and atraumatic.   Eyes:      General: No scleral icterus.        Right eye: No discharge.         Left eye: No discharge.   Cardiovascular:      Rate and Rhythm: Normal rate and regular rhythm.      Pulses: Normal pulses.      Heart sounds: Normal heart sounds. No murmur heard.  Pulmonary:      Effort: Pulmonary effort is normal. No respiratory distress.      Breath sounds: Normal breath sounds. No wheezing.   Musculoskeletal:      Cervical back: Normal range of motion and neck supple.      Right lower leg: No edema.      Left lower leg: No edema.   Neurological:      Mental Status: He is alert and oriented to person, place, and time.      Gait: Gait normal.   Psychiatric:         Mood and Affect: Mood normal.         Behavior: Behavior normal.         Thought Content: Thought content normal.         Judgment: Judgment normal.         "

## 2024-11-04 NOTE — ASSESSMENT & PLAN NOTE
Lab Results   Component Value Date    HGBA1C 7.2 (H) 08/12/2024   Stable. Labs upcoming in 2 weeks.   DFE due after new year.   Upcoming endo appt.   No weight gain.

## 2024-11-12 ENCOUNTER — TELEPHONE (OUTPATIENT)
Dept: CARDIOLOGY CLINIC | Facility: CLINIC | Age: 84
End: 2024-11-12

## 2024-11-13 ENCOUNTER — IN-CLINIC DEVICE VISIT (OUTPATIENT)
Dept: CARDIOLOGY CLINIC | Facility: CLINIC | Age: 84
End: 2024-11-13
Payer: COMMERCIAL

## 2024-11-13 ENCOUNTER — TELEPHONE (OUTPATIENT)
Dept: CARDIOLOGY CLINIC | Facility: CLINIC | Age: 84
End: 2024-11-13

## 2024-11-13 ENCOUNTER — RESULTS FOLLOW-UP (OUTPATIENT)
Dept: CARDIOLOGY CLINIC | Facility: CLINIC | Age: 84
End: 2024-11-13

## 2024-11-13 DIAGNOSIS — Z95.0 CARDIAC PACEMAKER IN SITU: Primary | ICD-10-CM

## 2024-11-13 PROCEDURE — 93280 PM DEVICE PROGR EVAL DUAL: CPT | Performed by: STUDENT IN AN ORGANIZED HEALTH CARE EDUCATION/TRAINING PROGRAM

## 2024-11-13 NOTE — PROGRESS NOTES
Results for orders placed or performed in visit on 11/13/24   Cardiac EP device report    Narrative    MDT DC PM - ACTIVE SYSTEM IS MRI CONDITIONAL  DEVICE INTERROGATED IN THE [place] OFFICE: BATTERY VOLTAGE ADEQUATE-11 YRS. AP 34%  98%. ALL AVAILABLE LEAD PARAMETERS & TRENDS WITHIN NORMAL LIMITS. 273 AT/AF NOTED; 0% BURDEN; LONGEST 9.04 MINS. PT ON METO SUCC & EF 60% (ECHO 2023). AVAIL EGRAMS PRESENT AS PAT, PACS, & COMP A PACING. CAN'T EXCLUDE PAF. PVAB CHANGED TO PARTIAL+. NORMAL DEVICE FUNCTION. NC

## 2024-11-13 NOTE — TELEPHONE ENCOUNTER
----- Message from Venus DONOHUE sent at 11/13/2024 11:45 AM EST -----  Regarding: BBlocker side effects  Pt states since starting Metoprolol he has had  more fatigue, workout not the same, and just not self.     I did tell him some will get those side effects; looking into either decreasing dose or switching to a different one.

## 2024-11-18 DIAGNOSIS — E11.9 TYPE 2 DIABETES MELLITUS WITHOUT COMPLICATION, WITHOUT LONG-TERM CURRENT USE OF INSULIN (HCC): ICD-10-CM

## 2024-11-23 LAB
ALBUMIN SERPL-MCNC: 4.3 G/DL (ref 3.7–4.7)
ALBUMIN/CREAT UR: 7 MG/G CREAT (ref 0–29)
ALP SERPL-CCNC: 78 IU/L (ref 44–121)
ALT SERPL-CCNC: 14 IU/L (ref 0–44)
AST SERPL-CCNC: 17 IU/L (ref 0–40)
BILIRUB SERPL-MCNC: 0.6 MG/DL (ref 0–1.2)
BUN SERPL-MCNC: 17 MG/DL (ref 8–27)
BUN/CREAT SERPL: 13 (ref 10–24)
CALCIUM SERPL-MCNC: 9.6 MG/DL (ref 8.6–10.2)
CHLORIDE SERPL-SCNC: 100 MMOL/L (ref 96–106)
CHOLEST SERPL-MCNC: 172 MG/DL (ref 100–199)
CHOLEST/HDLC SERPL: 3.7 RATIO (ref 0–5)
CO2 SERPL-SCNC: 24 MMOL/L (ref 20–29)
CREAT SERPL-MCNC: 1.28 MG/DL (ref 0.76–1.27)
CREAT UR-MCNC: 117.5 MG/DL
EGFR: 56 ML/MIN/1.73
EST. AVERAGE GLUCOSE BLD GHB EST-MCNC: 174 MG/DL
GLOBULIN SER-MCNC: 2.9 G/DL (ref 1.5–4.5)
GLUCOSE SERPL-MCNC: 210 MG/DL (ref 70–99)
HBA1C MFR BLD: 7.7 % (ref 4.8–5.6)
HDLC SERPL-MCNC: 46 MG/DL
LDLC SERPL CALC-MCNC: 102 MG/DL (ref 0–99)
MICROALBUMIN UR-MCNC: 8.4 UG/ML
POTASSIUM SERPL-SCNC: 4.6 MMOL/L (ref 3.5–5.2)
PROT SERPL-MCNC: 7.2 G/DL (ref 6–8.5)
SL AMB VLDL CHOLESTEROL CALC: 24 MG/DL (ref 5–40)
SODIUM SERPL-SCNC: 138 MMOL/L (ref 134–144)
TRIGL SERPL-MCNC: 133 MG/DL (ref 0–149)

## 2024-11-25 ENCOUNTER — RESULTS FOLLOW-UP (OUTPATIENT)
Dept: ENDOCRINOLOGY | Facility: HOSPITAL | Age: 84
End: 2024-11-25

## 2024-12-19 DIAGNOSIS — E11.9 TYPE 2 DIABETES MELLITUS WITHOUT COMPLICATION, WITHOUT LONG-TERM CURRENT USE OF INSULIN (HCC): ICD-10-CM

## 2025-01-01 DIAGNOSIS — N18.31 TYPE 2 DIABETES MELLITUS WITH STAGE 3A CHRONIC KIDNEY DISEASE, WITHOUT LONG-TERM CURRENT USE OF INSULIN (HCC): ICD-10-CM

## 2025-01-01 DIAGNOSIS — E11.9 TYPE 2 DIABETES MELLITUS WITHOUT COMPLICATION, WITHOUT LONG-TERM CURRENT USE OF INSULIN (HCC): ICD-10-CM

## 2025-01-01 DIAGNOSIS — N18.30 STAGE 3 CHRONIC KIDNEY DISEASE, UNSPECIFIED WHETHER STAGE 3A OR 3B CKD (HCC): ICD-10-CM

## 2025-01-01 DIAGNOSIS — E11.22 TYPE 2 DIABETES MELLITUS WITH STAGE 3A CHRONIC KIDNEY DISEASE, WITHOUT LONG-TERM CURRENT USE OF INSULIN (HCC): ICD-10-CM

## 2025-01-02 RX ORDER — METFORMIN HYDROCHLORIDE 500 MG/1
TABLET, EXTENDED RELEASE ORAL
Qty: 270 TABLET | Refills: 1 | Status: SHIPPED | OUTPATIENT
Start: 2025-01-02

## 2025-01-19 DIAGNOSIS — E11.9 TYPE 2 DIABETES MELLITUS WITHOUT COMPLICATION, WITHOUT LONG-TERM CURRENT USE OF INSULIN (HCC): ICD-10-CM

## 2025-02-17 ENCOUNTER — REMOTE DEVICE CLINIC VISIT (OUTPATIENT)
Dept: CARDIOLOGY CLINIC | Facility: CLINIC | Age: 85
End: 2025-02-17
Payer: COMMERCIAL

## 2025-02-17 DIAGNOSIS — Z95.0 PRESENCE OF PERMANENT CARDIAC PACEMAKER: Primary | ICD-10-CM

## 2025-02-17 DIAGNOSIS — E11.9 TYPE 2 DIABETES MELLITUS WITHOUT COMPLICATION, WITHOUT LONG-TERM CURRENT USE OF INSULIN (HCC): ICD-10-CM

## 2025-02-17 PROCEDURE — 93294 REM INTERROG EVL PM/LDLS PM: CPT | Performed by: STUDENT IN AN ORGANIZED HEALTH CARE EDUCATION/TRAINING PROGRAM

## 2025-02-17 PROCEDURE — 93296 REM INTERROG EVL PM/IDS: CPT | Performed by: STUDENT IN AN ORGANIZED HEALTH CARE EDUCATION/TRAINING PROGRAM

## 2025-02-17 NOTE — PROGRESS NOTES
MDT DC PM - ACTIVE SYSTEM IS MRI CONDITIONAL   CARELINK TRANSMISSION:  BATTERY VOLTAGE ADEQUATE (11.2 YR.).  AP 44.7%  99.8% (CHB/DDD 60 PPM, -180 MS).  ALL LEAD PARAMETERS WITHIN NORMAL LIMITS.  NO SIGNIFICANT HIGH RATE EPISODES.  NORMAL DEVICE FUNCTION.  RG

## 2025-02-18 ENCOUNTER — RESULTS FOLLOW-UP (OUTPATIENT)
Dept: NON INVASIVE DIAGNOSTICS | Facility: HOSPITAL | Age: 85
End: 2025-02-18

## 2025-02-21 ENCOUNTER — TELEPHONE (OUTPATIENT)
Dept: CARDIOLOGY CLINIC | Facility: CLINIC | Age: 85
End: 2025-02-21

## 2025-03-14 DIAGNOSIS — I10 PRIMARY HYPERTENSION: ICD-10-CM

## 2025-03-14 RX ORDER — LISINOPRIL 10 MG/1
10 TABLET ORAL DAILY
Qty: 90 TABLET | Refills: 1 | Status: SHIPPED | OUTPATIENT
Start: 2025-03-14

## 2025-03-18 DIAGNOSIS — E11.9 TYPE 2 DIABETES MELLITUS WITHOUT COMPLICATION, WITHOUT LONG-TERM CURRENT USE OF INSULIN (HCC): ICD-10-CM

## 2025-03-27 ENCOUNTER — OFFICE VISIT (OUTPATIENT)
Dept: FAMILY MEDICINE CLINIC | Facility: HOSPITAL | Age: 85
End: 2025-03-27
Payer: COMMERCIAL

## 2025-03-27 VITALS
WEIGHT: 173.8 LBS | BODY MASS INDEX: 25.74 KG/M2 | DIASTOLIC BLOOD PRESSURE: 76 MMHG | TEMPERATURE: 97.6 F | HEART RATE: 66 BPM | OXYGEN SATURATION: 99 % | SYSTOLIC BLOOD PRESSURE: 128 MMHG | HEIGHT: 69 IN

## 2025-03-27 DIAGNOSIS — J30.2 SEASONAL ALLERGIES: Primary | ICD-10-CM

## 2025-03-27 DIAGNOSIS — R03.0 ELEVATED BP WITHOUT DIAGNOSIS OF HYPERTENSION: ICD-10-CM

## 2025-03-27 PROCEDURE — 99213 OFFICE O/P EST LOW 20 MIN: CPT

## 2025-03-27 RX ORDER — CETIRIZINE HYDROCHLORIDE 5 MG/1
5 TABLET ORAL DAILY PRN
Qty: 60 TABLET | Refills: 1 | Status: SHIPPED | OUTPATIENT
Start: 2025-03-27

## 2025-03-27 RX ORDER — FLUTICASONE PROPIONATE 50 MCG
2 SPRAY, SUSPENSION (ML) NASAL DAILY PRN
Qty: 15.8 ML | Refills: 0 | Status: SHIPPED | OUTPATIENT
Start: 2025-03-27

## 2025-03-27 RX ORDER — AMLODIPINE BESYLATE 5 MG/1
5 TABLET ORAL DAILY
Qty: 90 TABLET | Refills: 0 | Status: SHIPPED | OUTPATIENT
Start: 2025-03-27

## 2025-03-27 NOTE — PROGRESS NOTES
Name: Pipo Morris      : 1940      MRN: [id number]  Encounter Provider: Isaias Ann MD  Encounter Date: 3/27/2025   Encounter department: [facility] PRIMARY CARE [address]  :  Assessment & Plan  Elevated BP without diagnosis of hypertension    Orders:    amLODIPine (NORVASC) 5 mg tablet; Take 1 tablet (5 mg total) by mouth daily    Seasonal allergies  Symptoms of chronic rhinitis and watery itchy eyes consistent with seasonal allergies.  Will treat symptomatically with Flonase and low-dose Zyrtec.  Advised that Zyrtec can cause some sedation, to be aware how it affects him, and to discontinue it if it makes him feel off balance in any way.  May consider ENT referral down the road if symptoms persist and are specific to the left side as there could be a structural blockage.  No sinus tenderness on exam    Orders:    fluticasone (FLONASE) 50 mcg/act nasal spray; 2 sprays into each nostril daily as needed for allergies or rhinitis    cetirizine (ZyrTEC) 5 MG tablet; Take 1 tablet (5 mg total) by mouth daily as needed for allergies or rhinitis           History of Present Illness     Patient reports chronic nasal congestion and watery itchy eyes that have been worse over the past week with irritation over the left side of his upper nose.  Denies any fever, chills, shortness of breath, cough.  Does not feel like this has had the course of a viral infection.      Sinus Problem  Associated symptoms include sinus pressure (left side). Pertinent negatives include no chills, congestion, ear pain or shortness of breath.     Review of Systems   Constitutional:  Negative for chills and fever.   HENT:  Positive for rhinorrhea, sinus pressure (left side) and sinus pain. Negative for congestion and ear pain.    Respiratory:  Negative for shortness of breath and wheezing.    Cardiovascular:  Negative for chest pain.   Gastrointestinal:  Negative for diarrhea, nausea and vomiting.       Objective   BP  "128/76   Pulse 66   Temp 97.6 °F (36.4 °C)   Ht 5' 9\" (1.753 m)   Wt 78.8 kg (173 lb 12.8 oz)   SpO2 99%   BMI 25.67 kg/m²      Physical Exam  Constitutional:       General: He is not in acute distress.     Appearance: Normal appearance. He is not ill-appearing, toxic-appearing or diaphoretic.   HENT:      Head: Normocephalic.      Comments: No tenderness over maxillary or frontal sinuses     Right Ear: Tympanic membrane, ear canal and external ear normal. There is no impacted cerumen.      Left Ear: Tympanic membrane, ear canal and external ear normal. There is no impacted cerumen.      Nose: Rhinorrhea present.      Mouth/Throat:      Mouth: Mucous membranes are moist.      Pharynx: Oropharynx is clear. No oropharyngeal exudate or posterior oropharyngeal erythema.   Eyes:      General:         Right eye: No discharge.         Left eye: No discharge.      Conjunctiva/sclera: Conjunctivae normal.   Cardiovascular:      Rate and Rhythm: Normal rate and regular rhythm.      Heart sounds: Normal heart sounds. No murmur heard.  Pulmonary:      Effort: Pulmonary effort is normal. No respiratory distress.      Breath sounds: Normal breath sounds.   Neurological:      Mental Status: He is alert and oriented to person, place, and time.   Psychiatric:         Mood and Affect: Mood normal.         Behavior: Behavior normal.         "

## 2025-04-15 DIAGNOSIS — E78.2 MIXED HYPERLIPIDEMIA: ICD-10-CM

## 2025-04-15 DIAGNOSIS — E11.9 TYPE 2 DIABETES MELLITUS WITHOUT COMPLICATION, WITHOUT LONG-TERM CURRENT USE OF INSULIN (HCC): ICD-10-CM

## 2025-04-17 RX ORDER — SIMVASTATIN 20 MG
20 TABLET ORAL
Qty: 90 TABLET | Refills: 1 | Status: SHIPPED | OUTPATIENT
Start: 2025-04-17

## 2025-05-02 ENCOUNTER — TELEPHONE (OUTPATIENT)
Dept: ADMINISTRATIVE | Facility: OTHER | Age: 85
End: 2025-05-02

## 2025-05-02 NOTE — TELEPHONE ENCOUNTER
05/02/25 2:50 PM    Patient contacted to bring Advance Directive, POLST, or Living Will document to next scheduled pcp visit.[facility] Department left message.    Thank you.  Taylor Carrillo MA  [facility]

## 2025-05-05 ENCOUNTER — OFFICE VISIT (OUTPATIENT)
Dept: FAMILY MEDICINE CLINIC | Facility: HOSPITAL | Age: 85
End: 2025-05-05
Payer: COMMERCIAL

## 2025-05-05 VITALS
BODY MASS INDEX: 25.62 KG/M2 | OXYGEN SATURATION: 95 % | SYSTOLIC BLOOD PRESSURE: 140 MMHG | HEART RATE: 58 BPM | HEIGHT: 69 IN | DIASTOLIC BLOOD PRESSURE: 80 MMHG | WEIGHT: 173 LBS

## 2025-05-05 DIAGNOSIS — E78.2 MIXED HYPERLIPIDEMIA: ICD-10-CM

## 2025-05-05 DIAGNOSIS — I47.10 PSVT (PAROXYSMAL SUPRAVENTRICULAR TACHYCARDIA) (HCC): ICD-10-CM

## 2025-05-05 DIAGNOSIS — E11.22 TYPE 2 DIABETES MELLITUS WITH STAGE 3A CHRONIC KIDNEY DISEASE, WITHOUT LONG-TERM CURRENT USE OF INSULIN (HCC): Primary | ICD-10-CM

## 2025-05-05 DIAGNOSIS — N18.31 TYPE 2 DIABETES MELLITUS WITH STAGE 3A CHRONIC KIDNEY DISEASE, WITHOUT LONG-TERM CURRENT USE OF INSULIN (HCC): Primary | ICD-10-CM

## 2025-05-05 DIAGNOSIS — N18.31 STAGE 3A CHRONIC KIDNEY DISEASE (HCC): ICD-10-CM

## 2025-05-05 DIAGNOSIS — I44.2 COMPLETE HEART BLOCK (HCC): ICD-10-CM

## 2025-05-05 DIAGNOSIS — J30.2 SEASONAL ALLERGIES: ICD-10-CM

## 2025-05-05 DIAGNOSIS — I10 BENIGN ESSENTIAL HTN: ICD-10-CM

## 2025-05-05 LAB — SL AMB POCT HEMOGLOBIN AIC: 7.5 (ref ?–6.5)

## 2025-05-05 PROCEDURE — 83036 HEMOGLOBIN GLYCOSYLATED A1C: CPT | Performed by: STUDENT IN AN ORGANIZED HEALTH CARE EDUCATION/TRAINING PROGRAM

## 2025-05-05 PROCEDURE — 99214 OFFICE O/P EST MOD 30 MIN: CPT | Performed by: STUDENT IN AN ORGANIZED HEALTH CARE EDUCATION/TRAINING PROGRAM

## 2025-05-05 RX ORDER — METOPROLOL SUCCINATE 25 MG/1
25 TABLET, EXTENDED RELEASE ORAL DAILY
Qty: 100 TABLET | Refills: 1 | Status: SHIPPED | OUTPATIENT
Start: 2025-05-05

## 2025-05-05 NOTE — ASSESSMENT & PLAN NOTE
On lisinopril, and amlodipine for BP.   BP Readings from Last 3 Encounters:   05/05/25 140/80   03/27/25 128/76   11/04/24 128/75     F/W Cardio - Msg from Nov - Dr. Lewis wanted to let you know that he suggests cutting the medication metoprolol XL 50 mg in half to 25 mg daily and see if your symptoms improve with that medication adjustment.     Has been doing 25 mg since then & feeling better. No issues.   Will reorder lower dose so no longer need to cut.   Orders:  •  metoprolol succinate (TOPROL-XL) 25 mg 24 hr tablet; Take 1 tablet (25 mg total) by mouth daily - NO LONGER CUTTING IN HALF

## 2025-05-05 NOTE — ASSESSMENT & PLAN NOTE
Zocor 20 mg qhs. No issues or myalgias reported.   Orders:  •  Lipid panel; Future  •  Comprehensive metabolic panel; Future

## 2025-05-05 NOTE — PATIENT INSTRUCTIONS
[facility]: [phone]  call to make appt     FASTING LABS ordered for prior to endocrine appt    Detail Level: Detailed

## 2025-05-05 NOTE — PROGRESS NOTES
Name: Pipo Morris      : 1940      MRN: [id number]  Encounter Provider: Connie Villegas DO  Encounter Date: 2025   Encounter department: [facility] PRIMARY CARE [address]  :  Assessment & Plan  Type 2 diabetes mellitus with stage 3a chronic kidney disease, without long-term current use of insulin (HCC)  Lab Results   Component Value Date    HGBA1C 7.5 (A) 2025   7.7 in Nov, better this time. No recent med changes   Amaryl 1 tab in morning & night.   Metformin 1 tab in am & pm for several months now. A1c reflects that.   Rare episodes of feeling shaky, not sure if sugar related.   F/W endocrine call to make new appt. FASTING LABS ordered for prior to endocrine appt   Using deniz sensor.   Orders:  •  POCT hemoglobin A1c  •  Hemoglobin A1C; Future  •  Comprehensive metabolic panel; Future  •  Albumin / creatinine urine ratio; Future    Benign essential HTN  On lisinopril, and amlodipine for BP.   BP Readings from Last 3 Encounters:   25 140/80   25 128/76   24 128/75     F/W Cardio - Msg from Nov - Dr. Lewis wanted to let you know that he suggests cutting the medication metoprolol XL 50 mg in half to 25 mg daily and see if your symptoms improve with that medication adjustment.     Has been doing 25 mg since then & feeling better. No issues.   Will reorder lower dose so no longer need to cut.   Orders:  •  metoprolol succinate (TOPROL-XL) 25 mg 24 hr tablet; Take 1 tablet (25 mg total) by mouth daily - NO LONGER CUTTING IN HALF    Seasonal allergies  Zyrtec and flonase helping, see below.        Stage 3a chronic kidney disease (HCC)  Lab Results   Component Value Date    EGFR 56 (L) 2024    EGFR 49 (L) 2024    EGFR 54 (L) 2024    CREATININE 1.28 (H) 2024    CREATININE 1.43 (H) 2024    CREATININE 1.31 (H) 2024   Stable & appropriate for age.   Could increase water.   Not using NSAID's, just tylenol.   Orders:  •  CBC and  differential; Future    PSVT (paroxysmal supraventricular tachycardia) (HCC)  Dose reordered at lower pill.   Orders:  •  metoprolol succinate (TOPROL-XL) 25 mg 24 hr tablet; Take 1 tablet (25 mg total) by mouth daily - NO LONGER CUTTING IN HALF    Complete heart block (HCC)  Cardio f/u in Sept for yrly check up as scheduled.   Meds reviewwed & reconciled.        Mixed hyperlipidemia  Zocor 20 mg qhs. No issues or myalgias reported.   Orders:  •  Lipid panel; Future  •  Comprehensive metabolic panel; Future    Return in about 6 months (around 11/5/2025) for Annual Medicare Wellness.      Depression Screening and Follow-up Plan:   Clincally patient does not have depression. No treatment is required.       History of Present Illness   HPI  Chief Complaint   Patient presents with   • Follow-up     Seen in March for allergies, started on zyrtec and flonase.   Had been working well, but a little worse since starting AC at the house  Wants to replace the filter. Still taking meds now & helping some.   Stuffy or runny nose. Watery eyes, not red or itchy.     Not sick recently.     Wt Readings from Last 3 Encounters:   05/05/25 78.5 kg (173 lb)   03/27/25 78.8 kg (173 lb 12.8 oz)   11/04/24 78.2 kg (172 lb 6.4 oz)     Temp Readings from Last 3 Encounters:   03/27/25 97.6 °F (36.4 °C)   03/17/23 97.9 °F (36.6 °C)   01/03/23 98.2 °F (36.8 °C)     BP Readings from Last 3 Encounters:   05/05/25 140/80   03/27/25 128/76   11/04/24 128/75     Pulse Readings from Last 3 Encounters:   05/05/25 58   03/27/25 66   11/04/24 59     Review of Systems   Constitutional:  Negative for chills and fever.   HENT:  Positive for congestion and rhinorrhea. Negative for ear pain and sneezing.    Eyes:  Positive for discharge. Negative for pain, redness and itching.   Respiratory:  Negative for cough and shortness of breath.    Cardiovascular:  Negative for chest pain, palpitations and leg swelling.   Neurological:  Positive for light-headedness  "(rare brief). Negative for dizziness and headaches.     Objective   /80   Pulse 58   Ht 5' 9\" (1.753 m)   Wt 78.5 kg (173 lb)   SpO2 95%   BMI 25.55 kg/m²      Physical Exam  Vitals reviewed.   Constitutional:       General: He is not in acute distress.     Appearance: Normal appearance. He is normal weight. He is not ill-appearing.   HENT:      Head: Normocephalic and atraumatic.   Eyes:      General: No scleral icterus.        Right eye: No discharge.         Left eye: No discharge.   Cardiovascular:      Rate and Rhythm: Normal rate and regular rhythm.      Pulses: Normal pulses. no weak pulses.      Heart sounds: Normal heart sounds. No murmur heard.  Pulmonary:      Effort: Pulmonary effort is normal. No respiratory distress.      Breath sounds: Normal breath sounds. No wheezing.   Musculoskeletal:      Cervical back: Normal range of motion and neck supple.      Right lower leg: No edema.      Left lower leg: No edema.   Neurological:      Mental Status: He is alert and oriented to person, place, and time.      Gait: Gait normal.   Psychiatric:         Mood and Affect: Mood normal.         Behavior: Behavior normal.         Thought Content: Thought content normal.         Judgment: Judgment normal.         Diabetic Foot Exam    Patient's shoes and socks removed.    Right Foot/Ankle   Right Foot Inspection  Skin Exam: skin normal and skin intact. No abnormal color and no pre-ulcer.     Toe Exam: ROM and strength within normal limits.     Sensory   Proprioception: intact  Monofilament testing: intact    Vascular  Capillary refills: < 3 seconds      Left Foot/Ankle  Left Foot Inspection  Skin Exam: skin normal and skin intact. Normal color and no pre-ulcer.     Toe Exam: ROM and strength within normal limits.     Sensory   Proprioception: intact  Monofilament testing: intact    Vascular  Capillary refills: < 3 seconds      Assign Risk Category  No deformity present  No loss of protective sensation  No " weak pulses  Risk: 0

## 2025-05-05 NOTE — ASSESSMENT & PLAN NOTE
Lab Results   Component Value Date    HGBA1C 7.5 (A) 05/05/2025   7.7 in Nov, better this time. No recent med changes   Amaryl 1 tab in morning & night.   Metformin 1 tab in am & pm for several months now. A1c reflects that.   Rare episodes of feeling shaky, not sure if sugar related.   F/W endocrine call to make new appt. FASTING LABS ordered for prior to endocrine appt   Using deniz sensor.   Orders:  •  POCT hemoglobin A1c  •  Hemoglobin A1C; Future  •  Comprehensive metabolic panel; Future  •  Albumin / creatinine urine ratio; Future

## 2025-05-05 NOTE — ASSESSMENT & PLAN NOTE
Lab Results   Component Value Date    EGFR 56 (L) 11/22/2024    EGFR 49 (L) 08/12/2024    EGFR 54 (L) 02/22/2024    CREATININE 1.28 (H) 11/22/2024    CREATININE 1.43 (H) 08/12/2024    CREATININE 1.31 (H) 02/22/2024   Stable & appropriate for age.   Could increase water.   Not using NSAID's, just tylenol.   Orders:  •  CBC and differential; Future

## 2025-05-05 NOTE — ASSESSMENT & PLAN NOTE
Dose reordered at lower pill.   Orders:  •  metoprolol succinate (TOPROL-XL) 25 mg 24 hr tablet; Take 1 tablet (25 mg total) by mouth daily - NO LONGER CUTTING IN HALF

## 2025-05-08 DIAGNOSIS — E11.9 TYPE 2 DIABETES MELLITUS WITHOUT COMPLICATION, WITHOUT LONG-TERM CURRENT USE OF INSULIN (HCC): ICD-10-CM

## 2025-05-21 ENCOUNTER — REMOTE DEVICE CLINIC VISIT (OUTPATIENT)
Dept: CARDIOLOGY CLINIC | Facility: CLINIC | Age: 85
End: 2025-05-21
Payer: COMMERCIAL

## 2025-05-21 ENCOUNTER — RESULTS FOLLOW-UP (OUTPATIENT)
Dept: NON INVASIVE DIAGNOSTICS | Facility: HOSPITAL | Age: 85
End: 2025-05-21

## 2025-05-21 DIAGNOSIS — Z95.0 HX OF CARDIAC PACEMAKER: Primary | ICD-10-CM

## 2025-05-21 PROCEDURE — 93296 REM INTERROG EVL PM/IDS: CPT | Performed by: STUDENT IN AN ORGANIZED HEALTH CARE EDUCATION/TRAINING PROGRAM

## 2025-05-21 PROCEDURE — 93294 REM INTERROG EVL PM/LDLS PM: CPT | Performed by: STUDENT IN AN ORGANIZED HEALTH CARE EDUCATION/TRAINING PROGRAM

## 2025-05-21 NOTE — PROGRESS NOTES
MDT DC PM - ACTIVE SYSTEM IS MRI CONDITIONAL   CARELINK TRANSMISSION:  BATTERY VOLTAGE ADEQUATE (10.8 YR.).  AP  39.6%  100% (CHB-DEPENDENT/DDD 60 PPM, -180 MS).  ALL AVAILABLE LEAD PARAMETERS WITHIN NORMAL LIMITS.  NO SIGNIFICANT HIGH RATE EPISODES. PVC 0.3/HR.  NORMAL DEVICE FUNCTION.   ES

## 2025-06-10 DIAGNOSIS — R03.0 ELEVATED BP WITHOUT DIAGNOSIS OF HYPERTENSION: ICD-10-CM

## 2025-06-10 DIAGNOSIS — I10 PRIMARY HYPERTENSION: ICD-10-CM

## 2025-06-10 DIAGNOSIS — E11.9 TYPE 2 DIABETES MELLITUS WITHOUT COMPLICATION, WITHOUT LONG-TERM CURRENT USE OF INSULIN (HCC): ICD-10-CM

## 2025-06-10 RX ORDER — LISINOPRIL 10 MG/1
10 TABLET ORAL DAILY
Qty: 90 TABLET | Refills: 0 | Status: CANCELLED | OUTPATIENT
Start: 2025-06-10

## 2025-06-11 RX ORDER — AMLODIPINE BESYLATE 5 MG/1
5 TABLET ORAL DAILY
Qty: 90 TABLET | Refills: 0 | Status: SHIPPED | OUTPATIENT
Start: 2025-06-11

## 2025-06-19 DIAGNOSIS — E11.9 TYPE 2 DIABETES MELLITUS WITHOUT COMPLICATION, WITHOUT LONG-TERM CURRENT USE OF INSULIN (HCC): ICD-10-CM

## 2025-06-19 RX ORDER — GLIMEPIRIDE 4 MG/1
TABLET ORAL
Qty: 180 TABLET | Refills: 1 | Status: SHIPPED | OUTPATIENT
Start: 2025-06-19

## 2025-07-07 DIAGNOSIS — N18.30 STAGE 3 CHRONIC KIDNEY DISEASE, UNSPECIFIED WHETHER STAGE 3A OR 3B CKD (HCC): ICD-10-CM

## 2025-07-07 DIAGNOSIS — E11.22 TYPE 2 DIABETES MELLITUS WITH STAGE 3A CHRONIC KIDNEY DISEASE, WITHOUT LONG-TERM CURRENT USE OF INSULIN (HCC): ICD-10-CM

## 2025-07-07 DIAGNOSIS — N18.31 TYPE 2 DIABETES MELLITUS WITH STAGE 3A CHRONIC KIDNEY DISEASE, WITHOUT LONG-TERM CURRENT USE OF INSULIN (HCC): ICD-10-CM

## 2025-07-07 DIAGNOSIS — E11.9 TYPE 2 DIABETES MELLITUS WITHOUT COMPLICATION, WITHOUT LONG-TERM CURRENT USE OF INSULIN (HCC): ICD-10-CM

## 2025-07-08 RX ORDER — METFORMIN HYDROCHLORIDE 500 MG/1
TABLET, EXTENDED RELEASE ORAL
Qty: 270 TABLET | Refills: 1 | Status: SHIPPED | OUTPATIENT
Start: 2025-07-08 | End: 2025-07-10 | Stop reason: SDUPTHER

## 2025-07-10 ENCOUNTER — TELEPHONE (OUTPATIENT)
Dept: ENDOCRINOLOGY | Facility: HOSPITAL | Age: 85
End: 2025-07-10

## 2025-07-10 ENCOUNTER — OFFICE VISIT (OUTPATIENT)
Dept: ENDOCRINOLOGY | Facility: HOSPITAL | Age: 85
End: 2025-07-10
Payer: COMMERCIAL

## 2025-07-10 VITALS
DIASTOLIC BLOOD PRESSURE: 76 MMHG | HEART RATE: 72 BPM | SYSTOLIC BLOOD PRESSURE: 130 MMHG | BODY MASS INDEX: 25.89 KG/M2 | OXYGEN SATURATION: 97 % | WEIGHT: 174.8 LBS | HEIGHT: 69 IN

## 2025-07-10 DIAGNOSIS — N18.30 STAGE 3 CHRONIC KIDNEY DISEASE, UNSPECIFIED WHETHER STAGE 3A OR 3B CKD (HCC): ICD-10-CM

## 2025-07-10 DIAGNOSIS — E78.2 MIXED HYPERLIPIDEMIA: ICD-10-CM

## 2025-07-10 DIAGNOSIS — E11.9 TYPE 2 DIABETES MELLITUS WITHOUT COMPLICATION, WITHOUT LONG-TERM CURRENT USE OF INSULIN (HCC): Primary | ICD-10-CM

## 2025-07-10 DIAGNOSIS — E11.22 TYPE 2 DIABETES MELLITUS WITH STAGE 3A CHRONIC KIDNEY DISEASE, WITHOUT LONG-TERM CURRENT USE OF INSULIN (HCC): ICD-10-CM

## 2025-07-10 DIAGNOSIS — N18.31 TYPE 2 DIABETES MELLITUS WITH STAGE 3A CHRONIC KIDNEY DISEASE, WITHOUT LONG-TERM CURRENT USE OF INSULIN (HCC): ICD-10-CM

## 2025-07-10 LAB
ALBUMIN SERPL-MCNC: 4.2 G/DL (ref 3.7–4.7)
ALBUMIN/CREAT UR: 3 MG/G CREAT (ref 0–29)
ALP SERPL-CCNC: 92 IU/L (ref 44–121)
ALT SERPL-CCNC: 15 IU/L (ref 0–44)
AST SERPL-CCNC: 17 IU/L (ref 0–40)
BASOPHILS # BLD AUTO: 0.1 X10E3/UL (ref 0–0.2)
BASOPHILS NFR BLD AUTO: 1 %
BILIRUB SERPL-MCNC: 0.5 MG/DL (ref 0–1.2)
BUN SERPL-MCNC: 18 MG/DL (ref 8–27)
BUN/CREAT SERPL: 15 (ref 10–24)
CALCIUM SERPL-MCNC: 9.6 MG/DL (ref 8.6–10.2)
CHLORIDE SERPL-SCNC: 101 MMOL/L (ref 96–106)
CHOLEST SERPL-MCNC: 139 MG/DL (ref 100–199)
CHOLEST/HDLC SERPL: 3.4 RATIO (ref 0–5)
CO2 SERPL-SCNC: 20 MMOL/L (ref 20–29)
CREAT SERPL-MCNC: 1.2 MG/DL (ref 0.76–1.27)
CREAT UR-MCNC: 95.4 MG/DL
EGFR: 60 ML/MIN/1.73
EOSINOPHIL # BLD AUTO: 0.2 X10E3/UL (ref 0–0.4)
EOSINOPHIL NFR BLD AUTO: 2 %
ERYTHROCYTE [DISTWIDTH] IN BLOOD BY AUTOMATED COUNT: 11.4 % (ref 11.6–15.4)
EST. AVERAGE GLUCOSE BLD GHB EST-MCNC: 189 MG/DL
GLOBULIN SER-MCNC: 3 G/DL (ref 1.5–4.5)
GLUCOSE SERPL-MCNC: 203 MG/DL (ref 70–99)
HBA1C MFR BLD: 8.2 % (ref 4.8–5.6)
HCT VFR BLD AUTO: 42.5 % (ref 37.5–51)
HDLC SERPL-MCNC: 41 MG/DL
HGB BLD-MCNC: 13.7 G/DL (ref 13–17.7)
IMM GRANULOCYTES # BLD: 0 X10E3/UL (ref 0–0.1)
IMM GRANULOCYTES NFR BLD: 0 %
LDLC SERPL CALC-MCNC: 76 MG/DL (ref 0–99)
LYMPHOCYTES # BLD AUTO: 2.7 X10E3/UL (ref 0.7–3.1)
LYMPHOCYTES NFR BLD AUTO: 27 %
MCH RBC QN AUTO: 32.5 PG (ref 26.6–33)
MCHC RBC AUTO-ENTMCNC: 32.2 G/DL (ref 31.5–35.7)
MCV RBC AUTO: 101 FL (ref 79–97)
MICROALBUMIN UR-MCNC: 3.3 UG/ML
MONOCYTES # BLD AUTO: 0.9 X10E3/UL (ref 0.1–0.9)
MONOCYTES NFR BLD AUTO: 9 %
NEUTROPHILS # BLD AUTO: 6.1 X10E3/UL (ref 1.4–7)
NEUTROPHILS NFR BLD AUTO: 61 %
PLATELET # BLD AUTO: 276 X10E3/UL (ref 150–450)
POTASSIUM SERPL-SCNC: 4.7 MMOL/L (ref 3.5–5.2)
PROT SERPL-MCNC: 7.2 G/DL (ref 6–8.5)
RBC # BLD AUTO: 4.21 X10E6/UL (ref 4.14–5.8)
SL AMB VLDL CHOLESTEROL CALC: 22 MG/DL (ref 5–40)
SODIUM SERPL-SCNC: 136 MMOL/L (ref 134–144)
TRIGL SERPL-MCNC: 120 MG/DL (ref 0–149)
WBC # BLD AUTO: 9.9 X10E3/UL (ref 3.4–10.8)

## 2025-07-10 PROCEDURE — 95251 CONT GLUC MNTR ANALYSIS I&R: CPT | Performed by: STUDENT IN AN ORGANIZED HEALTH CARE EDUCATION/TRAINING PROGRAM

## 2025-07-10 PROCEDURE — 99214 OFFICE O/P EST MOD 30 MIN: CPT | Performed by: STUDENT IN AN ORGANIZED HEALTH CARE EDUCATION/TRAINING PROGRAM

## 2025-07-10 RX ORDER — GLIMEPIRIDE 4 MG/1
TABLET ORAL
Qty: 180 TABLET | Refills: 1 | Status: SHIPPED | OUTPATIENT
Start: 2025-07-10

## 2025-07-10 RX ORDER — METFORMIN HYDROCHLORIDE 500 MG/1
1000 TABLET, EXTENDED RELEASE ORAL 2 TIMES DAILY WITH MEALS
Qty: 270 TABLET | Refills: 1 | Status: SHIPPED | OUTPATIENT
Start: 2025-07-10

## 2025-07-10 NOTE — PATIENT INSTRUCTIONS
- Inc metformin 1000mg BID  - Glimepride 4mg in AM and 4mg in PM  - ROXANE and C peptide labs NOW  - repeat A1C, cholestrol and urine in 3 months   Follow up in 3 months

## 2025-07-10 NOTE — PROGRESS NOTES
Name: Pipo Morris      : 1940      MRN: [id number]  Encounter Provider: Arabella Bedoya MD  Encounter Date: 7/10/2025   Encounter department: [facility] [place]    No chief complaint on file.  :  Assessment & Plan    Patient is a 83yM With PMHx of DM since 15 years ago Without any complications  Who presents today for diabetic care.      1) T2DM:- Patients last A1C is much higher than before and now at 8.2% with CGM showing limited data but what's available is high. Currently on metformin and Glimepride which we can try to optimize him more on metformin but also suggest starting either januvia or jardiance, whatever is covered to improve his PP BG better. Discussed to use his CGM more often and repeat labs in 3 months     Plan   - Inc metformin 1000mg BID  - Glimepride 4mg in AM and 4mg in PM    Screening:-   Retinopathy- UTD  Nephropathy- UTD  Lipide levels- last labs , normal, order for next visit     2) Hyperlipidemia:- c/w zocor 20mg daily      RTC in 3 months     Discussed with the patient and all questioned fully answered. He will call me if any problems arise.    Pertinent Medical History     History of Present Illness     Pipo Morris is a 84 y.o. male history of type 2 diabetes Without any complications with other Pmhx of hyperlipidemia and hypertension who presents today for diabetes management.      Patient was first diagnosed with T2DM - 15 years ago  Control since diagnosis: suboptimal   Medications tried thus far:below  Current regime:- metformin XR 500mg BID (wasn't taking higher dose as suggested), glimepride 4mg AM/ 2mg PM (didn't increase dose last time)m  BG control:- Pipo Morris   Device used Dinorah      Indication   Type 2 Diabetes     More than 72 hours of data was reviewed. Report to be scanned to chart.      Date Range: - July 10 2025   Cgm use only 39%   Analysis of data:   Average Glucose: 243  Time in Target Range 16%  Time  "Above Range:    35%/45%  Time Below Range:     Interpretation of data:  very spotty BG checks and what's available are high      Hypoglycemic episodes: None  Hyperglycemia symptoms: no polyuria or polydipsia\",\"no chest pain, dyspnea or TIAs\",\"no numbness, tingling or pain in extremities\"  Diet: bk egg/turkey holt, lunch sandwitch with ham or turkey, fruits- sometimes skips, dinner is chicken/beef  Activity: walks daily couple of miles      Opthamology:had exam last month ; no retinopathy, no macular edema  Foot exam- 01/24  Hx of hyperlipidemia: yes  Hx of hypertension:no  Last Lipid:- 07/25 normal   Last urine microalbumin: 07/25 normal  Last hbA1C:7/25 8.2%, 7/24 7.2%, 02/24 6.8%, 12/23 7.9%     Social Hx/family hx- reviewed and as below in chart    Review of Systems   Constitutional:  Negative for diaphoresis, fatigue and unexpected weight change.   Respiratory:  Negative for shortness of breath.    Cardiovascular:  Negative for chest pain and palpitations.   Gastrointestinal:  Negative for constipation and diarrhea.   Endocrine: Negative for polydipsia and polyuria.    as per HPI       Medical History Reviewed by provider this encounter:     .    Objective   There were no vitals taken for this visit.     There is no height or weight on file to calculate BMI.  Wt Readings from Last 3 Encounters:   05/05/25 78.5 kg (173 lb)   03/27/25 78.8 kg (173 lb 12.8 oz)   11/04/24 78.2 kg (172 lb 6.4 oz)     Physical Exam  Vitals reviewed.   Constitutional:       General: He is not in acute distress.     Appearance: Normal appearance. He is well-developed and normal weight.   HENT:      Head: Normocephalic and atraumatic.     Eyes:      Conjunctiva/sclera: Conjunctivae normal.       Cardiovascular:      Rate and Rhythm: Normal rate and regular rhythm.   Pulmonary:      Effort: Pulmonary effort is normal.      Breath sounds: Normal breath sounds.   Abdominal:      Palpations: Abdomen is soft.     Musculoskeletal:      " Cervical back: Neck supple.     Skin:     General: Skin is warm and dry.      Capillary Refill: Capillary refill takes less than 2 seconds.     Neurological:      Mental Status: He is alert.     Psychiatric:         Mood and Affect: Mood normal.      Latest Reference Range & Units 12/13/23 14:51 02/22/24 10:14 08/12/24 09:07 11/22/24 10:29 05/05/25 10:45 07/08/25 10:12   Hemoglobin A1C 4.8 - 5.6 % 7.9 ! 6.8 (H) 7.2 (H) 7.7 (H) 7.5 ! 8.2 (H)   eAG, EST AVG Glucose mg/dL  148 160 174  189   !: Data is abnormal  (H): Data is abnormally high    Discussed with the patient and all questioned fully answered. He will call me if any problems arise.

## 2025-07-10 NOTE — TELEPHONE ENCOUNTER
Pt left his Freestyle Dinorah 2 monitor in the exam room when he left his appointment with Dr Bedoya today in .    LMOM on patient's home phone (preferred) and did not get an answer on his cell phone number.    Monitor will be at reception desk - [address] at [address] [place] [address] in [place]

## 2025-08-15 DIAGNOSIS — E11.9 TYPE 2 DIABETES MELLITUS WITHOUT COMPLICATION, WITHOUT LONG-TERM CURRENT USE OF INSULIN (HCC): ICD-10-CM

## 2025-08-18 ENCOUNTER — REMOTE DEVICE CLINIC VISIT (OUTPATIENT)
Dept: CARDIOLOGY CLINIC | Facility: CLINIC | Age: 85
End: 2025-08-18
Payer: COMMERCIAL

## 2025-08-18 DIAGNOSIS — Z95.0 PRESENCE OF CARDIAC PACEMAKER: ICD-10-CM

## 2025-08-18 DIAGNOSIS — E11.9 TYPE 2 DIABETES MELLITUS WITHOUT COMPLICATION, WITHOUT LONG-TERM CURRENT USE OF INSULIN (HCC): Primary | ICD-10-CM

## 2025-08-18 DIAGNOSIS — I47.10 PSVT (PAROXYSMAL SUPRAVENTRICULAR TACHYCARDIA) (HCC): Primary | ICD-10-CM

## 2025-08-18 DIAGNOSIS — I44.2 COMPLETE HEART BLOCK (HCC): ICD-10-CM

## 2025-08-18 DIAGNOSIS — E11.9 TYPE 2 DIABETES MELLITUS WITHOUT COMPLICATION, WITHOUT LONG-TERM CURRENT USE OF INSULIN (HCC): ICD-10-CM

## 2025-08-18 PROCEDURE — 93294 REM INTERROG EVL PM/LDLS PM: CPT | Performed by: STUDENT IN AN ORGANIZED HEALTH CARE EDUCATION/TRAINING PROGRAM

## 2025-08-18 PROCEDURE — 93280 PM DEVICE PROGR EVAL DUAL: CPT | Performed by: STUDENT IN AN ORGANIZED HEALTH CARE EDUCATION/TRAINING PROGRAM

## 2025-08-19 RX ORDER — BLOOD-GLUCOSE SENSOR
EACH MISCELLANEOUS
Qty: 2 EACH | Refills: 5 | Status: SHIPPED | OUTPATIENT
Start: 2025-08-19 | End: 2025-08-25 | Stop reason: SDUPTHER

## 2025-08-19 RX ORDER — BLOOD-GLUCOSE SENSOR
1 EACH MISCELLANEOUS
Qty: 6 EACH | Refills: 3 | Status: SHIPPED | OUTPATIENT
Start: 2025-08-19

## 2025-08-25 RX ORDER — BLOOD-GLUCOSE SENSOR
EACH MISCELLANEOUS
Qty: 2 EACH | Refills: 0 | Status: SHIPPED | OUTPATIENT
Start: 2025-08-25

## (undated) DEVICE — SLITTER ADJUSTABLE

## (undated) DEVICE — DGW .035 FC J3MM 150CM TEF: Brand: EMERALD

## (undated) DEVICE — CATH GUIDING FIXED SHAPE 43CM

## (undated) DEVICE — INTRO SHEATH PEEL AWAY 7FR